# Patient Record
Sex: FEMALE | Race: ASIAN | NOT HISPANIC OR LATINO | URBAN - METROPOLITAN AREA
[De-identification: names, ages, dates, MRNs, and addresses within clinical notes are randomized per-mention and may not be internally consistent; named-entity substitution may affect disease eponyms.]

---

## 2017-04-17 ENCOUNTER — OFFICE VISIT (OUTPATIENT)
Dept: FAMILY MEDICINE | Facility: CLINIC | Age: 27
End: 2017-04-17

## 2017-04-17 VITALS
DIASTOLIC BLOOD PRESSURE: 64 MMHG | SYSTOLIC BLOOD PRESSURE: 97 MMHG | WEIGHT: 112.2 LBS | TEMPERATURE: 98.2 F | HEART RATE: 78 BPM

## 2017-04-17 DIAGNOSIS — Z30.09 ENCOUNTER FOR OTHER GENERAL COUNSELING OR ADVICE ON CONTRACEPTION: ICD-10-CM

## 2017-04-17 DIAGNOSIS — G40.909 NONINTRACTABLE EPILEPSY WITHOUT STATUS EPILEPTICUS, UNSPECIFIED EPILEPSY TYPE (H): Primary | ICD-10-CM

## 2017-04-17 LAB
ALBUMIN SERPL BCP-MCNC: 4.2 G/DL (ref 3.5–5)
ALP SERPL-CCNC: 69 U/L (ref 45–120)
ALT SERPL W/O P-5'-P-CCNC: 11 U/L (ref 0–45)
ANION GAP SERPL CALCULATED.3IONS-SCNC: 6 MMOL/L (ref 5–18)
AST SERPL-CCNC: 15 U/L (ref 0–40)
BILIRUB SERPL-MCNC: 0.8 MG/DL (ref 0–1)
BUN SERPL-MCNC: 12 MG/DL (ref 8–22)
CALCIUM SERPL-MCNC: 9.2 MG/DL (ref 8.5–10.5)
CHLORIDE SERPL-SCNC: 108 MMOL/L (ref 98–107)
CO2 SERPL-SCNC: 26 MMOL/L (ref 22–31)
CREAT SERPL-MCNC: 0.68 MG/DL (ref 0.6–1.1)
GLUCOSE SERPL-MCNC: 89 MG/DL (ref 70–125)
HCG UR QL: NEGATIVE
HCT VFR BLD AUTO: 34.8 % (ref 35–47)
HEMOGLOBIN: 10.6 G/DL (ref 11.7–15.7)
MAGNESIUM SERPL-MCNC: 2.3 MG/DL (ref 1.8–2.6)
MCH RBC QN AUTO: 19 PG (ref 26.5–35)
MCHC RBC AUTO-ENTMCNC: 30.5 G/DL (ref 32–36)
MCV RBC AUTO: 62.3 FL (ref 78–100)
PLATELET # BLD AUTO: 303 K/UL (ref 150–450)
POTASSIUM SERPL-SCNC: 4.1 MMOL/L (ref 3.5–5)
PROT SERPL-MCNC: 7.7 G/DL (ref 6–8)
RBC # BLD AUTO: 5.6 M/UL (ref 3.8–5.2)
SODIUM SERPL-SCNC: 140 MMOL/L (ref 136–145)
TSH SERPL DL<=0.05 MIU/L-ACNC: 1.8 UIU/ML (ref 0.3–5)
WBC # BLD AUTO: 9.3 K/UL (ref 4–11)

## 2017-04-17 NOTE — MR AVS SNAPSHOT
After Visit Summary   4/17/2017    Mac Austin    MRN: [id number]           Patient Information     Date Of Birth          1990        Visit Information        Provider Department      4/17/2017 2:30 PM Elvin Brizuela DO [facility]        Today's Diagnoses     Nonintractable epilepsy without status epilepticus, unspecified epilepsy type (H)    -  1    Encounter for other general counseling or advice on contraception           Follow-ups after your visit        Additional Services     NEUROLOGY ADULT REFERRAL       Patient to stop at the [facility] Desk    Reason for Referral: 5 years history of generalized siezures. Occurs 3-4x a month. Only occurs at night  [facility]: [facility] ([facility]): [phone]     needed: Yes  Language: Jennifer    May leave message on voicemail: No                  Future tests that were ordered for you today     Open Future Orders        Priority Expected Expires Ordered    MRI BRAIN W CONTRAST Routine  4/17/2018 4/17/2017    AMBULATORY EEG MONITORING Routine  5/15/2017 4/17/2017    NEUROLOGY ADULT REFERRAL Routine  9/17/2017 4/17/2017            Who to contact     Please call your clinic at [phone] to:    Ask questions about your health    Make or cancel appointments    Discuss your medicines    Learn about your test results    Speak to your doctor   If you have compliments or concerns about an experience at your clinic, or if you wish to file a complaint, please contact [facility] Patient Relations at [phone] or email us at [email or web address]         Additional Information About Your Visit        Art of Clickhart Information     [employer] is an electronic gateway that provides easy, online access to your medical records. With [employer], you can request a clinic appointment, read your test results, renew a prescription or communicate with your care team.     To sign up for [employer] visit the  website at [email or web address]   You will be asked to enter the access code listed below, as well as some personal information. Please follow the directions to create your username and password.     Your access code is: [email or web address]  Expires: 2017  3:32 PM     Your access code will  in 90 days. If you need help or a new code, please contact your [facility] or call [phone] for assistance.        Care EveryWhere ID     This is your Care EveryWhere ID. This could be used by other organizations to access your [place] medical records  [id number]        Your Vitals Were     Pulse Temperature Last Period Breastfeeding?          78 98.2  F (36.8  C) (Oral) 2017 (Approximate) No         Blood Pressure from Last 3 Encounters:   17 97/64    Weight from Last 3 Encounters:   17 112 lb 3.2 oz (50.9 kg)              We Performed the Following     CBC with Plt ([facility])     Comprehensive Metabolic ([facility]) - Results > 1 hr     HCG Qualitative Urine (UPT)  ([facility])     Magnesium ([facility])     TSH  Sensitive ([facility])        Primary Care Provider Office Phone # Fax #    Buck Olivo [phone]       [facility] [address] [place] [address]        Thank you!     Thank you for choosing [facility]  for your care. Our goal is always to provide you with excellent care. Hearing back from our patients is one way we can continue to improve our services. Please take a few minutes to complete the written survey that you may receive in the mail after your visit with us. Thank you!             Your Updated Medication List - Protect others around you: Learn how to safely use, store and throw away your medicines at www.disposemymeds.org.      Notice  As of 2017  8:39 PM    You have not been prescribed any medications.

## 2017-04-17 NOTE — PROGRESS NOTES
"  Family History   Problem Relation Age of Onset     DIABETES No family hx of      Coronary Artery Disease No family hx of      Other Cancer No family hx of      Social History     Social History     Marital status:      Spouse name: N/A     Number of children: N/A     Years of education: N/A     Social History Main Topics     Smoking status: Never Smoker     Smokeless tobacco: None     Alcohol use No     Drug use: No     Sexual activity: Not Asked     Other Topics Concern     None     Social History Narrative     None       Nursing Notes:   Chante Nobles  4/17/2017  2:45 PM  Signed   name: Anastacio Hemphill (Htoo)  Language: Jennifer  Agency: [employer]  Phone number: [phone]  Chief Complaint   Patient presents with     Seizures     at night time she is having seizures, it does not happen during the day just at night, she has muscle tightness, screams, and also she bites her tounge and lip, it happens 3-4 times a month been going on for 5.5 years, after the seizures she gets headache right afterwards      Blood pressure 97/64, pulse 78, temperature 98.2  F (36.8  C), temperature source Oral, weight 112 lb 3.2 oz (50.9 kg), last menstrual period 03/31/2017, not currently breastfeeding.    S:  Seizures Started about 5-6 years ago, occurs about 3-4x/month. No Inciting event, no trauma, no adverse event. Patient's  describes seizure activity as tight neck, muscles and tight arms and legs and sometimes foam in mouth. After event, pt reports that she does not remember event, has a headache, and \"fogginess\". In the past, was more prone to get seizure during menses but currently not know what triggers event.    Patient denies past history of trauma to head. Admits to psychological stress of running away during civil war. Does not use alcohol, smoke, or recreational drugs. Does not use any medications. Past medical history includes occasional UTI. No current UTI    Patient reports that she [place]  [place] on [place] for the seizure and was prescribed \"predno\". Reports relief of symptoms for 1 month while on medication. Stopped taking because the were worried that she might become pregnant as they were not using birth control at that time    At this time, they are not currently interested in getting pregnant and would like to manage the seizure disorder first.     FH:  No family history of seizures, no heart disease    SH: not contributory    O:  BP 97/64 (BP Location: Left arm, Patient Position: Chair)  Pulse 78  Temp 98.2  F (36.8  C) (Oral)  Wt 112 lb 3.2 oz (50.9 kg)  LMP 03/31/2017 (Approximate)  Breastfeeding? No  General: On Chair, no acute distress  HEENT: No conjunctivitis, EOM grossly intact, oral mucosa pink and moist, no cervical adenopathy palpated  Heart: RRR, no murmurs, rubs, or clicks  Lungs: CTA all fields, no wheeze, no crackles, no respiratory distress  Abd: Soft, non tender, not distended, no guarding, no rebound, normoactive bowel sounds,   Extremites: No edema, no lesions noted, pulses present bilaterally and equal, Skin: No lesions, no edema, excorations, or rashes noted  Neuro: no focal neuro deficit,sensation intact upper and lower extremites, strength 5/5 upper and lower extremites      A/P:  1. Nonintractable epilepsy without status epilepticus, unspecified epilepsy type (H)  Patient likely has generalized seizure disorder from symptoms. Noted no family history. Will get labs to see if electrolyte imbalance can be causing symptoms. MRI obtained to r/o organic cause of seizure. EEG and neuro referral for help in management. Discussed with patient to start on contraceptive as will likely begin anticonvulsant at next visit. Will get bHG to ensure that not pregnant at this time.   - CBC with Plt ([facility])  - Comprehensive Metabolic ([facility]) - Results > 1 hr  - Magnesium ([facility])  - TSH  Sensitive ([facility])  - HCG Qualitative Urine (UPT)  ([facility])  - NEUROLOGY " ADULT REFERRAL; Future  - AMBULATORY EEG MONITORING; Future  - MRI BRAIN W CONTRAST; Future  - F/u in 2-3 weeks after MRI and EEG and start on loading dose Keppra at that time  - Call patient to advise not to drive at this time    2. Encounter for other general counseling or advice on contraception  Discussed options for contraception as will likely need birth control while on anticonvlsent. Options include OCP, depot, patch, IUD, and nexplanon. Patient reports that she will think about it at this time. Does not desire to get pregnant next 1 year but wants kids eventually  - HCG Qualitative Urine (UPT)  ([facility])  - f/u at next visit when starting anticonvulsant  - gave condoms to use.    Elvin Brizuela  Family Medicine Resident PGY2    Discussed with Dr. Lawson

## 2017-04-17 NOTE — NURSING NOTE
name: Anastacio (Mary Jo) Joby  Language: Jennifer  Agency: [employer]  Phone number: [phone]

## 2017-04-18 NOTE — PATIENT INSTRUCTIONS
Your referral has been scheduled:     [facility]   [address] [place] [address]  [phone]  MRI   Date: Monday April 24 2017   Time: 9:15 AM  Rescheduled to Friday April 28, 2017  Time:  5:15 PM  Jennifer  requested.  Annette Velez  4/24/17    [facility]  [address], [address],   [place] [address] ([facility], [facility] [address])  [id number]  Date: Wednesday May 24 2017   Time: 9:30 AM Dr. Herman and EEG     If you have any questions or need to reschedule please call the number listed above.  Any other concerns please call our referral coordinator at [phone]    Le  Care Coordinator     GAVE TO JOE

## 2017-05-01 NOTE — PROGRESS NOTES
Preceptor attestation:  Patient seen and discussed with the resident. Assessment and plan reviewed with resident and agreed upon.  Supervising physician: Paco Lawson  [facility]

## 2017-05-02 DIAGNOSIS — G40.909 NONINTRACTABLE EPILEPSY WITHOUT STATUS EPILEPTICUS, UNSPECIFIED EPILEPSY TYPE (H): ICD-10-CM

## 2017-05-10 ENCOUNTER — OFFICE VISIT (OUTPATIENT)
Dept: FAMILY MEDICINE | Facility: CLINIC | Age: 27
End: 2017-05-10

## 2017-05-10 VITALS
OXYGEN SATURATION: 98 % | SYSTOLIC BLOOD PRESSURE: 105 MMHG | TEMPERATURE: 98.4 F | BODY MASS INDEX: 20.69 KG/M2 | HEART RATE: 83 BPM | HEIGHT: 61 IN | WEIGHT: 109.6 LBS | DIASTOLIC BLOOD PRESSURE: 72 MMHG

## 2017-05-10 DIAGNOSIS — G44.219 EPISODIC TENSION-TYPE HEADACHE, NOT INTRACTABLE: ICD-10-CM

## 2017-05-10 DIAGNOSIS — D64.9 ANEMIA, UNSPECIFIED TYPE: ICD-10-CM

## 2017-05-10 DIAGNOSIS — R56.9 CONVULSIONS, UNSPECIFIED CONVULSION TYPE (H): ICD-10-CM

## 2017-05-10 DIAGNOSIS — R10.11 ABDOMINAL PAIN, RIGHT UPPER QUADRANT: Primary | ICD-10-CM

## 2017-05-10 LAB
ALBUMIN SERPL-MCNC: 4.5 MG/DL (ref 3.9–5.1)
ALP SERPL-CCNC: 66.5 U/L (ref 40–150)
ALT SERPL-CCNC: <15 U/L (ref 0–45)
AST SERPL-CCNC: 17.3 U/L (ref 0–45)
BILIRUB SERPL-MCNC: 0.8 MG/DL (ref 0.2–1.3)
BILIRUBIN DIRECT: 0.3 MG/DL (ref 0–0.2)
BUN SERPL-MCNC: 8.8 MG/DL (ref 7–19)
CALCIUM SERPL-MCNC: 9.7 MG/DL (ref 8.5–10.1)
CHLORIDE SERPLBLD-SCNC: 99.6 MMOL/L (ref 98–110)
CO2 SERPL-SCNC: 26.2 MMOL/L (ref 20–32)
CREAT SERPL-MCNC: 0.6 MG/DL (ref 0.5–1)
FERRITIN SERPL-MCNC: 96 NG/ML (ref 10–130)
GFR SERPL CREATININE-BSD FRML MDRD: >90 ML/MIN/1.7 M2
GLUCOSE SERPL-MCNC: 97.7 MG'DL (ref 70–99)
IRON SATN MFR SERPL: 13 % (ref 20–50)
IRON SERPL-MCNC: 31 UG/DL (ref 42–175)
LIPASE SERPL-CCNC: 7 U/L (ref 0–52)
POTASSIUM SERPL-SCNC: 3.7 MMOL/DL (ref 3.2–4.6)
PROT SERPL-MCNC: 8 G/DL (ref 6.8–8.8)
SODIUM SERPL-SCNC: 137 MMOL/L (ref 132–142)
TIBC SERPL-MCNC: 241 UG/DL (ref 313–563)
TRANSFERRIN SERPL-MCNC: 193 MG/DL (ref 212–360)

## 2017-05-10 RX ORDER — IBUPROFEN 600 MG/1
600 TABLET, FILM COATED ORAL EVERY 6 HOURS PRN
Qty: 90 TABLET | Refills: 1 | Status: SHIPPED | OUTPATIENT
Start: 2017-05-10 | End: 2017-10-03

## 2017-05-10 NOTE — PROGRESS NOTES
Preceptor attestation:  Patient seen and discussed with the resident. Assessment and plan reviewed with resident and agreed upon.  Supervising physician: Dieudonne Allan  [facility]

## 2017-05-10 NOTE — MR AVS SNAPSHOT
After Visit Summary   5/10/2017    Mac Austin    MRN: [id number]           Patient Information     Date Of Birth          1990        Visit Information        Provider Department      5/10/2017 2:10 PM Ariela Holman MD [facility]        Today's Diagnoses     Abdominal pain, right upper quadrant    -  1    Convulsions, unspecified convulsion type (H)        Episodic tension-type headache, not intractable        Anemia, unspecified type           Follow-ups after your visit        Future tests that were ordered for you today     Open Future Orders        Priority Expected Expires Ordered    US ABDOMEN LIMITED Routine  5/10/2018 5/10/2017            Who to contact     Please call your clinic at [phone] to:    Ask questions about your health    Make or cancel appointments    Discuss your medicines    Learn about your test results    Speak to your doctor   If you have compliments or concerns about an experience at your clinic, or if you wish to file a complaint, please contact [facility] Patient Relations at [phone] or email us at [email or web address]         Additional Information About Your Visit        MyChart Information     [employer] is an electronic gateway that provides easy, online access to your medical records. With [employer], you can request a clinic appointment, read your test results, renew a prescription or communicate with your care team.     To sign up for [employer] visit the website at [email or web address]   You will be asked to enter the access code listed below, as well as some personal information. Please follow the directions to create your username and password.     Your access code is: [email or web address]  Expires: 2017  3:32 PM     Your access code will  in 90 days. If you need help or a new code, please contact your [facility] or call [phone] for assistance.        Care EveryWhere ID      "This is your Care EveryWhere ID. This could be used by other organizations to access your [place] medical records  [id number]        Your Vitals Were     Pulse Temperature Height Last Period Pulse Oximetry BMI (Body Mass Index)    83 98.4  F (36.9  C) (Oral) 5' 1\" (154.9 cm) 04/12/2017 98% 20.71 kg/m2       Blood Pressure from Last 3 Encounters:   05/10/17 105/72   04/17/17 97/64    Weight from Last 3 Encounters:   05/10/17 109 lb 9.6 oz (49.7 kg)   04/17/17 112 lb 3.2 oz (50.9 kg)              We Performed the Following     Bilirubin  Panel ([facility])     Ferritin ([facility])     Hepatitis Acute Eval ([facility])     Hepatitis B Core Ab ([facility])     Hepatitis B Surface Ab ([facility])     Hepatitis B Surface Ag ([facility])     Hepatitis C Antibody ([facility])     Iron ([facility])     Iron Transferrin Saturat ([facility])     Lipase ([facility])     Liver Panel ([facility]) - Results < 1hr     Syphilis Screen [place] (RPR/VDRL) ([facility])     Transferrin ([facility])          Today's Medication Changes          These changes are accurate as of: 5/10/17  2:38 PM.  If you have any questions, ask your nurse or doctor.               Start taking these medicines.        Dose/Directions    ibuprofen 600 MG tablet   Commonly known as:  ADVIL/MOTRIN   Used for:  Episodic tension-type headache, not intractable   Started by:  Ariela Holman MD        Dose:  600 mg   Take 1 tablet (600 mg) by mouth every 6 hours as needed for moderate pain   Quantity:  90 tablet   Refills:  1            Where to get your medicines      [facility] [place] [place] - [address], [place] [address]     Phone:  [phone]     ibuprofen 600 MG tablet                Primary Care Provider Office Phone # Fax #    Elvni DO Gelacio [phone]       [address] [address]        Thank you!     Thank you for choosing [facility]  for your care. Our goal is always to provide you with excellent care. Hearing back from our patients is one way we can continue to improve our services. Please take a few minutes to complete the written survey that you may receive in the mail after your visit with us. Thank you!             Your Updated Medication List - Protect others around you: Learn how to safely use, store and throw away your medicines at www.disposemymeds.org.          This list is accurate as of: 5/10/17  2:38 PM.  Always use your most recent med list.                   Brand Name Dispense Instructions for use    ibuprofen 600 MG tablet    ADVIL/MOTRIN    90 tablet    Take 1 tablet (600 mg) by mouth every 6 hours as needed for moderate pain

## 2017-05-10 NOTE — PROGRESS NOTES
"       SUBJECTIVE       Mac Austin is a 27 year old  female with a PMH significant for:   There is no problem list on file for this patient.    She presents with concerns of right side abdominal pain.  She notes that the pain started one week ago after having her MRI.  It is a pressure-like pain that she notes that movement in the car causes pain and it hurts at random times during the day. It is not associated with eating.  She has no problems with her liver that she is aware of.     She had labs at her last visit for concern of seizure and LFTs were within normal limits at that time. She notes that after having the MRI the pain got worse.  It occurs intermittently but sometime will last.  Has not tried anything for the pain.  Does not know whether she has had problems medically in the past - thinks she's healthy but doesn't recall prior screening. No nausea, vomiting.  Some headache but associated with seizures.      Of note, she has an appointment in two weeks with Dr. Herman for EEG for concerns of seizures that were noted at her last visit. Seizures last about 1-2 minutes. Happen at night time only.  She has medicine from [place] at home. She has three episodes of the seizure this week on Monday or Tuesday.      She came to this country 5.5 years ago and was living in [place], [place] initially. She has been in [place] for 4.5 years.  She denies being seen in any other clinic here in [place].  She doesn't know the name of any clinics.  She does not know if there are any other medical problems.      PMH, Medications and Allergies were reviewed and updated as needed.        REVIEW OF SYSTEMS     Pertinent review, per HPI.        OBJECTIVE     Vitals:    05/10/17 1410   BP: 105/72   Pulse: 83   Temp: 98.4  F (36.9  C)   TempSrc: Oral   SpO2: 98%   Weight: 109 lb 9.6 oz (49.7 kg)   Height: 5' 1\" (154.9 cm)     Body mass index is 20.71 kg/(m^2).    Constitutional: Well appearing, no acute distress.  Abdomen:  " Tender in RUQ.  Some tenderness over liver.  Patient with normal bowel sounds. No rebound or guarding.   Skin: NO jaundice.   Cardio: Regular rate and rhythm, no murmurs  Respiratory: No respiratory distress, lungs clear to posterior auscultation bilaterally.     No results found for this or any previous visit (from the past 24 hour(s)).        ASSESSMENT AND PLAN     Seizure concerns: Follow up two weeks with neurolgoy on 5/24 with Dr. Herman. Reminded of this appointment today. Will obtain syphilis today to add on as a possible cause, albeit unlikely. This is unclear whether it is a sleep disorder, as it occurs only at nighttime or true seizures.  Will have neuro eval and instructed to not drive.  Unable to obtain records from initial refugee screening appt.   - Neuro appt.      Headache: reported post ictal headache: MRI brain is normal last month.  Will prescribe ibuprofen to help with this.      Abdominal pain: RUQ - not associated with eating, unlikely to be billiary.  Started after MRI - will check kidneys for contrast.  Abdominal exam revealed tenderness in RUQ.  No fevers or acute abdomen suggesting a cholecystitis or acute surgical pathology.  Will obtain hepatitis labs (refugee without known hep status) and LFTs as well as an US to assess the liver.  Also will check lipase given location and acute onset of pain.    - Lab workup, US workup.   - Continue to monitor - follow up for worse pain, fever  - Ibuprofen for pain     Anemia: High risk for iron deficiency and thalassemias.  No prior records.  Will assess iron studies, if normal, will obtain hemoglobin electrophoresis    RTC 1 wk or sooner if develops new or worsening symptoms.    Ariela Holman MD  PGY-3 [facility]

## 2017-05-10 NOTE — LETTER
May 15, 2017      Norman Watts  [address]  [place] [address]        Dear Norman,    Please see below for your test results.  Your lab results have returned and there is a concern that you have hepatitis B, but will need further testing to confirm this.  Your iron levels are low and I would like you to start an iron supplement.  We need to discuss these concerns in clinic, so I recommend that you follow up with a provider at [facility] to talk about this. Additionally, it will be important to have that ultrasound test of your liver - so please call clinic to schedule this if you have not done this already.  Thank you!    Resulted Orders   Hepatitis B Surface Ab ([facility])   Result Value Ref Range    Hepatitis B Surface Antibody Negative Negative    Narrative    Test performed by:  [facility] [address] [place] [address]   Hepatitis Acute Eval ([facility])   Result Value Ref Range    Hepatitis A Antibody, IgM Negative Negative    Hepatitis B Core [place], IGM Negative Negative    Hepatitis B Surface Antigen Positive, Confirm (A) Negative    Hepatitis C Antibody Screen Negative Negative    Narrative    Test performed by:  [facility] [address] [place] [address]   Syphilis Screen [place] (RPR/VDRL) [facility])   Result Value Ref Range    Syphilis Screen [place] Non-Reactive Non-Reactive    Narrative    Test performed by:  [facility] [address] [place] [address]   Iron Transferrin Saturat ([facility])   Result Value Ref Range    Iron 31 (L) 42 - 175 ug/dL    Transferrin 193 (L) 212 - 360 mg/dL    Transferrin Saturation 13 (L) 20 - 50 %    Transferrin  (L) 313 - 563 ug/dL    Narrative    Test performed by:  [facility] [address] [place] [address]   Ferritin ([facility])   Result Value Ref Range    Ferritin 96 10 - 130 ng/mL    Narrative    Test performed by:  [facility] [address] [place] [address]    Lipase ([facility])   Result Value Ref Range    Lipase 7 0 - 52 U/L    Narrative    Test performed by:  [facility]  [address] [place] [address]   Basic Metabolic Panel ([facility])  - Results < 1 hr   Result Value Ref Range    Urea Nitrogen 8.8 7.0 - 19.0 mg/dL    Calcium 9.7 8.5 - 10.1 mg/dL    Chloride 99.6 98.0 - 110.0 mmol/L    Carbon Dioxide 26.2 20.0 - 32.0 mmol/L    Creatinine 0.6 0.5 - 1.0 mg/dL    Glucose 97.7 70.0 - 99.0 mg'dL    Potassium 3.7 3.2 - 4.6 mmol/dL    Sodium 137.0 132.0 - 142.0 mmol/L    GFR Estimate >90 >60.0 mL/min/1.7 m2    GFR Estimate If Black >90 >60.0 mL/min/1.7 m2   Hepatic Panel ([facility])   Result Value Ref Range    Albumin 4.5 3.9 - 5.1 mg/dL    Alkaline Phosphatase 66.5 40.0 - 150.0 U/L    ALT <15 0.0 - 45.0 U/L    AST 17.3 0.0 - 45.0 U/L    Bilirubin Direct 0.3 (H) 0.0 - 0.2 mg/dL    Bilirubin Total 0.8 0.2 - 1.3 mg/dL    Protein Total 8.0 6.8 - 8.8 g/dL       If you have any questions, please call the clinic to make an appointment.    Sincerely,    Ariela Holman MD

## 2017-05-10 NOTE — NURSING NOTE
name: Anastacio (Mary Jo) Joby  Language: Jennifer  Agency: [employer]  Phone number: [phone]

## 2017-05-11 LAB
HAV IGM SER QL: NEGATIVE
HBV SURFACE AB SER-ACNC: NEGATIVE M[IU]/ML
HBV SURFACE AG SERPL QL IA: ABNORMAL
HCV AB SER QL: NEGATIVE
HEPATITIS B CORE ABY, IGM: NEGATIVE
RPR SER QL: NORMAL

## 2017-05-15 NOTE — PATIENT INSTRUCTIONS
Your ultrasound referral information:  [facility]  [address] [place] [address]  [phone]    DATE: Monday, May 22nd  TIME: 1:45pm arrival for a 2pm scan.    An  will be requested.  You can't have anything to eat or drink, including gum and smoking, for 8 hours before your appointment.  The scan will take 45 minutes.  Information given to .  Orders faxed to [phone].    If you have any questions or need to help rescheduling this appointment please call us at [phone].    Alejandra

## 2017-05-19 ENCOUNTER — OFFICE VISIT (OUTPATIENT)
Dept: FAMILY MEDICINE | Facility: CLINIC | Age: 27
End: 2017-05-19

## 2017-05-19 VITALS
BODY MASS INDEX: 20.81 KG/M2 | TEMPERATURE: 97.4 F | WEIGHT: 110.2 LBS | SYSTOLIC BLOOD PRESSURE: 103 MMHG | DIASTOLIC BLOOD PRESSURE: 67 MMHG | HEIGHT: 61 IN | HEART RATE: 76 BPM

## 2017-05-19 DIAGNOSIS — R10.12 LUQ ABDOMINAL PAIN: ICD-10-CM

## 2017-05-19 DIAGNOSIS — D50.9 IRON DEFICIENCY ANEMIA, UNSPECIFIED IRON DEFICIENCY ANEMIA TYPE: Primary | ICD-10-CM

## 2017-05-19 DIAGNOSIS — K59.00 CONSTIPATION, UNSPECIFIED CONSTIPATION TYPE: ICD-10-CM

## 2017-05-19 DIAGNOSIS — R56.9 CONVULSIONS, UNSPECIFIED CONVULSION TYPE (H): ICD-10-CM

## 2017-05-19 RX ORDER — FERROUS SULFATE 325(65) MG
325 TABLET ORAL 2 TIMES DAILY
Qty: 60 TABLET | Refills: 2 | Status: SHIPPED | OUTPATIENT
Start: 2017-05-19 | End: 2017-09-13

## 2017-05-19 RX ORDER — POLYETHYLENE GLYCOL 3350 17 G/17G
1 POWDER, FOR SOLUTION ORAL DAILY
Qty: 510 G | Refills: 1 | Status: SHIPPED | OUTPATIENT
Start: 2017-05-19 | End: 2017-10-03

## 2017-05-19 NOTE — PROGRESS NOTES
Preceptor attestation:  Patient seen and discussed with the resident. Assessment and plan reviewed with resident and agreed upon.  Supervising physician: Forest Lozano

## 2017-05-19 NOTE — PROGRESS NOTES
"  Family History   Problem Relation Age of Onset     DIABETES No family hx of      Coronary Artery Disease No family hx of      Other Cancer No family hx of      Social History     Social History     Marital status:      Spouse name: N/A     Number of children: N/A     Years of education: N/A     Social History Main Topics     Smoking status: Never Smoker     Smokeless tobacco: None     Alcohol use No     Drug use: No     Sexual activity: Not Asked     Other Topics Concern     None     Social History Narrative       There are no exam notes on file for this visit.  Chief Complaint   Patient presents with     RECHECK     patient here to follow up on seizures, not getting any better      Blood pressure 103/67, pulse 76, temperature 97.4  F (36.3  C), temperature source Oral, height 5' 0.5\" (153.7 cm), weight 110 lb 3.2 oz (50 kg), last menstrual period 05/05/2017, not currently breastfeeding.    S:    Patient continues to have seizures about now abou 1 once every 2 weeks. Last week occurred about 2-3 times. Patient reports being forgetful after the episodes and feeling tired. Patient was discussed with Neurologist at the hospital and suggested that patient wait until formal EEG before starting anticonvulsives.     Regarding her right upper quadrant pain, patient reports that her pain has since resolved.  It is noted that patient was hepatitis B antigen positive although hepatitis B surface antibody negative.  patient also complains of constipation and reports having stool only 2-3 times a week.  Currently, patient denies nausea, vomiting, abdominal pain, diarrhea.  No blood in urine, no blood in stools.    Patient had a letter last time stating that her hepatitis B surface antigen was positive although the surface antibody was negative.  Patient reports that she has had blood transfusion about 10 years ago.  She denies any fevers, chills, weight loss.  Other than the abdominal pain, patient has had no other " "symptoms.  O:  /67  Pulse 76  Temp 97.4  F (36.3  C) (Oral)  Ht 5' 0.5\" (153.7 cm)  Wt 110 lb 3.2 oz (50 kg)  LMP 05/05/2017 (Approximate)  BMI 21.17 kg/m2  General: Patient on the chair, no acute distress  HEENT: Extraocular motion intact, no enteritis, no cervical adenopathy palpated  Heart: Regular rate and rhythm, no murmurs rubs or clicks  Lungs: Clear to auscultation bilaterally  Extremities: Strength 5 out of 5 bilateral upper and lower extremities.  Sensation intact upper extremities.  Pulses present bilaterally symmetrical.     1. Iron deficiency anemia, unspecified iron deficiency anemia type  Patient noted to be iron deficient with a low iron saturation, will transfer him.  Patient was started on ferrous iron twice daily.  Patient has noted constipation goes 2-3 times a week.  Will be also started on MiraLAX.  Patient denies any recent blood loss.  Should recheck hemoglobin in 6 months and assess for anemia at that time.  - ferrous sulfate (IRON) 325 (65 FE) MG tablet; Take 1 tablet (325 mg) by mouth 2 times daily  Dispense: 60 tablet; Refill: 2    2. Convulsions, unspecified convulsion type (H)  Patient continues to have convulsions at night.  Concern for seizure versus primary nonepileptic seizure events.  Concerning as it only occurs at nighttime.  As discussed with neurology, patient well be evaluated with EEG  prior to starting on medication.  Discussed with patient to either call us or go to emergency room if convulsions continue past 5 minutes.  - Vitamin D 25-Hydroxy ([facility])    3. LUQ abdominal pain  Left upper quadrant abdominal pain has since resolved.  Hepatitis panel concerning for possible hepatitis B infection.  Will get a panel today to evaluate.  As on the hepatitis B surface antigen positive, can be likely false positive.  Patient also has a follow-up appointment for abdominal ultrasound.  No worrisomesymptoms symptoms at today's examination.  - Hepatitis B Core Ab " ([facility])  - Hepatitis B Surface Ab ([facility])  - Hepatitis Be Agn ([facility])  - Hepatitis B Surface Ag ([facility])    4. Constipation, unspecified constipation type  - polyethylene glycol (MIRALAX) powder; Take 17 g (1 capful) by mouth daily  Dispense: 510 g; Refill: 1    Elvin Brizuela  Family Medicine Resident PGY2    Patient Instructions   - Go to your appointment next week  - Go down stairs for lab draw  - start on iron 2x a day daily   - If seizures does not stop, go to the hospital

## 2017-05-19 NOTE — MR AVS SNAPSHOT
After Visit Summary   5/19/2017    Norman Watts    MRN: [id number]           Patient Information     Date Of Birth          1990        Visit Information        Provider Department      5/19/2017 3:10 PM Elvin Brizuela DO [facility]        Today's Diagnoses     Iron deficiency anemia, unspecified iron deficiency anemia type    -  1    Convulsions, unspecified convulsion type (H)        LUQ abdominal pain          Care Instructions    - Go to your appointment next week  - Go down stairs for lab draw  - start on iron 2x a day daily   - If seizures does not stop, go to the hospital        Follow-ups after your visit        Your next 10 appointments already scheduled     May 31, 2017  3:10 PM CDT   Return Visit with DO Debbie [facility]    [address] [place] [address]   [phone]              Who to contact     Please call your clinic at [phone] to:    Ask questions about your health    Make or cancel appointments    Discuss your medicines    Learn about your test results    Speak to your doctor   If you have compliments or concerns about an experience at your clinic, or if you wish to file a complaint, please contact [facility] Patient Relations at [phone] or email us at [email or web address]         Additional Information About Your Visit        MyChart Information     Kngroo is an electronic gateway that provides easy, online access to your medical records. With Kngroo, you can request a clinic appointment, read your test results, renew a prescription or communicate with your care team.     To sign up for [employer] visit the website at [email or web address]   You will be asked to enter the access code listed below, as well as some personal information. Please follow the directions to create your username and password.     Your access code is: [email or web address]  Expires: 7/16/2017  3:32 PM     Your access  "code will  in 90 days. If you need help or a new code, please contact your [facility] or call [phone] for assistance.        Care EveryWhere ID     This is your Care EveryWhere ID. This could be used by other organizations to access your [place] medical records  [id number]        Your Vitals Were     Pulse Temperature Height Last Period BMI (Body Mass Index)       76 97.4  F (36.3  C) (Oral) 5' 0.5\" (153.7 cm) 2017 (Approximate) 21.17 kg/m2        Blood Pressure from Last 3 Encounters:   17 103/67   05/10/17 105/72   17 97/64    Weight from Last 3 Encounters:   17 110 lb 3.2 oz (50 kg)   05/10/17 109 lb 9.6 oz (49.7 kg)   17 112 lb 3.2 oz (50.9 kg)              We Performed the Following     Hepatitis B Core Ab ([facility])     Hepatitis B Surface Ab ([facility]     Hepatitis B Surface Ag ([facility])     Hepatitis Be Agn ([facility])     Vitamin D 25-Hydroxy ([facility]          Today's Medication Changes          These changes are accurate as of: 17  3:41 PM.  If you have any questions, ask your nurse or doctor.               Start taking these medicines.        Dose/Directions    ferrous sulfate 325 (65 FE) MG tablet   Commonly known as:  IRON   Used for:  Iron deficiency anemia, unspecified iron deficiency anemia type   Started by:  Elvin Brizuela DO        Dose:  325 mg   Take 1 tablet (325 mg) by mouth 2 times daily   Quantity:  60 tablet   Refills:  2            Where to get your medicines      These medications were sent to [facility] - [place], [place] - [address], [place] [address]     Phone:  [phone]     ferrous sulfate 325 (65 FE) MG tablet                Primary Care Provider Office Phone # Fax #    Elvin Brizuela [phone]       [address] [address]        Thank you!     Thank you for choosing [facility]  for your care. " Our goal is always to provide you with excellent care. Hearing back from our patients is one way we can continue to improve our services. Please take a few minutes to complete the written survey that you may receive in the mail after your visit with us. Thank you!             Your Updated Medication List - Protect others around you: Learn how to safely use, store and throw away your medicines at www.disposemymeds.org.          This list is accurate as of: 5/19/17  3:41 PM.  Always use your most recent med list.                   Brand Name Dispense Instructions for use    ferrous sulfate 325 (65 FE) MG tablet    IRON    60 tablet    Take 1 tablet (325 mg) by mouth 2 times daily       ibuprofen 600 MG tablet    ADVIL/MOTRIN    90 tablet    Take 1 tablet (600 mg) by mouth every 6 hours as needed for moderate pain

## 2017-05-19 NOTE — PATIENT INSTRUCTIONS
- Go to your appointment next week  - Go down stairs for lab draw  - start on iron 2x a day daily   - If seizures does not stop, go to the hospital

## 2017-05-22 LAB
25(OH)D3 SERPL-MCNC: 16.5 NG/ML (ref 30–80)
HBV CORE AB SERPL QL IA: POSITIVE
HBV SURFACE AB SER-ACNC: NEGATIVE M[IU]/ML
HEPATITIS BE AGN: NEGATIVE

## 2017-05-23 DIAGNOSIS — R10.11 ABDOMINAL PAIN, RIGHT UPPER QUADRANT: ICD-10-CM

## 2017-05-23 LAB — HBV SURFACE AG SERPL QL IA: ABNORMAL

## 2017-05-30 NOTE — PROGRESS NOTES
Patient has follow up appointment with me on 5/30/17. Will discuss Hep B and Vit D deficiency at that time    Elvin Brizuela  Family Medicine Resident PGY2

## 2017-05-31 ENCOUNTER — OFFICE VISIT (OUTPATIENT)
Dept: FAMILY MEDICINE | Facility: CLINIC | Age: 27
End: 2017-05-31

## 2017-05-31 VITALS
DIASTOLIC BLOOD PRESSURE: 71 MMHG | HEART RATE: 118 BPM | WEIGHT: 110.8 LBS | TEMPERATURE: 97.4 F | BODY MASS INDEX: 21.28 KG/M2 | SYSTOLIC BLOOD PRESSURE: 111 MMHG

## 2017-05-31 DIAGNOSIS — B18.1 CHRONIC VIRAL HEPATITIS B WITHOUT DELTA AGENT AND WITHOUT COMA (H): Primary | ICD-10-CM

## 2017-05-31 DIAGNOSIS — R79.89 LOW VITAMIN D LEVEL: ICD-10-CM

## 2017-05-31 DIAGNOSIS — R56.9 CONVULSIONS, UNSPECIFIED CONVULSION TYPE (H): ICD-10-CM

## 2017-05-31 DIAGNOSIS — F41.9 ANXIETY DISORDER, UNSPECIFIED TYPE: ICD-10-CM

## 2017-05-31 LAB
ERYTHROCYTE [DISTWIDTH] IN BLOOD BY AUTOMATED COUNT: 16.8 % (ref 11–14.5)
HCT VFR BLD AUTO: 36.1 % (ref 35–47)
HGB BLD-MCNC: 11.4 G/DL (ref 12–16)
INR PPP: 1.06 (ref 0.9–1.1)
MCH RBC QN AUTO: 19.1 PG (ref 27–34)
MCHC RBC AUTO-ENTMCNC: 31.6 G/DL (ref 32–36)
MCV RBC AUTO: 61 FL (ref 80–100)
PLATELET # BLD AUTO: 257 THOU/UL (ref 140–440)
RBC # BLD AUTO: 5.97 MILL/UL (ref 3.8–5.4)
WBC # BLD AUTO: 7.2 THOU/UL (ref 4–11)

## 2017-05-31 NOTE — MR AVS SNAPSHOT
After Visit Summary   5/31/2017    Norman Watts    MRN: [id number]           Patient Information     Date Of Birth          1990        Visit Information        Provider Department      5/31/2017 3:10 PM Elvin Brizuela DO [facility]        Today's Diagnoses     Chronic viral hepatitis B without delta agent and without coma (H)    -  1    Low vitamin D level        Anxiety disorder, unspecified type           Follow-ups after your visit        Additional Services     GASTROENTEROLOGY ADULT REF CONSULT ONLY       Preferred Location: [facility] [phone]  - Patient has chronic hepatitis B. HBSag +, anti HBS -, HBeAg-, IgGanti HBC+, IgMantiHBc-       Please be aware that coverage of these services is subject to the terms and limitations of your health insurance plan.  Call member services at your health plan with any benefit or coverage questions.  Any procedures must be performed at a [facility] facility OR coordinated by your clinic's referral office.    Please bring the following with you to your appointment:    (1) Any X-Rays, CTs or MRIs which have been performed.  Contact the facility where they were done to arrange for  prior to your scheduled appointment.    (2) List of current medications   (3) This referral request   (4) Any documents/labs given to you for this referral                  Who to contact     Please call your clinic at [phone] to:    Ask questions about your health    Make or cancel appointments    Discuss your medicines    Learn about your test results    Speak to your doctor   If you have compliments or concerns about an experience at your clinic, or if you wish to file a complaint, please contact [facility] Patient Relations at [phone] or email us at [email or web address]         Additional Information About Your Visit        Raspberry Pi Foundationhart Information     Euphoria App is an electronic gateway that provides easy, online access to your  medical records. With Adaptive Computing, you can request a clinic appointment, read your test results, renew a prescription or communicate with your care team.     To sign up for [employer] Computing visit the website at [email or web address]   You will be asked to enter the access code listed below, as well as some personal information. Please follow the directions to create your username and password.     Your access code is: [email or web address]  Expires: 2017  3:32 PM     Your access code will  in 90 days. If you need help or a new code, please contact your [facility] or call [phone] for assistance.        Care EveryWhere ID     This is your Care EveryWhere ID. This could be used by other organizations to access your [place] medical records  [id number]        Your Vitals Were     Pulse Temperature Last Period BMI (Body Mass Index)          118 97.4  F (36.3  C) (Oral) 2017 (Approximate) 21.28 kg/m2         Blood Pressure from Last 3 Encounters:   17 111/71   17 103/67   05/10/17 105/72    Weight from Last 3 Encounters:   17 110 lb 12.8 oz (50.3 kg)   17 110 lb 3.2 oz (50 kg)   05/10/17 109 lb 9.6 oz (49.7 kg)              We Performed the Following     CBC w/ Plt. ([facility])     GASTROENTEROLOGY ADULT REF CONSULT ONLY     Hep B Virus DNA Quant Real Time PCR     INR ([facility])          Today's Medication Changes          These changes are accurate as of: 17  3:54 PM.  If you have any questions, ask your nurse or doctor.               Start taking these medicines.        Dose/Directions    cholecalciferol 65295 UNITS capsule   Commonly known as:  VITAMIN D3   Used for:  Low vitamin D level   Started by:  Elvin Brizuela DO        Dose:  1 capsule   Take 1 capsule (50,000 Units) by mouth once a week   Quantity:  8 capsule   Refills:  0       sertraline 50 MG tablet   Commonly known as:  ZOLOFT   Used for:  Anxiety disorder, unspecified type   Started  by:  Elvin Brizuela DO        Take 1/2 tablet (25 mg) for 1-2 weeks, then increase to 1 tablet orally daily   Quantity:  30 tablet   Refills:  0            Where to get your medicines      [facility] [place] [place] - [address], [place] [address]     Phone:  [phone]     cholecalciferol 74211 UNITS capsule    sertraline 50 MG tablet                Primary Care Provider Office Phone # Fax #    Elvin DO Gelacio [phone]       [facility] [address] [facility] [address]        Thank you!     Thank you for choosing [facility]  for your care. Our goal is always to provide you with excellent care. Hearing back from our patients is one way we can continue to improve our services. Please take a few minutes to complete the written survey that you may receive in the mail after your visit with us. Thank you!             Your Updated Medication List - Protect others around you: Learn how to safely use, store and throw away your medicines at www.disposemymeds.org.          This list is accurate as of: 5/31/17  3:54 PM.  Always use your most recent med list.                   Brand Name Dispense Instructions for use    cholecalciferol 49162 UNITS capsule    VITAMIN D3    8 capsule    Take 1 capsule (50,000 Units) by mouth once a week       ferrous sulfate 325 (65 FE) MG tablet    IRON    60 tablet    Take 1 tablet (325 mg) by mouth 2 times daily       ibuprofen 600 MG tablet    ADVIL/MOTRIN    90 tablet    Take 1 tablet (600 mg) by mouth every 6 hours as needed for moderate pain       polyethylene glycol powder    MIRALAX    510 g    Take 17 g (1 capful) by mouth daily       sertraline 50 MG tablet    ZOLOFT    30 tablet    Take 1/2 tablet (25 mg) for 1-2 weeks, then increase to 1 tablet orally daily

## 2017-05-31 NOTE — NURSING NOTE
name: Anastacio (Mary Jo) Joby  Language: Jennifer  Agency: [employer]  Phone number: [phone]

## 2017-05-31 NOTE — PROGRESS NOTES
"  Family History   Problem Relation Age of Onset     DIABETES No family hx of      Coronary Artery Disease No family hx of      Other Cancer No family hx of      Social History     Social History     Marital status:      Spouse name: N/A     Number of children: N/A     Years of education: N/A     Social History Main Topics     Smoking status: Never Smoker     Smokeless tobacco: Not on file     Alcohol use No     Drug use: No     Sexual activity: Not on file     Other Topics Concern     Not on file     Social History Narrative       There are no exam notes on file for this visit.  Chief Complaint   Patient presents with     Follow Up For     Pt is here to follow up on seizures.      Last menstrual period 05/05/2017, not currently breastfeeding.      S:  Patient saw neurology and EEG and stated that she had \"good brain\".  Was started on 500mg Keppra and takes it daily. Does not remember the name of medications. No seizures since last seen. Will be Following up with Dr. Herman July 21 (9:00am)    Also discussed positive hepitatis B  Admits to sometimes having \"full stomach\", and afraid of being alone, but no fevers, chills, nausea, vomiting, diarrhea constiation    Patient admits to feeling anxious. She reports that she sits at home and feels anxious about seizures. Wants to sleep but worried that she will get a seizure and become unconsciouss and no one will see her. She admits to also sleep walking and afraid of sleeping.   Patient reports no chance of being pregnant. Has been using condoms when having sex    ROS: No headache, nausea, vomiting, abdominal pain, n/v, diarrhea,   Admits to anxiety, trouble sleeping, occasional constipation    O:  /71 (BP Location: Left arm, Patient Position: Chair, Cuff Size: Adult Regular)  Pulse 118  Temp 97.4  F (36.3  C) (Oral)  Wt 110 lb 12.8 oz (50.3 kg)  LMP 05/05/2017 (Approximate)  BMI 21.28 kg/m2  General: On Chair, no acute distress  HEENT: No " conjunctivitis, EOM grossly intact, oral mucosa pink and moist, no cervical adenopathy palpated  Heart: RRR, no murmurs, rubs, or clicks  Lungs: CTA all fields, no wheeze, no crackles, no respiratory distress  Abd: Soft, non tender, not distended, no guarding, no rebound, normoactive bowel sounds,   Extremites: No edema, no lesions noted, pulses present bilaterally and equal, sensation intact upper and lower extremites  Skin: No lesions, no edema, excorations, or rashes noted      A/P:  1. Chronic viral hepatitis B without delta agent and without coma (H)  From viral panel, patient has chronic hepatitis B.  Patient is currently not symptomatic, does not appear jaundiced, has no abdominal pain, has no nausea, vomiting.  Patient's CMP was also normal.  She will have lab tests today and will be referred to GI for further management.  We also discussed having her  come in for check up of his hepatitis B status and to receive treatment if positive.  - CBC w/ Plt. ([facility])  - INR ([facility])  - Hep B Virus DNA Quant Real Time PCR  - GASTROENTEROLOGY ADULT REF CONSULT ONLY    2. Low vitamin D level  Patient has low vitamin D.  If this can contribute partially to her depressed mood, anxiety, feeling tired.  However, patient likely has components of anxiety disorder or PTSD from her seizure events.  Please see below.  We will start patient on high-dose vitamin D to take once a week for 8 weeks and will switch her to daily medication after this.  - cholecalciferol (VITAMIN D3) 73383 UNITS capsule; Take 1 capsule (50,000 Units) by mouth once a week  Dispense: 8 capsule; Refill: 0    3. Anxiety disorder, unspecified type  Patient had normal Jennifer screen, however during examination, patient reportedly stated that she felt anxious when she was by herself, had trouble sleeping, needed her  to be home when she fell asleep all stemming from the seizure events that she has been expressing.  She reports that this is  been going on for over 4 years.  As well, patient reports fever of going outside, talking to strangers.  Patient may have components of Agoraphobia.  We extensively discussed seeing a counselor in addition of medications.  Patient expressed reluctance and refused to see a counselor at this time due to having to talk to a new person.  We will start patient on Zoloft at this time.  Will follow up in 4 weeks and titrate up if patient continues to have anxiety.  She will benefit from counseling.  - Zoloft- titrate start  - Encourage councellor at next session  - follow up in 1 month and increase dosage of zoloft if not enough improvement    4. Seizures  Patient was started on Keppra by neurologist.  Due to teratogenic effect of this medication, we discussed starting her on a more consistant birth control.  At this time patient declined starting a medication.  However, she reports that she will continue to use condoms.  Patient states that she is not pregnant, last menstrual period was 5/5/17.  Patient will continue on Keppra, has a follow-up appointment with Dr. Polk in July.   -  was asked to video seizure event if it should occur again  - Condoms given to prevent pregnancy, discuss patch, pill, or depot at next visit  - Follow up in 1 month    Elvin Brizuela  Family Medicine Resident PGY2

## 2017-06-01 NOTE — PATIENT INSTRUCTIONS
Your referral has been scheduled for:    [facility] [address], [address]  [place], [place] [address]  Phone: [phone]  Consult with Hepatology  [phone]  Date: Wednesday June 21 2017   Time: 7:55 AM Buck Crawley NP     If you have any questions or need to reschedule please call the number listed above.  Any other concerns please call our referral coordinator at [phone]    Le  Care Coordinator     [facility] F/U APPOINTMENT:  [facility] [address], [address]  [place], [place] [address]  Phone: [phone]  Date:  Tuesday October 31, 2017  Time:  9:25 AM with Dr. Crawley.   has been requested for this appointment.  Annette Velez  10/3/17  Given to Anastacio Hemphill

## 2017-06-02 NOTE — PROGRESS NOTES
Preceptor attestation:  Vital signs reviewed: /71 (BP Location: Left arm, Patient Position: Chair, Cuff Size: Adult Regular)  Pulse 118  Temp 97.4  F (36.3  C) (Oral)  Wt 110 lb 12.8 oz (50.3 kg)  LMP 05/05/2017 (Approximate)  BMI 21.28 kg/m2    Patient seen and discussed with the resident. Assessment and plan reviewed with resident and agreed upon.    Supervising physician: Chiquis Lozada MD  [facility]

## 2017-06-05 ENCOUNTER — TRANSFERRED RECORDS (OUTPATIENT)
Dept: HEALTH INFORMATION MANAGEMENT | Facility: CLINIC | Age: 27
End: 2017-06-05

## 2017-06-21 ENCOUNTER — TRANSFERRED RECORDS (OUTPATIENT)
Dept: HEALTH INFORMATION MANAGEMENT | Facility: CLINIC | Age: 27
End: 2017-06-21

## 2017-06-27 ENCOUNTER — DOCUMENTATION ONLY (OUTPATIENT)
Dept: FAMILY MEDICINE | Facility: CLINIC | Age: 27
End: 2017-06-27

## 2017-06-27 DIAGNOSIS — F41.9 ANXIETY DISORDER, UNSPECIFIED TYPE: ICD-10-CM

## 2017-06-27 NOTE — PROGRESS NOTES
Flavia contacted me regarding patient. She stated that patient believes she has insurance coverage, someone named Court has been helping her, but when Fl[place] she does not have active coverage. Contacted [facility] through [facility] ([phone]) and spoke with Nohemy. She informed me that patient is closed because she no longer qualifies for insurance assistance because of their income. With that, patient would not qualify for [place] either. Nohemy stated that patient would only be able to get insurance through the market place, so they would have to find a commercial plan.    Updated Flavia and suggested that patient work with Court, who was helping her previously. I also provided Flavia with a list of Jennifer speaking [facility] Navigators in [facility] that patient can speak to about her options. Flavia was going to work with Anastacio () to provide patient with these options.    Alejandra Connolly

## 2017-07-12 ENCOUNTER — TELEPHONE (OUTPATIENT)
Dept: FAMILY MEDICINE | Facility: CLINIC | Age: 27
End: 2017-07-12

## 2017-07-12 NOTE — TELEPHONE ENCOUNTER
"Dannielle states that they received + Hep B results on patient and is wondering if pt is pregnant?  Told her as of 5/31/17 office visit note that pt reported that she was \"using condoms every time she had intercourse so there was no chance she was pregnant\". (of note pt had neg UPT on 4/17/17).  Confirmed home address with Dannielle./[place]  "

## 2017-09-13 DIAGNOSIS — D50.9 IRON DEFICIENCY ANEMIA, UNSPECIFIED IRON DEFICIENCY ANEMIA TYPE: ICD-10-CM

## 2017-09-13 RX ORDER — FERROUS SULFATE 325(65) MG
325 TABLET ORAL 2 TIMES DAILY
Qty: 60 TABLET | Refills: 2 | Status: SHIPPED | OUTPATIENT
Start: 2017-09-13 | End: 2017-10-03

## 2017-10-03 ENCOUNTER — DOCUMENTATION ONLY (OUTPATIENT)
Dept: PSYCHOLOGY | Facility: CLINIC | Age: 27
End: 2017-10-03

## 2017-10-03 ENCOUNTER — OFFICE VISIT (OUTPATIENT)
Dept: FAMILY MEDICINE | Facility: CLINIC | Age: 27
End: 2017-10-03

## 2017-10-03 VITALS
DIASTOLIC BLOOD PRESSURE: 68 MMHG | BODY MASS INDEX: 20.54 KG/M2 | SYSTOLIC BLOOD PRESSURE: 113 MMHG | TEMPERATURE: 98 F | WEIGHT: 108.8 LBS | HEART RATE: 8 BPM | HEIGHT: 61 IN

## 2017-10-03 DIAGNOSIS — F41.9 ANXIETY DISORDER, UNSPECIFIED TYPE: ICD-10-CM

## 2017-10-03 DIAGNOSIS — B18.1 CHRONIC VIRAL HEPATITIS B WITHOUT DELTA AGENT AND WITHOUT COMA (H): ICD-10-CM

## 2017-10-03 DIAGNOSIS — Z23 NEED FOR VACCINATION: ICD-10-CM

## 2017-10-03 DIAGNOSIS — R56.9 SEIZURES (H): Primary | ICD-10-CM

## 2017-10-03 RX ORDER — LEVETIRACETAM 500 MG/1
500 TABLET ORAL 2 TIMES DAILY
COMMUNITY
End: 2017-10-23

## 2017-10-03 RX ORDER — PRENATAL VIT/IRON FUM/FOLIC AC 27MG-0.8MG
1 TABLET ORAL DAILY
Qty: 100 TABLET | Refills: 3 | Status: SHIPPED | OUTPATIENT
Start: 2017-10-03 | End: 2017-11-03

## 2017-10-03 RX ORDER — LANOLIN ALCOHOL/MO/W.PET/CERES
800 CREAM (GRAM) TOPICAL DAILY
Qty: 30 TABLET | Refills: 0 | Status: SHIPPED | OUTPATIENT
Start: 2017-10-03 | End: 2017-10-23

## 2017-10-03 NOTE — PROGRESS NOTES
Behavioral Health Team,    Patient is being referred for mental health services. Please advise if we are able to see patient for in house treatment or if a community option would be best.    Thank you.     Ariela  Referral Coordinator

## 2017-10-03 NOTE — PATIENT INSTRUCTIONS
- Make appointment with neurology for followup- especially to discuss the Sleep walking  - Make appointment with GI- make sure to discuss possibility of pregnancy for HIB  - Take the keppra, zoloft  - We'll call you for possible  Psych referral for [facility]  - Continue the prenatal and folic      Message has been sent to  team to advise for mental health referral  See documentation encounter for details.  Ariela  10/03/17

## 2017-10-03 NOTE — NURSING NOTE
name: Anastacio (Mary Jo) Joby  Language: Jennifer  Agency: [employer]  Phone number: [phone]

## 2017-10-03 NOTE — PROGRESS NOTES
Please schedule Ms. Watts for intake with Dr. Lombardi to assess current mental health and determine if enrollment in [facility] would be appropriate.  Could also consider enrollment in [facility] if Ms. Watts meets criteria for this and would be interested in program (Dr. Lombardi can assess and discuss with her after intake if deemed appropriate).  Dr. Lombardi currently has one opening in his schedule on 10/12 which could be used for this intake if Ms. Watts is available at this time.  Would need to schedule .  If this time does not work for Ms. Watts, but she is interested in meeting with Dr. Lombardi for possible enrollment in the group, I will ask for Dr. Lombardi to follow up with the patient to find a suitable time to meet that would allow for participation in the group at the earliest opportunity.    Let me know if there are questions or if you would like additional follow up from me on the care of this patient.  Thanks!  Ana Jordan, Ph.D., LP

## 2017-10-03 NOTE — PROGRESS NOTES
"  Family History   Problem Relation Age of Onset     DIABETES No family hx of      Coronary Artery Disease No family hx of      Other Cancer No family hx of      Social History     Social History     Marital status:      Spouse name: N/A     Number of children: N/A     Years of education: N/A     Social History Main Topics     Smoking status: Never Smoker     Smokeless tobacco: Never Used     Alcohol use No     Drug use: No     Sexual activity: Not Asked     Other Topics Concern     None     Social History Narrative       Nursing Notes:   Chante Nobles  10/3/2017 10:20 AM  Signed   name: Anastacio Hemphill (Htoo)  Language: Jennifer  Agency: [employer]  Phone number: [phone]  Chief Complaint   Patient presents with     RECHECK     f/u from last visit, she is feeling better but something still bothers her, she feels more down, she is not in her mind or her body      Blood pressure 113/68, pulse (!) 8, temperature 98  F (36.7  C), temperature source Oral, height 5' 1\" (154.9 cm), weight 108 lb 12.8 oz (49.4 kg), last menstrual period 09/24/2017, not currently breastfeeding.    S:  Patient reports that she is feeling better, has not followed up with neurologist due to insurance issues. Patient reports that since she started the Keppra, no more seizures. But feels like she is sleep walking more, and feels off.     Patient reports feeling more sad and down. She reports crying at times and thinking about the many different things. No thoughts of hurting self or others. PHQ 9 is 8 with somewhat difficult time working    Patient reports taking the medication consistently.     O:  /68  Pulse (!) 8  Temp 98  F (36.7  C) (Oral)  Ht 5' 1\" (154.9 cm)  Wt 108 lb 12.8 oz (49.4 kg)  LMP 09/24/2017 (Approximate)  BMI 20.56 kg/m2  General: On Chair, no acute distress  HEENT: No conjunctivitis, EOM grossly intact, oral mucosa pink and moist, no cervical adenopathy palpated  Heart: RRR, no murmurs, rubs, or " clicks  Lungs: CTA all fields, no wheeze, no crackles, no respiratory distress  Abd: Soft, non tender, not distended, no guarding, no rebound, normoactive bowel sounds,   Extremites: No edema, no lesions noted, pulses present bilaterally and equal,   Skin: No lesions, no edema, excorations, or rashes noted    A/P:  1. Anxiety disorder, unspecified type  Patient reports difficulty sleeping, racing mind, hypnagogic hallucinations of soldiers from [place].  Patient also notes that she has mild distress and from PHQ 9 has some difficulty with day-to-day task.  Patient may benefit from psychology behavioral counseling or the current group.  Patient is agreeable for this treatment.  Patient has also stopped taking her Zoloft due to insurance issues.  Has been restarted today.  In 1 month, can reassess at that time, can increase Zoloft to 100 mg if improvement has been seen, if not, consider switching to other medication.  Noted the Zoloft can also increase her sleepwalking tendencies.  - sertraline (ZOLOFT) 50 MG tablet; Take 1 tablet orally daily  Dispense: 30 tablet; Refill: 1  - PSYCHOLOGY REFERRAL; Future    2. Seizures (H)  Seizures have since been improving since being started on Keppra.  However, patient reports sleepwalking on medication.  This is a known side effect discussed that patient can be gently woken if this should occur.  Patient will follow up with neurology in the next month and will discuss this side effect.  - folic acid (FOLVITE) 400 MCG tablet; Take 2 tablets (800 mcg) by mouth daily  Dispense: 30 tablet; Refill: 0  - Prenatal Vit-Fe Fumarate-FA (PRENATAL MULTIVITAMIN PLUS IRON) 27-0.8 MG TABS per tablet; Take 1 tablet by mouth daily  Dispense: 100 tablet; Refill: 3    4. Chronic viral hepatitis B without delta agent and without coma (H)  Patient is positive for hepatitis B however, patient was unable to get viral load due to insurance issues.  Will follow up with GI this month.  Patient would also  like to have a baby and become pregnant and will discuss if further changes in treatment needed at that time.    Elvin Brizuela  Family Medicine Resident PGY3

## 2017-10-03 NOTE — NURSING NOTE
Norman Watts      1.  Has the patient received the information for the influenza vaccine? YES    2.  Does the patient have any of the following contraindications?     Allergy to eggs? No     Allergic reaction to previous influenza vaccines? No     Any other problems to previous influenza vaccines? No     Paralyzed by Guillain-[place] syndrome? No     Currently pregnant? NO     Current moderate or severe illness? No    Vaccination given by KEYONA Bhakta  .  Recorded by Chante RAND

## 2017-10-03 NOTE — MR AVS SNAPSHOT
After Visit Summary   10/3/2017    Norman Watts    MRN: [id number]           Patient Information     Date Of Birth          1990        Visit Information        Provider Department      10/3/2017 10:20 AM Elvin Brizuela DO [facility]        Today's Diagnoses     Seizures (H)    -  1    Anxiety disorder, unspecified type          Care Instructions    - Make appointment with neurology for followup- especially to discuss the Sleep walking  - Make appointment with GI- make sure to discuss possibility of pregnancy for HIB  - Take the keppra, zoloft  - We'll call you for possible  Psych referral for [facility]  - Continue the prenatal and folic          Follow-ups after your visit        Additional Services     PSYCHOLOGY REFERRAL       Patient prefers to stop at [facility] desk    Reason for Referral: Assess if candidate for [facility] group. Has history anxiety. May have PTSD as seeing images of soldiers in the past.      needed: Yes  Language: Jennifer    May leave message on voicemail: Yes                  Future tests that were ordered for you today     Open Future Orders        Priority Expected Expires Ordered    PSYCHOLOGY REFERRAL Routine  3/3/2018 10/3/2017            Who to contact     Please call your clinic at [phone] to:    Ask questions about your health    Make or cancel appointments    Discuss your medicines    Learn about your test results    Speak to your doctor   If you have compliments or concerns about an experience at your clinic, or if you wish to file a complaint, please contact [facility] Patient Relations at [phone] or email us at [email or web address]         Additional Information About Your Visit        MyChart Information     Conyac is an electronic gateway that provides easy, online access to your medical records. With Conyac, you can request a clinic appointment, read your test results, renew a prescription or communicate  "with your care team.     To sign up for [employer] visit the website at [email or web address]   You will be asked to enter the access code listed below, as well as some personal information. Please follow the directions to create your username and password.     Your access code is: [email or web address]  Expires: 2018 10:45 AM     Your access code will  in 90 days. If you need help or a new code, please contact your [facility] or call [phone] for assistance.        Care EveryWhere ID     This is your Care EveryWhere ID. This could be used by other organizations to access your [facility] medical records  [id number]        Your Vitals Were     Pulse Temperature Height Last Period BMI (Body Mass Index)       8 98  F (36.7  C) (Oral) 5' 1\" (154.9 cm) 2017 (Approximate) 20.56 kg/m2        Blood Pressure from Last 3 Encounters:   10/03/17 113/68   17 111/71   17 103/67    Weight from Last 3 Encounters:   10/03/17 108 lb 12.8 oz (49.4 kg)   17 110 lb 12.8 oz (50.3 kg)   17 110 lb 3.2 oz (50 kg)                 Today's Medication Changes          These changes are accurate as of: 10/3/17 10:45 AM.  If you have any questions, ask your nurse or doctor.               Start taking these medicines.        Dose/Directions    folic acid 400 MCG tablet   Commonly known as:  FOLVITE   Used for:  Seizures (H)   Started by:  Elvin Brizuela DO        Dose:  800 mcg   Take 2 tablets (800 mcg) by mouth daily   Quantity:  30 tablet   Refills:  0       prenatal multivitamin plus iron 27-0.8 MG Tabs per tablet   Used for:  Seizures (H)   Started by:  Elvin Brizuela DO        Dose:  1 tablet   Take 1 tablet by mouth daily   Quantity:  100 tablet   Refills:  3            Where to get your medicines      These medications were sent to [facility] - [place], [place] - [address], [place] [address]     Phone:  [phone]     folic acid 400 " MCG tablet    prenatal multivitamin plus iron 27-0.8 MG Tabs per tablet    sertraline 50 MG tablet                Primary Care Provider Office Phone # Fax #    Elvin Brizuela [phone]       [address] [address]        Equal Access to Services     FRITZ FLORES : Juan Manuel nieves hadmichaelo Soomaali, waaxda luqadaha, qaybta kaalmada adeegyada, summer ramírezdivinejd esparza. So [facility] [phone].    ATENCIÓN: Si habla español, tiene a yarbrough disposición servicios gratuitos de asistencia lingüística. Renata al [phone].    We comply with applicable federal civil rights laws and [place] laws. We do not discriminate on the basis of race, color, national origin, age, disability, sex, sexual orientation, or gender identity.            Thank you!     Thank you for choosing [facility]  for your care. Our goal is always to provide you with excellent care. Hearing back from our patients is one way we can continue to improve our services. Please take a few minutes to complete the written survey that you may receive in the mail after your visit with us. Thank you!             Your Updated Medication List - Protect others around you: Learn how to safely use, store and throw away your medicines at www.disposemymeds.org.          This list is accurate as of: 10/3/17 10:45 AM.  Always use your most recent med list.                   Brand Name Dispense Instructions for use Diagnosis    cholecalciferol 41443 UNITS capsule    VITAMIN D3    8 capsule    Take 1 capsule (50,000 Units) by mouth once a week    Low vitamin D level       ferrous sulfate 325 (65 FE) MG tablet    IRON    60 tablet    Take 1 tablet (325 mg) by mouth 2 times daily    Iron deficiency anemia, unspecified iron deficiency anemia type       folic acid 400 MCG tablet    FOLVITE    30 tablet    Take 2 tablets (800 mcg) by mouth daily    Seizures (H)       ibuprofen 600 MG tablet    ADVIL/MOTRIN    90  tablet    Take 1 tablet (600 mg) by mouth every 6 hours as needed for moderate pain    Episodic tension-type headache, not intractable       levETIRAcetam 500 MG tablet    KEPPRA     Take 500 mg by mouth 2 times daily        polyethylene glycol powder    MIRALAX    510 g    Take 17 g (1 capful) by mouth daily    Constipation, unspecified constipation type       prenatal multivitamin plus iron 27-0.8 MG Tabs per tablet     100 tablet    Take 1 tablet by mouth daily    Seizures (H)       sertraline 50 MG tablet    ZOLOFT    30 tablet    Take 1 tablet orally daily    Anxiety disorder, unspecified type

## 2017-10-03 NOTE — PROGRESS NOTES
I have scheduled patient for appointment with Dr. Lombardi on 10/12/107 and requested an .   Ariela  10/03/17      Gave information to Anastacio.

## 2017-10-03 NOTE — PROGRESS NOTES
Preceptor attestation:  Patient seen and discussed with the resident. Assessment and plan reviewed with resident and agreed upon.  Supervising physician: David Stein MD  [facility]

## 2017-10-12 ENCOUNTER — OFFICE VISIT (OUTPATIENT)
Dept: PSYCHOLOGY | Facility: CLINIC | Age: 27
End: 2017-10-12

## 2017-10-12 DIAGNOSIS — F32.A DEPRESSION, UNSPECIFIED DEPRESSION TYPE: Primary | ICD-10-CM

## 2017-10-12 NOTE — PROGRESS NOTES
Behavioral Health Progress Note    Client Legal Name: Norman Watts   Client Preferred Name: Norman   Service Type: Individual  Length of Visit: 40 minutes (Patient arrived 20 minutes late)  Attendees:  (DARLING); Patient's  (Present at request of patient)  Complexity: An  is used not only to interpret language, since the patient does not speak English, but also to help with the complexity of understandings across cultures, since the patient is not well integrated in the larger [place] culture.    Identifying Information and Presenting Problem:  The patient is a 27 year old Jennifer female referred for diagnostic assessment and potential enrollment in [facility].     Treatment Objective(s) Addressed in This Session:  Risk/Safety assessment and planning    Progress on / Status of Treatment Objective(s) / Homework:  Achieved / completed to satisfaction    Topics Discussed/Interventions Provided:    Oriented to the behavioral health service and this provider's role. Discussed the rights to and limits to confidentiality, the nature of the integrated care team and shared chart, as well as this provider's role as postdoctoral fellow and the role of supervision. The patient was given an information handout on this provider's postdoctoral fellow status, which was reviewed as part of the orientation process. Expressed understanding and agreement with the information discussed, and denied having questions.      Discussed the purpose of the current visit. Described her understanding of the purpose of the current visit as being to get assistance with managing a number of problematic symptoms, including fatigue and loss of energy, decreased appetite, difficulties engaging in or completing activities, and feelings of isolation and aloneness. Indicated experiencing these symptoms for some time, though noted feeling she has been more fatigued recently due to a recently prescribed medication (Unable to recall the  "name of the medication during the current visit). Of note, also endorsed difficulties concerning suicidal ideation and \"thoughts about killing [herself]\" that she has been experiencing for the past year.       Discussed history of suicidal ideation. The patient reported a history of active suicidal ideation with plan (i.e., Hanging; Cutting wrists with knife), and fluctuating intent, beginning one year ago (Contextual information regarding initial onset of suicidal ideation not obtained during current visit). Noted her ideation \"comes and goes,\" though estimated she has experienced active suicidal ideation with plan (without intent) approximately two or three times per week over the past year (Most recent occasion being yesterday 10/11/2017). Reported experiencing active suicidal ideation with plan and intent less frequently, typically in situations wherein she is feeling more isolated or alone, with most recent occasion being one month ago wherein she sought out a rope before changing her mind when thinking about her parents. Of note, denied a history of suicide attempts or non-suicidal self-injurious behaviors.      Risk assessment. Today Norman Watts reports recent active suicidal ideation with plan (Hanging) without intent, though denies current suicidal ideation. She has notable risk factors for self-harm, including anxiety, depression, and comorbid medical condition of seizures. However, risk is mitigated by commitment to family, sobriety, absence of past attempts, ability to volunteer a safety plan (See below), and history of seeking help when needed. Therefore, based on all available evidence including the factors cited above, she does not appear to be at imminent risk for self-harm, does not meet criteria for a 72-hr hold, and therefore remains appropriate for ongoing outpatient level of care with close follow-up being warranted.      Safety planning. Collaboratively developed comprehensive multi-step safety " plan (See patient instructions) with the patient, which due to the language barrier was thoroughly reviewed during its development to ensure the patient's understanding. Appeared to have a good understanding, and expressed confidence in her ability to utilize the safety plan and incorporated crisis resources if needed. Problem-solved with the patient regarding logistics for using crisis resources if her  or family members were not available (Patient does not have personal phone), with the patient expressing confidence in her ability to reach out to a neighbor in order to request to use their phone. Also provided the patient with an additional physical copy of crisis resources for the patient to keep in her possession, as well as an informational brochure with the address and phone number of the [facility]. Expressed appreciation.       Discussed plan moving forward. Agreed the patient would follow-up with this provider in one week to check-in and complete the originally scheduled diagnostic assessment. Reviewed this provider's schedule and identified a date/time wherein the patient and her  would be able to meet with this provider. Appointment scheduled for 1:00PM on 11/20/2017. Confirmed that the patient's  would be providing transportation to the appointment. Of note, this provider to reach out to the patient's PCP (DR. Brizuela) for the purpose of care coordination regarding symptoms discussed during the current visit.     Assessment: The patient appeared to be active and engaged in today's session and was receptive to feedback.     Mental Status: Paw appeared generally alert and oriented. Appeared initially guarded. Dress was casual and appropriate to the weather and occasion. Grooming and hygiene were good. Eye contact was limited. Speech was of normal volume and rate and was clear, coherent, and relevant. Mood was depressed with congruent affect. Thought processes were  relevant, logical and goal-directed. Thought content was WNL with no evidence of psychotic or paranoid features. See above for information pertaining to suicidal ideation and self-harm. No evidence of homicidal ideation, intent, or plans. Reported difficulties with memory, though memory not thoroughly assessed. Insight and judgment appeared fair and patient exhibited good impulse control during the appointment.     Does the patient appear to be at imminent risk of harm to self/others at this time? No, though close follow-up is warranted.    The session was necessary to assess risk/safety and to develop safety plan.    Diagnosis (DSM-5):  311 (F32.9) Unspecified Depressive Disorder    Plan:  1. Follow-up with this provider scheduled for 10/20/2017.  2. Patient to utilize safety plan and crisis resources as needed.  3. Plan to complete diagnostic assessment and discuss options for mental health services (e.g., [facility], [facility]) during next visit.  4. This provider to consult with the patient's PCP (DR. Brizuela) for purpose of care coordination.       Nathaniel Lombardi, Ph.D.  Behavioral Health Fellow      NOTE: Treatment plan to be completed following completion of diagnostic assessment, pending establishment of ongoing care.  Diagnostic assessment to be completed during next visit.

## 2017-10-12 NOTE — MR AVS SNAPSHOT
After Visit Summary   10/12/2017    Norman Watts    MRN: [id number]           Patient Information     Date Of Birth          1990        Visit Information        Provider Department      10/12/2017 8:30 AM Lombardi, Nathaniel, PhD [facility]        Care Instructions    Safety Plan  Step 1: Warning Signs (thoughts, images, mood, situations, behaviors) that a crisis may be developing  1. Experiencing a feeling of fading away.  2. Feeling more forgetful.  3. Feeling more isolated and alone.  4. Thinking about the afterlife.   5. Feeling bad about myself and crying.     Step 2: Internal coping strategies - Things I can do to take my mind off my problems without contacting another person (relaxation technique, physical activity)  1. Listen to music.  2. Go for a walk.    Step 3: People whom I can ask for help  Name: [facility]   Phone: [phone]  Name:  ([place]) Phone: [phone]      Step 4: Professionals or agencies I can contract during a crisis  - [facility]: [phone] (crisis counseling)  - [facility] Suicide Hotline: 783.619.4705 (suicidal thoughts)  - [facility]: [phone] (psychiatric crisis, but can transport self)  - [facility]: [phone] (psychiatric crisis, but cannot transport self)  - [facility] Adult Crisis Line: [phone] (crisis counseling and walk-in urgent care mental health)  - [facility] Multilingual Crisis Line: [phone]  -Suicide Prevention Lifeline Phone: 3-931-493-SOSD (2044)  -If in immediate danger of harming self/others, call 9-1-1.    Step 5: Making the environment safe   1. Keep ropes/cords from the residence.    The things that are most important to me and worth living for are:  -My family    Ignacio G [employer] Marvin BOND (2008). Suicide Safety Plan Template. Publisher: [facility] ([employer] Mental Health Program and Suicide Prevention Resource Center          Follow-ups after your visit        Your next 10  appointments already scheduled     2017  1:10 PM CDT   Return Visit with Elvin Brizuela DO   [facility])    [address] [place] [address]   [phone]              Who to contact     Please call your clinic at [phone] to:    Ask questions about your health    Make or cancel appointments    Discuss your medicines    Learn about your test results    Speak to your doctor   If you have compliments or concerns about an experience at your clinic, or if you wish to file a complaint, please contact [facility] Patient Relations at [phone] or email us at [email or web address]         Additional Information About Your Visit        MyChart Information     Narviit is an electronic gateway that provides easy, online access to your medical records. With [employer], you can request a clinic appointment, read your test results, renew a prescription or communicate with your care team.     To sign up for Narviit visit the website at [email or web address]   You will be asked to enter the access code listed below, as well as some personal information. Please follow the directions to create your username and password.     Your access code is: [email or web address]  Expires: 2018 10:45 AM     Your access code will  in 90 days. If you need help or a new code, please contact your [facility] or call [phone] for assistance.        Care EveryWhere ID     This is your Care EveryWhere ID. This could be used by other organizations to access your [place] medical records  [id number]        Your Vitals Were     Last Period                   2017 (Approximate)            Blood Pressure from Last 3 Encounters:   10/03/17 113/68   17 111/71   17 103/67    Weight from Last 3 Encounters:   10/03/17 108 lb 12.8 oz (49.4 kg)   17 110 lb 12.8 oz (50.3 kg)   17 110 lb 3.2 oz (50 kg)              Today,  you had the following     No orders found for display       Primary Care Provider Office Phone # Fax #    Elvin Brizuela [phone]       [place] [address]        Equal Access to Services     FRITZ FLORES : Hadii aad ku hadmichaeldoyle Soaram, waaxda luqadaha, qaybta kaalmada adeegyada, summer rigoin hayaamikayla meadows brigidajd esparza. So [facility] [phone].    ATENCIÓN: Si habla español, tiene a yarbrough disposición servicios gratuitos de asistencia lingüística. Llame al [phone].    We comply with applicable federal civil rights laws and [place] laws. We do not discriminate on the basis of race, color, national origin, age, disability, sex, sexual orientation, or gender identity.            Thank you!     Thank you for choosing [facility]  for your care. Our goal is always to provide you with excellent care. Hearing back from our patients is one way we can continue to improve our services. Please take a few minutes to complete the written survey that you may receive in the mail after your visit with us. Thank you!             Your Updated Medication List - Protect others around you: Learn how to safely use, store and throw away your medicines at www.disposemymeds.org.          This list is accurate as of: 10/12/17  9:40 AM.  Always use your most recent med list.                   Brand Name Dispense Instructions for use Diagnosis    cholecalciferol 81455 UNITS capsule    VITAMIN D3    8 capsule    Take 1 capsule (50,000 Units) by mouth once a week    Low vitamin D level       folic acid 400 MCG tablet    FOLVITE    30 tablet    Take 2 tablets (800 mcg) by mouth daily    Seizures (H)       levETIRAcetam 500 MG tablet    KEPPRA     Take 500 mg by mouth 2 times daily        prenatal multivitamin plus iron 27-0.8 MG Tabs per tablet     100 tablet    Take 1 tablet by mouth daily    Seizures (H)       sertraline 50 MG tablet    ZOLOFT    30 tablet    Take 1 tablet orally  daily    Anxiety disorder, unspecified type

## 2017-10-12 NOTE — Clinical Note
Hi Dr. Brizuela,  I met with this patient this morning, and ended up doing a risk assessment/safety planning visit rather than completing a diagnostic assessment. Of note, the patient reported experiencing increased fatigue/tiredness which she attributed to a recently prescribed medication (Unable to recall the name of the medication), in addition to frequent suicidal ideation over the past year. If you have some time to consult by phone tomorrow, please just let me know. The patient is scheduled to follow-up with me next Friday (I wanted close follow-up).  johnson Benitez

## 2017-10-12 NOTE — PATIENT INSTRUCTIONS
Safety Plan  Step 1: Warning Signs (thoughts, images, mood, situations, behaviors) that a crisis may be developing  1. Experiencing a feeling of fading away.  2. Feeling more forgetful.  3. Feeling more isolated and alone.  4. Thinking about the afterlife.   5. Feeling bad about myself and crying.     Step 2: Internal coping strategies - Things I can do to take my mind off my problems without contacting another person (relaxation technique, physical activity)  1. Listen to music.  2. Go for a walk.    Step 3: People whom I can ask for help  Name: [facility]   Phone: [phone]  Name:  ([place]) Phone: [phone]      Step 4: Professionals or agencies I can contract during a crisis  - [facility]: [phone] (crisis counseling)  - [facility] Suicide Hotline: 940.561.8646 (suicidal thoughts)  - [facility]: [phone] (psychiatric crisis, but can transport self)  - [facility]: [phone] (psychiatric crisis, but cannot transport self)  - [facility] Adult Crisis Line: [phone] (crisis counseling and walk-in urgent care mental health)  - [facility] Multilingual Crisis Line: [phone]  -Suicide Prevention Lifeline Phone: 0-690-566-AJQW (0765)  -If in immediate danger of harming self/others, call 9-1-1.    Step 5: Making the environment safe   1. Keep ropes/cords from the residence.    The things that are most important to me and worth living for are:  -My family    Ignacio HILLS & Marvin BOND (2008). Suicide Safety Plan Template. Publisher: [facility] ([employer] Mental Health Program and Suicide Prevention Resource Center

## 2017-10-13 NOTE — PROGRESS NOTES
I have reviewed and agree with the behavioral health fellow's documentation for this visit.  I did not personally see the patient.  Ana Jordan, PhD., LP

## 2017-10-20 ENCOUNTER — OFFICE VISIT (OUTPATIENT)
Dept: PSYCHOLOGY | Facility: CLINIC | Age: 27
End: 2017-10-20

## 2017-10-20 DIAGNOSIS — F32.A DEPRESSION, UNSPECIFIED DEPRESSION TYPE: Primary | ICD-10-CM

## 2017-10-20 ASSESSMENT — ANXIETY QUESTIONNAIRES
GAD7 TOTAL SCORE: 5
5. BEING SO RESTLESS THAT IT IS HARD TO SIT STILL: SEVERAL DAYS
7. FEELING AFRAID AS IF SOMETHING AWFUL MIGHT HAPPEN: NOT AT ALL
2. NOT BEING ABLE TO STOP OR CONTROL WORRYING: NOT AT ALL
6. BECOMING EASILY ANNOYED OR IRRITABLE: SEVERAL DAYS
3. WORRYING TOO MUCH ABOUT DIFFERENT THINGS: SEVERAL DAYS
1. FEELING NERVOUS, ANXIOUS, OR ON EDGE: SEVERAL DAYS

## 2017-10-20 ASSESSMENT — PATIENT HEALTH QUESTIONNAIRE - PHQ9
SUM OF ALL RESPONSES TO PHQ QUESTIONS 1-9: 18
5. POOR APPETITE OR OVEREATING: SEVERAL DAYS

## 2017-10-20 NOTE — PATIENT INSTRUCTIONS
-Schedule follow-up visit with Dr. Lombardi (11/1/2017) at 10:30AM (60 min [facility]).     -Schedule visit with Dr. Brizuela (PCP) for next week.     -Use safety plan and crisis resources if needed.    -Will talk more about next steps during next visit.

## 2017-10-20 NOTE — MR AVS SNAPSHOT
After Visit Summary   10/20/2017    Norman Watts    MRN: [id number]           Patient Information     Date Of Birth          1990        Visit Information        Provider Department      10/20/2017 1:00 PM Lombardi, Nathaniel,  [facility]        Care Instructions    -Schedule follow-up visit with Dr. Lombardi (2017) at 10:30AM (60 min [facility]).     -Schedule visit with Dr. Brizuela (PCP) for next week.     -Use safety plan and crisis resources if needed.    -Will talk more about next steps during next visit.           Follow-ups after your visit        Your next 10 appointments already scheduled     2017  1:10 PM CDT   Return Visit with Elvin Brizuela DO   [facility] ([facility])    [address] [place] [address]   [phone]              Who to contact     Please call your clinic at [phone] to:    Ask questions about your health    Make or cancel appointments    Discuss your medicines    Learn about your test results    Speak to your doctor   If you have compliments or concerns about an experience at your clinic, or if you wish to file a complaint, please contact [facility] Patient Relations at [phone] or email us at [email or web address]         Additional Information About Your Visit        MyChart Information     TOA Techn[employer]iest is an electronic gateway that provides easy, online access to your medical records. With [employer], you can request a clinic appointment, read your test results, renew a prescription or communicate with your care team.     To sign up for [employer] visit the website at [email or web address]   You will be asked to enter the access code listed below, as well as some personal information. Please follow the directions to create your username and password.     Your access code is: [email or web address]  Expires: 2018 10:45 AM     Your access code will  in 90 days. If you need help or a new code, please  contact your [facility] or call [phone] for assistance.        Care EveryWhere ID     This is your Care EveryWhere ID. This could be used by other organizations to access your [place] medical records  [id number]        Your Vitals Were     Last Period                   09/24/2017 (Approximate)            Blood Pressure from Last 3 Encounters:   10/03/17 113/68   05/31/17 111/71   05/19/17 103/67    Weight from Last 3 Encounters:   10/03/17 108 lb 12.8 oz (49.4 kg)   05/31/17 110 lb 12.8 oz (50.3 kg)   05/19/17 110 lb 3.2 oz (50 kg)              Today, you had the following     No orders found for display       Primary Care Provider Office Phone # Fax #    Elvin Brizuela  [phone]       [address] [address]        Equal Access to Services     FRITZ FLORES : Hadii aad ku hadasho Soaram, waaxda luqadaha, qaybta kaalmada adeegyada, summer davis . So [facility] [phone].    ATENCIÓN: Si habla español, tiene a yarbrough disposición servicios gratuitos de asistencia lingüística. Llame al [phone].    We comply with applicable federal civil rights laws and [place] laws. We do not discriminate on the basis of race, color, national origin, age, disability, sex, sexual orientation, or gender identity.            Thank you!     Thank you for choosing [facility]  for your care. Our goal is always to provide you with excellent care. Hearing back from our patients is one way we can continue to improve our services. Please take a few minutes to complete the written survey that you may receive in the mail after your visit with us. Thank you!             Your Updated Medication List - Protect others around you: Learn how to safely use, store and throw away your medicines at www.disposemymeds.org.          This list is accurate as of: 10/20/17  2:03 PM.  Always use your most recent med list.                    Brand Name Dispense Instructions for use Diagnosis    cholecalciferol 12509 UNITS capsule    VITAMIN D3    8 capsule    Take 1 capsule (50,000 Units) by mouth once a week    Low vitamin D level       folic acid 400 MCG tablet    FOLVITE    30 tablet    Take 2 tablets (800 mcg) by mouth daily    Seizures (H)       levETIRAcetam 500 MG tablet    KEPPRA     Take 500 mg by mouth 2 times daily        prenatal multivitamin plus iron 27-0.8 MG Tabs per tablet     100 tablet    Take 1 tablet by mouth daily    Seizures (H)       sertraline 50 MG tablet    ZOLOFT    30 tablet    Take 1 tablet orally daily    Anxiety disorder, unspecified type

## 2017-10-20 NOTE — PROGRESS NOTES
Behavioral Health Diagnostic Assessment:  Meeting was: Scheduled  Others present:  (DARLING); Patient's  (Present at request of patient)  Meeting lasted: 55 minutes  Client was: On time  Complexity: An  is used not only to interpret language, since the patient does not speak English, but also to help with the complexity of understandings across cultures, since the patient is not well integrated in the larger [place] culture.    Assessment Summary:   Diagnostic assessment partially completed today. The patient is a 27 year old Jennifer female who was referred for mental health assessment concerning anxious and depressive symptoms, as well as potential enrollment in the [facility]. The patient endorses significant depressive symptoms that appear to meet diagnostic criteria for a Major Depressive Episode, however additional assessment is needed regarding history of depressive symptoms (e.g., Single versus recurrent episode) and potential history of manic/hypomanic symptoms that (If present) could be indicative of Bipolar Disorder. A diagnosis of Unspecified Depressive Disorder is being given today. The patient also endorses recent symptoms appearing consistent with psychosis (i.e., visual hallucinations), however additional assessment is needed regarding both these symptoms (e.g., Contextual variables and potential association with mood symptoms) and presenting complaints/problems more broadly for the purpose of diagnostic clarification. The patient's mental health concerns have been affecting her ability to function in multiple contexts/settings, and have been causing clinically significant distress. The patient is also struggling with ongoing medical concerns (i.e., Seizures). Paw denies acute safety concerns at the time of the current visit, though close follow-up is warranted (See below for risk assessment and safety planning).  Based on the patient's reported symptoms and impact on  functioning, as well as the patient's preferences regarding outpatient mental health services (See below), the plan is to have the patient follow-up with this provider on 11/1/2017 to complete the current diagnostic assessment and to further assess for potential enrollment in the [facility], as well as to work to get the patient established with in-home psychotherapy services.    DSM-V Diagnoses:  311 (F32.9) Unspecified Depressive Disorder  R/O Major Depressive Disorder    Orientation, Recommendations, & Plan:     Rights to and limits to confidentiality were discussed with patient.    Integrated care team and shared chart were discussed with patient and agreed upon.    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    Patient was given informational handout on postdoctoral fellow status and was invited to ask questions. Given the language barrier, the handout was thoroughly reviewed with the patient during the current visit to ensure understanding. Expressed understanding and denied having questions.     Follow-up scheduled for 11/1/2017 with this provider in order to complete diagnostic assessment, further assess for potential enrollment in [facility], and coordinate care.    Plan to get the patient established with in-home psychotherapy services in the community (Per patient preference):    Clinic Coordinator (Ariela Pham) to meet with the patient during visit with PCP (10/23/2017) to discuss referral options and initiate referral process.    Clinic Coordinator to obtain relevant Release of Information forms.    Plan to follow-up on this during appointment on 11/1/2017.    Note: Was informed by behavioral health colleague (Dr. Weeks) on 10/23/2017 that the patient was transported to [facility] for evaluation secondary to psychiatric crisis following the patient's visit with her PCP (Dr. Brizuela). Plan to consult with this provider's supervisor this week regarding  "coordination of care and plan moving forward.    Mental Health Screening Questionnaires:  PHQ-9 SCORE 10/20/2017   Total Score 18     RY-7 SCORE 10/20/2017   Total Score 5     PTSD-PC: 1/4  CAGE AID: Declined to complete (Denies history of substance use)    WHODAS 2.0: Administered during current visit, though response to second item (i.e., Household responsibilities) was omitted. Responses to completed items are available for review in the flow-sheet.     Current Presenting Problems or Complaints (including patient perception of problem and external factors contributing to current dilemma):   Patient reports significant difficulties associated with depressive symptoms. Describes the onset of her symptoms as \"almost two years ago,\" and notes she was experiencing seizures at that time (Denies history of depressive symptoms prior to this). Indicates feeling her symptoms are associated in part to ongoing distress and frustration with her seizures, which review of the patient's medical record indicates she has been experiencing for approximately five to six years. Information pertaining to depressive symptoms (e.g., Course, Duration, Frequency, Associated factors/variables) will be further assessed.    Patient also reports occasionally experiencing symptoms appearing consistent with psychosis, namely visual hallucinations (e.g., Seeing a spirit or ghost standing in front of her) up to several times a month. Reports most recently experiencing this approximately two or three weeks ago. These symptoms will be further assessed for the purpose of diagnostic clarification. Additionally, presenting complaints/problems will be further assessed more broadly to ensure a comprehensive understanding of the patient's difficulties/symptoms.    Review of Symptoms:  Depression: Depressed mood, Anhedonia, Sleep, Energy, Concentration, Appetite, Psychomotor slowing, Suicidal ideation (Recent; Denies current), Worthlessness  Pratibha: Will " be assessed  Psychosis: Occasional visual hallucinations (Most recent two or three weeks ago). Will be further assessed  Anxiety: Will be assessed.  Post Traumatic Stress Disorder: Will be assessed    Patient Strengths and Resources:   Will be assessed.    Previous Mental Health Concerns/Treatment:  History of mental health concerns (Beyond history noted above) will be further assessed.    Outpatient:   None. Of note, provided brief psychoeducation regarding various types of mental health services during the current visit, and assessed the patient's interest in getting established with mental health services. Expressed interest in getting established with in-home psychotherapy services. Agreed to discuss this further during follow-up visit, and to further assess for potential enrollment in the [facility]. Consulted with care coordinator at the clinic regarding in-home psychotherapy services following the current visit for the purpose of care coordination (See above).     Inpatient:   None    Chemical Health History:  Alcohol Use: Denies past/current alcohol use.  Drug Use: Denies past/current drug use.  Prescription Misuse: Denies past/current prescription misuse.  Tobacco Use: Denies past/current tobacco use.    Have you ever thought about cutting down your drug/alcohol use?  Declined to complete  Do you ever get annoyed when people ask you about your drug/alcohol use: Declined to complete  Do you ever feel guilty about your drug/alcohol use: Declined to complete  Do you ever drink alcohol or use drugs in the morning: Declined to complete    Patient past attempts to control alcohol/drug use (including informal approaches or formal treatment):   N/A (Denies history of substance use)    Have there been any consequences related to clients drug use:  N/A (Denies history of substance use)    Mental Status Exam:  Appearance:  No apparent distress, Casually dressed, Well groomed, Dressed appropriately for weather and  "Appears stated age  Behavior/Relationship to Examiner/Demeanor:  Cooperative and Engaged though initially Guarded  Psychomotor Activity: Unremarkable  Speech rate:  Normal  Speech volume:  Normal  Speech coherence:  Normal  Speech spontaneity:  Normal  Mood (subjective report):  depressed  Affect (objective appearance):  Blunted/Flat  Eye Contact: fair  Thought Process (Associations):  Logical, Linear and Goal directed  Thought process (Rate):  Normal  Abnormal Perception:  None  Sensorium:  Alert, Oriented to person, Oriented to place, Oriented to date/time and Oriented to situation  Attention/Concentration:  Normal  Insight:  Good  Judgment:  Good    Suicide Assessment:  Recent suicidal thoughts: Yes (Recent active suicidal ideation with plan [Hanging; Cutting wrists with knife] though without intent; Denies current suicidal ideation)  Past suicidal thoughts: Yes (History of active suicidal ideation with plan [Hanging; Cutting wrists with knife] and fluctuating intent beginning one year ago)  Any attempts in the past: No  Any family/friends/loved ones commit suicide: Yes (Brother)  Plan or considering various methods: None current  Access to guns: Will be assessed (Plan to coordinate care with PCP to ensure this is assessed during visit on 10/23/2017)  Protective factors: no h/o suicide attempt, describes a safety plan, h/o seeking help when needed, none to minimal alcohol use , commitment to family, stable housing and no current ideation/plan/intent  Verbal contract for safety: Yes    Non-Suicidal Self Injurious Behavior:  None     Violence/Homicide Risk Assessment:  Problems with anger management: Yes (Loses temper several times per month; Notes she occasionally \"grabs and throws things\" when feeling upset)  History of violence: No  History of significant damage to property: No  Threat made to harm or kill someone: No  Verbal contract for safety: Yes    Safety Plan:   Paw reported having continued possession of " comprehensive multi-step safety plan, physical copy of crisis resources she has kept in her possession, and informational brochure with address and phone number of the [facility]. The patient agreed to utilize the plan and resources as indicated if the need were to arise. Norman denied current suicidal ideation, denied past or current homicidal ideation, intent, or plan, denied a history of suicide attempts/self-harming behaviors, and identified several protective factors (See above) to self-harm or harm to others. Based on these factors, Norman is considered to be sustainable as an outpatient at this time, with close follow-up being warranted. Appointment scheduled with the patient's PCP (Dr. Brizuela) for 10/23/2017.     Social History and Associated Level of Functioning (See below):  Current Living Arrangements:   Patient lives in house with  and two roommates (Friends). Has been at current residence for two years.    Family/Children:   Patient has three siblings (Currently living in refugee [place]), and two aunts who reside in [place] ([facility]). Patient has no children.    Intimate Relationship/Marriage:   Patient is . Notes she and her  got  slightly over one year ago.    Social Connection:   Identifies one of her aunts ([place], [place]) as primary source of social support. Social connection/support will be further assessed.    Developmental History:   Patient reports a history of learning difficulties (Mathematics; Swazi language). Developmental history will be further assessed. Of note, reports she immigrated to the [place] in [place].     Abuse/Trauma:   Denies history of abuse/trauma.    Work:   Patient is not currently working. Notes she is financially supported by her .    Education:   Patient reports completing grade six while residing in [facility].     Legal:   Denies history of legal concerns.    Cultural/Belief System:   Patient identifies  "as Zoroastrian.    Personal Health:   Patient Active Problem List   Diagnosis     Seizures (H)     Chronic viral hepatitis B without delta agent and without coma (H)     Anxiety disorder, unspecified type     Depression     Current Outpatient Prescriptions   Medication     levETIRAcetam (KEPPRA) 500 MG tablet     folic acid (FOLVITE) 400 MCG tablet     Prenatal Vit-Fe Fumarate-FA (PRENATAL MULTIVITAMIN PLUS IRON) 27-0.8 MG TABS per tablet     sertraline (ZOLOFT) 50 MG tablet     cholecalciferol (VITAMIN D3) 84275 UNITS capsule     No current facility-administered medications for this visit.      Family Health History:   Patient endorses a family history of mental health symptoms (Depression: Brother). Denies a family history of medical illness or chemical dependency.    NOTE: Diagnostic assessment incomplete. Will be completed at next visit.    Primary Care PTSD Screen  In your life, have you ever had any experience that was so frightening, horrible or upsetting that, in the past month, you...    1. Have had nightmares about it or thought about it when you did not want to? Yes  2. Tried hard not to think about it or went out of your way to avoid situations that remind you of it? No  3. Were constantly on guard, watchful, or easily startled? No  4. Felt numb or detached from others, activities, or your surroundings? No    Current research suggests that the results of the PC-PTSD should be considered \"positive\" if a patient answers \"yes\" to any (3) items.      Nathaniel Lombardi, Ph.D.  Behavioral Health Fellow      References    JOHANNA Prajapati., NICOLAS Aguilar., Kimerling, R., Ilya RVALERIA., PELON FerroS., FILIPE Williamson., EFRAIN Adhikari, PORTILLO Fortune., Iqbal, J.I. (2004). The primary care PTSD screen (PC-PTSD): development and operating characteristics. Primary Care Psychiatry, 9, 9-14.  "

## 2017-10-21 ASSESSMENT — ANXIETY QUESTIONNAIRES: GAD7 TOTAL SCORE: 5

## 2017-10-23 ENCOUNTER — OFFICE VISIT (OUTPATIENT)
Dept: FAMILY MEDICINE | Facility: CLINIC | Age: 27
End: 2017-10-23

## 2017-10-23 ENCOUNTER — TELEPHONE (OUTPATIENT)
Dept: FAMILY MEDICINE | Facility: CLINIC | Age: 27
End: 2017-10-23

## 2017-10-23 VITALS
DIASTOLIC BLOOD PRESSURE: 65 MMHG | WEIGHT: 111 LBS | HEIGHT: 62 IN | HEART RATE: 93 BPM | TEMPERATURE: 98.5 F | SYSTOLIC BLOOD PRESSURE: 103 MMHG | BODY MASS INDEX: 20.43 KG/M2

## 2017-10-23 DIAGNOSIS — F41.9 ANXIETY DISORDER, UNSPECIFIED TYPE: ICD-10-CM

## 2017-10-23 DIAGNOSIS — R56.9 SEIZURES (H): ICD-10-CM

## 2017-10-23 RX ORDER — LEVETIRACETAM 500 MG/1
500 TABLET ORAL 2 TIMES DAILY
Qty: 60 TABLET | Refills: 0 | Status: SHIPPED | OUTPATIENT
Start: 2017-10-23 | End: 2017-12-06

## 2017-10-23 RX ORDER — LANOLIN ALCOHOL/MO/W.PET/CERES
800 CREAM (GRAM) TOPICAL DAILY
Qty: 90 TABLET | Refills: 3 | Status: SHIPPED | OUTPATIENT
Start: 2017-10-23 | End: 2018-01-15

## 2017-10-23 ASSESSMENT — ANXIETY QUESTIONNAIRES
2. NOT BEING ABLE TO STOP OR CONTROL WORRYING: MORE THAN HALF THE DAYS
3. WORRYING TOO MUCH ABOUT DIFFERENT THINGS: MORE THAN HALF THE DAYS
6. BECOMING EASILY ANNOYED OR IRRITABLE: MORE THAN HALF THE DAYS
7. FEELING AFRAID AS IF SOMETHING AWFUL MIGHT HAPPEN: MORE THAN HALF THE DAYS
IF YOU CHECKED OFF ANY PROBLEMS ON THIS QUESTIONNAIRE, HOW DIFFICULT HAVE THESE PROBLEMS MADE IT FOR YOU TO DO YOUR WORK, TAKE CARE OF THINGS AT HOME, OR GET ALONG WITH OTHER PEOPLE: SOMEWHAT DIFFICULT
1. FEELING NERVOUS, ANXIOUS, OR ON EDGE: NEARLY EVERY DAY
GAD7 TOTAL SCORE: 15
5. BEING SO RESTLESS THAT IT IS HARD TO SIT STILL: MORE THAN HALF THE DAYS

## 2017-10-23 ASSESSMENT — PATIENT HEALTH QUESTIONNAIRE - PHQ9: 5. POOR APPETITE OR OVEREATING: MORE THAN HALF THE DAYS

## 2017-10-23 NOTE — PROGRESS NOTES
"  Family History   Problem Relation Age of Onset     DIABETES No family hx of      Coronary Artery Disease No family hx of      Other Cancer No family hx of      Social History     Social History     Marital status:      Spouse name: N/A     Number of children: N/A     Years of education: N/A     Social History Main Topics     Smoking status: Never Smoker     Smokeless tobacco: Never Used     Alcohol use No     Drug use: No     Sexual activity: Not Asked     Other Topics Concern     None     Social History Narrative       Nursing Notes:   Chante Nobles  10/23/2017 10:27 AM  Signed   name: Mariam Collins  Language: Jennifer  Agency: [facility] [employer]  Phone number: [phone]  Chief Complaint   Patient presents with     RECHECK     Blood pressure 103/65, pulse 93, temperature 98.5  F (36.9  C), temperature source Oral, height 5' 1.5\" (156.2 cm), weight 111 lb (50.3 kg), last menstrual period 09/24/2017, not currently breastfeeding.      S: Patient is here for follow up after seeing Dr. Lombardii for Councelling last week. Reports that it is helping. Patient reports that she is taking the zoloft although only has helped slightly. Agreeable to increasing at night. Patient would like a medication to help her sleep. Continue to have trouble sleeping. Patient reports being able to sleep from midnight to 8 am but unable to fall asleep after 8.  leaves for work shortly before this time and works nightshift.    Patient also admits to \"almost seizures\". No actual seizures now but when wakes up, has wide eyes red and appears to almost seize as per     Patient would like refill of Keppra. Patient has not seen neurologis but has an appointement on nov 22.     Patient reports has frequent thought of hurting self or suicide only when by herself. Continually reported during visit wanting to hurt self or commit suicide when  is gone. Not able to call ER as no phone. Patient is several miles away from hospital and " "unable to walk rhere. No other contact or friend to ask for help nearby. Patient refused to contract for safety. Would not state that she will not hurt herself or commit suicide when by herself this week or until she see's her councellor again.    O:  /65  Pulse 93  Temp 98.5  F (36.9  C) (Oral)  Ht 5' 1.5\" (156.2 cm)  Wt 111 lb (50.3 kg)  LMP 09/24/2017 (Approximate)  BMI 20.63 kg/m2  General: On chair, quiet, has occasional shaking  Heart: RRR  Lungs: CTA, no Wheeze, no  Extremities: Weak , no edmea  Skin: No lesions or cuts noted on neck, wrists  Psych: Spoke in coherent sentence       A/P:  During visit, patient repeatedly stated that she wants to hurt herself and commit suicide. No thoughts of carrying them out when  is at home but wants to kill self when  is away. Discussed with patient at length of suicidal deation and patient refused to contract for safety continually reporting that she feels like killing herself when  is away. Discussed with behavioural health and patient agreed to go to ER for safety and voluntarily accepts being admitted to the hospital. EMT was called and patient brought to [facility]. Patient initially hesitant to go home and concern for patient's safety at home as she has no phone, lives several miles from nearest hospital, does not speak english.  is also unable to watch patient at night as he works at that time    1. Seizures (H)  Refilled medication. Will follow with neuro next month. May need to increase med as patient appears to continue to have possible seizures in morning upon awakening  - levETIRAcetam (KEPPRA) 500 MG tablet; Take 1 tablet (500 mg) by mouth 2 times daily  Dispense: 60 tablet; Refill: 0  - folic acid (FOLVITE) 400 MCG tablet; Take 2 tablets (800 mcg) by mouth daily  Dispense: 90 tablet; Refill: 3    2. Anxiety disorder, unspecified type  - sertraline (ZOLOFT) 50 MG tablet; Take 2 tablets (100 mg) by mouth daily Take 1 " tablet orally daily  Dispense: 30 tablet; Refill: 1  - Voluntarily went to [facility] for assessment    Elvin Brizuela  Family Medicine Resident PGY3

## 2017-10-23 NOTE — TELEPHONE ENCOUNTER
[facility] phone call message- general phone call:    Reason for call: [facility] calling to talk to triage      Return call needed: Yes    OK to leave a message on voice mail? Yes    Primary language: Jennifer      needed? Yes    Call taken on October 23, 2017 at 2:16 PM by Flavia Plata

## 2017-10-23 NOTE — PROGRESS NOTES
Preceptor attestation:  Patient seen and discussed with the resident. Spoke with  fellow Dr Weeks as well about plan.  Given patient is unable to contract for safety, will place a  hold and transfer to [facility] for further evaluation.   Assessment and plan reviewed with resident and agreed upon.  Supervising physician: Nereida Moralez  [facility]

## 2017-10-23 NOTE — NURSING NOTE
Bipolar Screening    Have you experienced sustained periods of feeling uncharacteristically energetic? yes    Have you had periods of not sleeping, but not feeling tired? yes    Have you felt that your thoughts were racing and couldn't be slowed down? yes    Have you had periods where you were excessive in sexual interest, spending money, or taking unusual risks? Yes    Primary Care PTSD Screen    In your life, have you ever had any experience that was so frightening, horrible, or upsetting that, in the past month, you...    1.) Have had nightmares about it or thought about it when you did not want to? yes    2.) Tried hard not to think about it or went out of your way to avoid situations that remind you of it? yes    3.) Were constantly on guard, watchful, or easily startled? yes    4.) Felt numb or detached from others, activities, or your surroundings? no

## 2017-10-23 NOTE — MR AVS SNAPSHOT
After Visit Summary   10/23/2017    Norman Watts    MRN: [id number]           Patient Information     Date Of Birth          1990        Visit Information        Provider Department      10/23/2017 10:20 AM Elvin Brizuela DO [facility]        Today's Diagnoses     Seizures (H)        Anxiety disorder, unspecified type           Follow-ups after your visit        Your next 10 appointments already scheduled     2017 10:30 AM CDT   Return Visit with Nathaniel Lombardi, PhD   [facility] ([facility])    [address] [address]   [phone]            2017  1:10 PM CDT   Return Visit with Elvin Brizuela DO   [facility] ([facility])    [address] [address]   [phone]              Who to contact     Please call your clinic at [phone] to:    Ask questions about your health    Make or cancel appointments    Discuss your medicines    Learn about your test results    Speak to your doctor   If you have compliments or concerns about an experience at your clinic, or if you wish to file a complaint, please contact [facility] Patient Relations at [phone] or email us at [email or web address]         Additional Information About Your Visit        MyChart Information     Beibamboot is an electronic gateway that provides easy, online access to your medical records. With Vixar, you can request a clinic appointment, read your test results, renew a prescription or communicate with your care team.     To sign up for Be[employer]amboot visit the website at [email or web address]   You will be asked to enter the access code listed below, as well as some personal information. Please follow the directions to create your username and password.     Your access code is: [email or web address]  Expires: 2018 10:45 AM     Your access code will  in 90 days. If you need help or a new code, please contact your [facility] [place] [facility] or call [phone] for assistance.        Care EveryWhere ID     This is your Care EveryWhere ID. This could be used by other organizations to access your [facility] medical records  [id number]        Your Vitals Were     Pulse Temperature Height Last Period BMI (Body Mass Index)       93 98.5  F (36.9  C) (Oral) 5' 1.5\" (156.2 cm) 09/24/2017 (Approximate) 20.63 kg/m2        Blood Pressure from Last 3 Encounters:   10/23/17 103/65   10/03/17 113/68   05/31/17 111/71    Weight from Last 3 Encounters:   10/23/17 111 lb (50.3 kg)   10/03/17 108 lb 12.8 oz (49.4 kg)   05/31/17 110 lb 12.8 oz (50.3 kg)              Today, you had the following     No orders found for display         Where to get your medicines      These medications were sent to [facility] - [place], [place] - [address], [place] [address]     Phone:  [phone]     folic acid 400 MCG tablet    levETIRAcetam 500 MG tablet    sertraline 50 MG tablet          Primary Care Provider Office Phone # Fax #    Elvin SantiagoDO juli [phone]       [address] [address]        Equal Access to Services     FRITZ FLORES AH: Hadii frandy nieves hadasho Soomaali, waaxda luqadaha, qaybta kaalmada adeegyaefraín, summer esparza. So [facility] [phone].    ATENCIÓN: Si habla español, tiene a yarbrough disposición servicios gratuitos de asistencia lingüística. Llame al [phone].    We comply with applicable federal civil rights laws and [place] laws. We do not discriminate on the basis of race, color, national origin, age, disability, sex, sexual orientation, or gender identity.            Thank you!     Thank you for choosing [facility]  for your care. Our goal is always to provide you with excellent care. Hearing back from our patients is one way we can continue to improve our services. Please take a few minutes to complete the written " survey that you may receive in the mail after your visit with us. Thank you!             Your Updated Medication List - Protect others around you: Learn how to safely use, store and throw away your medicines at www.disposemymeds.org.          This list is accurate as of: 10/23/17 11:52 AM.  Always use your most recent med list.                   Brand Name Dispense Instructions for use Diagnosis    cholecalciferol 64649 UNITS capsule    VITAMIN D3    8 capsule    Take 1 capsule (50,000 Units) by mouth once a week    Low vitamin D level       folic acid 400 MCG tablet    FOLVITE    90 tablet    Take 2 tablets (800 mcg) by mouth daily    Seizures (H)       levETIRAcetam 500 MG tablet    KEPPRA    60 tablet    Take 1 tablet (500 mg) by mouth 2 times daily    Seizures (H)       prenatal multivitamin plus iron 27-0.8 MG Tabs per tablet     100 tablet    Take 1 tablet by mouth daily    Seizures (H)       sertraline 50 MG tablet    ZOLOFT    30 tablet    Take 2 tablets (100 mg) by mouth daily Take 1 tablet orally daily    Anxiety disorder, unspecified type

## 2017-10-24 ENCOUNTER — TELEPHONE (OUTPATIENT)
Dept: FAMILY MEDICINE | Facility: CLINIC | Age: 27
End: 2017-10-24

## 2017-10-24 RX ORDER — SERTRALINE HYDROCHLORIDE 100 MG/1
100 TABLET, FILM COATED ORAL DAILY
Qty: 30 TABLET | Refills: 1 | Status: SHIPPED | OUTPATIENT
Start: 2017-10-24 | End: 2017-12-06

## 2017-10-24 ASSESSMENT — ANXIETY QUESTIONNAIRES: GAD7 TOTAL SCORE: 15

## 2017-10-24 NOTE — TELEPHONE ENCOUNTER
Fax received from [facility]    Medication Clarification Request **Please clarify directions, was this a dose increased. If so, please send new rx. Thank you - Conchita ext [phone]    Sertraline HCL 50 mg Tablet  Take 2 tabs by mouth daily, take 1 tablet by mouth daily.

## 2017-10-25 ENCOUNTER — TELEPHONE (OUTPATIENT)
Dept: PSYCHOLOGY | Facility: CLINIC | Age: 27
End: 2017-10-25

## 2017-10-25 NOTE — TELEPHONE ENCOUNTER
This provider placed outreach call to [facility] (Phone #[phone], OSCAR previously obtained/faxed) to assess the status of the patient following transportation for evaluation secondary to psychiatric crisis. Was informed that the patient had been admitted following evaluation, and continued to be hospitalized at [facility]. Of note, was informed by hospital staff that OSCAR hadn't been received/scanned into their EMR at the time of the current outreach call. Informed hospital staff that this provider would consult with the clinic's Medical Records Dept to have the OSCAR faxed over again. Consulted with Medical Records Dept following the current outreach call, who agreed to fax the OSCAR again on Thursday (10/26/2017) and to request notification when the patient is discharged from the hospital.    Nathaniel Lombardi, Ph.D.  Behavioral Health Fellow

## 2017-10-26 ENCOUNTER — TRANSFERRED RECORDS (OUTPATIENT)
Dept: HEALTH INFORMATION MANAGEMENT | Facility: CLINIC | Age: 27
End: 2017-10-26

## 2017-11-03 ENCOUNTER — OFFICE VISIT (OUTPATIENT)
Dept: FAMILY MEDICINE | Facility: CLINIC | Age: 27
End: 2017-11-03

## 2017-11-03 VITALS
OXYGEN SATURATION: 97 % | DIASTOLIC BLOOD PRESSURE: 71 MMHG | HEART RATE: 101 BPM | BODY MASS INDEX: 21.07 KG/M2 | TEMPERATURE: 97.9 F | WEIGHT: 111.6 LBS | HEIGHT: 61 IN | SYSTOLIC BLOOD PRESSURE: 107 MMHG

## 2017-11-03 DIAGNOSIS — F41.9 ANXIETY DISORDER, UNSPECIFIED TYPE: ICD-10-CM

## 2017-11-03 DIAGNOSIS — F32.A DEPRESSION, UNSPECIFIED DEPRESSION TYPE: Primary | ICD-10-CM

## 2017-11-03 DIAGNOSIS — R56.9 SEIZURES (H): ICD-10-CM

## 2017-11-03 RX ORDER — PRENATAL VIT/IRON FUM/FOLIC AC 27MG-0.8MG
1 TABLET ORAL DAILY
Qty: 100 TABLET | Refills: 3 | Status: SHIPPED | OUTPATIENT
Start: 2017-11-03 | End: 2018-01-15

## 2017-11-03 NOTE — PROGRESS NOTES
Primary Care Behavioral Health Consult Note  Meeting lasted: 25 minutes  Others present: Patient's ; Patient's cousin;  (Anastacio Hemphill)    Identifying Information and Presenting Problem:  Dr. Brizuela requested behavioral health consultation for this patient regarding safety planning and coordination of care following recent hospitalization secondary to psychiatric crisis. The patient is a 27 year old Jennifer individual previously seen by this provider on 10/12/2017 and 10/20/2017 for mental health assessment concerning anxious and depressive symptoms, as well as potential enrollment in the [facility]. The patient agreed to be seen by behavioral health today.    Topics Discussed/Interventions Provided:     Risk/Safety assessment. Informed by Dr. Brizuela prior to current consultation that patient endorsed ongoing active suicidal ideation, though denied associated plan or intent. Consistent information obtained from the patient during the current consultation. Endorsed current/recent active suicidal ideation, though expressly denied associated plan/intent or concerns regarding safety. The patient has notable risk factors for self-harm, including anxiety, depression, and comorbid medical condition of seizures. However, risk is mitigated by commitment to family, sobriety, absence of past attempts, ability to volunteer a safety plan, and history of seeking help when needed. Therefore, based on all available evidence including the factors cited above, the patient was not found to be at imminent risk for self-harm, did not meet criteria for a 72-hr hold, and therefore remained appropriate for ongoing outpatient level of care with close follow-up being warranted. The patient to follow-up with Dr. Brizuela in one week.       Safety planning. Provided the patient with an additional copy of the previously developed safety plan (See patient instructions), and thoroughly reviewed the plan during the current consultation  to ensure the patient's continued understanding. Expressed continued understanding and confidence in her ability to utilize the safety plan and/or crisis resources if needed. Additionally, reported her  recently purchased her a cellular phone (Phone #[phone]) and that she would be able to contact crisis resources directly if needed. Agreed to update the patient's medical record to reflect the patient's new phone number. Of note, completed authorization form during the current consultation permitting verbal communication with both the patient's  (Leeann Phone #[phone]) and the patient's cousin (Anastacio Austin Phone #[phone]) moving forward if needed.       Discussed plan for in-home psychotherapy services. The patient expressed continued interest in getting established with in-home psychotherapy services. Provided brief psychoeducation regarding services offered through [facility] (Phone #[phone]), which include in-home psychotherapy services in addition to a number of additional services (e.g., Psychoeducational classes). Expressed interest in getting established. Agreed this provider would consult with Referral Coordinator at the clinic following the current consultation in order to facilitate the submitting of a referral, to which the patient expressed appreciation. Completed OSCAR for [facility] during the current consultation to permit coordination of care moving forward. Consulted with Referral Coordinator as planned following the current consultation, who subsequently submitted referral to [facility] [place]. Placed outreach call to [facility] following referral submission for purpose of care coordination. Left message requesting callback.       Briefly discussed [facility]. Expressed interest in continuing discussion of the [facility] offered through [facility], though due to time limitations was unable to continue discussion during  current consultation. Agreed this provider would place outreach call to the patient moving forward in order to continue discussion and/or schedule a date/time for the patient to meet with this provider to continue discussion. Of note, reviewed this provider's schedule following the current consultation and observed the patient was previously scheduled to meet with this provider on 12/1/2017.    Assessment:   Mental Status: Paw appeared generally alert and oriented. Dress was casual and appropriate to the weather and occasion. Grooming and hygiene were good. Eye contact was limited. Speech was of normal volume and rate and was clear, coherent, and relevant. Mood was anxious with congruent affect. Thought processes were relevant, logical and goal-directed. Thought content was WNL with no evidence of psychotic or paranoid features. See above for information above pertaining to suicidal ideation/self-harm. No evidence of homicidal ideation, intent, or plans. Memory appeared grossly intact though was not formally assessed. Insight and judgment appeared fair and patient exhibited good impulse control during the consultation.     PHQ-9 SCORE 10/20/2017   Total Score 18     RY-7 SCORE 10/20/2017 10/23/2017   Total Score 5 15     Diagnostic Considerations:    311 (F32.9) Unspecified Depressive Disorder (Per most recent visit with this provider on 10/20/2017)    Plan:    The patient to follow-up with Dr. Brizuela in one week.    Referral submitted to get the patient established with in-home psychotherapy services through [facility].    This provider to follow-up with [facility] for purpose of care coordination.    This provider to follow-up with the patient to continue discussion of [facility].    The patient to utilize safety plan and crisis resources if needed.    Nathaniel Lombardi, Ph.D.  Behavioral Health Fellow

## 2017-11-03 NOTE — PROGRESS NOTES
"  Family History   Problem Relation Age of Onset     DIABETES No family hx of      Coronary Artery Disease No family hx of      Other Cancer No family hx of      Social History     Social History     Marital status:      Spouse name: N/A     Number of children: N/A     Years of education: N/A     Social History Main Topics     Smoking status: Never Smoker     Smokeless tobacco: Never Used     Alcohol use No     Drug use: No     Sexual activity: Not Asked     Other Topics Concern     None     Social History Narrative       There are no exam notes on file for this visit.  Chief Complaint   Patient presents with     RECHECK     Follow up on seizures      RECHECK     Follow up on medications      Blood pressure 107/71, pulse 101, temperature 97.9  F (36.6  C), temperature source Oral, height 5' 1.25\" (155.6 cm), weight 111 lb 9.6 oz (50.6 kg), SpO2 97 %, not currently breastfeeding.    S:  Patient is here for follow up after going to [facility]. Patient reports that she is feeling \"shakey\". Patient reports that she keeps on feeling anxious and continues feels depressed.     Review of the hospital chart shows that she was started on hydroxyzine for anxiety. Pt states it is helping but wants an effective cure. Discussed how the hydrozyzine will help with the symptoms but therapy and zoloft will help with the long term cure. Patient has thoughts of hurting self but no plan to kill self this week.     Start on hydroxyzine, rispiradone and prasozin during hospital visit. Admits to compliance  Has follow up with Neurologist and GI this month  No fevers, chills, headaches, nausea, vomiting, abdominal pain. Admits to spotting since starting medications and unknown if related to new medications. About 1 week. Reports that it normally lasts only a few days.      O:  /71  Pulse 101  Temp 97.9  F (36.6  C) (Oral)  Ht 5' 1.25\" (155.6 cm)  Wt 111 lb 9.6 oz (50.6 kg)  SpO2 97%  BMI 20.91 kg/m2  General: On Chair, no " acute distress  HEENT: No conjunctivitis, EOM grossly intact, oral mucosa pink and moist, no cervical adenopathy palpated. Thyroid midline with no lesions or swelling  Heart: RRR, no murmurs, rubs, or clicks  Lungs: CTA all fields, no wheeze, no crackles, no respiratory distress  Abd: Soft, non tender, not distended, no guarding, no rebound, normoactive bowel sounds,   Extremites: No edema, no lesions noted, pulses present bilaterally and equal, sensation intact upper and lower extremites, strength 5/5 upper and lower extremiteis  Skin: No lesions, no edema, excorations, or rashes noted  Psych: Won't make eye contact, but speaks in complete sentences, not teary    A/P:  1. Depression, unspecified depression type  - Consider in home therapy, [facility]. Dr. Lombardii discussed with patient plans during this visit. Continued thoughts of hurting self but no thoughts harm. Will continue with medications prescribed during hospitalization. Patient has concern because thinks spotting from medications given to hospital. Will continue at this time. But reassess in 1 week, check on UPT at that time. No abd pain, cramping or other red flags. Unknown if spotting caused by medication but will reassess in 1 week, if spotting still present, consider bHcG, CBC,  - f/u with me in 1 wk, Pregnancy test and CBC in 1 wee  - Can consider increasing medication or switching  - In hospitalization, prescribed Risparidone, and continued on zoloft  - Considering complexity can consider Psychiatry referral for 1 time medical management if not improving    2. Seizures (H)  F/U with Neuro this month. Taking meds for prenatal  - Prenatal Vit-Fe Fumarate-FA (PRENATAL MULTIVITAMIN PLUS IRON) 27-0.8 MG TABS per tablet; Take 1 tablet by mouth daily  Dispense: 100 tablet; Refill: 3    3. Anxiety disorder, unspecified type  Started on hydroxyzine in hospital PRN. Improved but wants cure. Consider in home services, [facility]. Dr. Lombardii discussed  with patient plans during this visit  - f/u with me in 1 wk,   - Continue zoloft and hydroxyzine prn    Elvin Brizuela  Family Medicine Resident PGY3

## 2017-11-03 NOTE — PROGRESS NOTES
Preceptor attestation:  Patient seen and discussed with the resident. Assessment and plan reviewed with resident and agreed upon.  Supervising physician: Krista Sun  [facility]

## 2017-11-03 NOTE — PATIENT INSTRUCTIONS
-Dr. Lombardi will coordinate getting me established with in-home therapy services.    -Dr. Lombardi will follow-up with me about scheduling a time to come in to discuss the group.    -Follow-up with Dr. Brizuela in one week.     Safety Plan  Step 1: Warning Signs (thoughts, images, mood, situations, behaviors) that a crisis may be developing  1. Experiencing a feeling of fading away.  2. Feeling more forgetful.  3. Feeling more isolated and alone.  4. Thinking about the afterlife.   5. Feeling bad about myself and crying.     Step 2: Internal coping strategies - Things I can do to take my mind off my problems without contacting another person (relaxation technique, physical activity)  1. Listen to music.  2. Go for a walk.    Step 3: People whom I can ask for help  Name: [facility]   Phone: [phone]  Name:  ([place]) Phone: [phone]      Step 4: Professionals or agencies I can contract during a crisis  - [facility]: [phone] (crisis counseling)  - [facility] Suicide Hotline: 124.563.1122 (suicidal thoughts)  - [facility]: [phone] (psychiatric crisis, but can transport self)  - [facility]: [phone] (psychiatric crisis, but cannot transport self)  - [facility] Adult Crisis Line: [phone] (crisis counseling and walk-in urgent care mental health)  - [facility] Multilingual Crisis Line: [phone]  -Suicide Prevention Lifeline Phone: 8-827-050-TALK (8295)  -If in immediate danger of harming self/others, call 9-1-1.    Step 5: Making the environment safe   1. Keep ropes/cords from the residence.    The things that are most important to me and worth living for are:  -My family    Ignacio G & Marvin BOND (2008). Suicide Safety Plan Template. Publisher: [employer] Mental Health Program and Suicide Prevention Resource Center

## 2017-11-14 ENCOUNTER — TELEPHONE (OUTPATIENT)
Dept: FAMILY MEDICINE | Facility: CLINIC | Age: 27
End: 2017-11-14

## 2017-11-14 NOTE — TELEPHONE ENCOUNTER
----- Message from Elvin Brizuela DO sent at 11/9/2017 10:36 AM CST -----  Tried calling patient but phone gets picked up then dropped.    Please have  call patient to see how the patient is doing and to see if she wants to schedule an appointment with me this week just to make sure she is doing okay. She has an appointment with Dr. Lombardi on 12/1/17 but it might be good for her to see me prior to that,    Elvin Brizuela  Family Medicine Resident PGY3

## 2017-11-14 NOTE — TELEPHONE ENCOUNTER
Call interpretor and left a message to have him call the clinic so we can have him call the patient to check on how she is doing.

## 2017-11-22 ENCOUNTER — TRANSFERRED RECORDS (OUTPATIENT)
Dept: HEALTH INFORMATION MANAGEMENT | Facility: CLINIC | Age: 27
End: 2017-11-22

## 2017-11-28 ENCOUNTER — DOCUMENTATION ONLY (OUTPATIENT)
Dept: PSYCHOLOGY | Facility: CLINIC | Age: 27
End: 2017-11-28

## 2017-11-28 NOTE — PROGRESS NOTES
Shy from [facility] called today to touch base with us about the referral for Paw.   She states the patient was out of town and they were not able to schedule anything at that time.   She did do a diagnostic assessment with the patient at her home, however, and found that she has a lot of needs.  They will be scheduling her with a therapist this week.  Ariela  11/28/17

## 2017-11-28 NOTE — PROGRESS NOTES
Thank you for letting me know. I will plan to reach out to Shy tomorrow to coordinate care, and to follow-up with the patient as well.     Nathaniel Lombardi, Ph.D.  Behavioral Health Fellow

## 2017-11-29 ENCOUNTER — TELEPHONE (OUTPATIENT)
Dept: PSYCHOLOGY | Facility: CLINIC | Age: 27
End: 2017-11-29

## 2017-11-29 NOTE — TELEPHONE ENCOUNTER
This provider placed outreach call to Shy (Phone #[phone]) of [facility] (OSCAR previously obtained) to coordinate care for this patient. Left voicemail message requesting callback.     Nathaniel Lombardi, Ph.D.  Behavioral Health Fellow

## 2017-11-30 ENCOUNTER — TELEPHONE (OUTPATIENT)
Dept: PSYCHOLOGY | Facility: CLINIC | Age: 27
End: 2017-11-30

## 2017-11-30 NOTE — TELEPHONE ENCOUNTER
This provider placed additional outreach call to Shy (Phone #[phone]) of [facility] (OSCAR previously obtained) to coordinate care for this patient. Left voicemail message requesting callback.     Nathaniel Lombardi, Ph.D.  Behavioral Health Fellow

## 2017-12-01 ENCOUNTER — TELEPHONE (OUTPATIENT)
Dept: FAMILY MEDICINE | Facility: CLINIC | Age: 27
End: 2017-12-01

## 2017-12-01 ENCOUNTER — OFFICE VISIT (OUTPATIENT)
Dept: PSYCHOLOGY | Facility: CLINIC | Age: 27
End: 2017-12-01

## 2017-12-01 ENCOUNTER — DOCUMENTATION ONLY (OUTPATIENT)
Dept: PSYCHOLOGY | Facility: CLINIC | Age: 27
End: 2017-12-01

## 2017-12-01 DIAGNOSIS — F41.9 ANXIETY: ICD-10-CM

## 2017-12-01 DIAGNOSIS — F32.A DEPRESSION, UNSPECIFIED DEPRESSION TYPE: Primary | ICD-10-CM
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
## 2017-12-01 NOTE — MR AVS SNAPSHOT
After Visit Summary   12/1/2017    Norman Watts    MRN: [id number]           Patient Information     Date Of Birth          1990        Visit Information        Provider Department      12/1/2017 8:30 AM Lombardi, Nathaniel, PhD [facility]        Care Instructions    -Schedule an appointment with Dr. Brizuela for next week before leaving the clinic.    -Schedule a follow-up visit with Dr. Lombardi (12/15/2017) at 8:30AM.    -Use safety plan and crisis resources if needed.    Safety Plan  Step 1: Warning Signs (thoughts, images, mood, situations, behaviors) that a crisis may be developing  1. Experiencing a feeling of fading away.  2. Feeling more forgetful.  3. Feeling more isolated and alone.  4. Thinking about the afterlife.   5. Feeling bad about myself and crying.     Step 2: Internal coping strategies - Things I can do to take my mind off my problems without contacting another person (relaxation technique, physical activity)  1. Listen to music.  2. Go for a walk.    Step 3: People whom I can ask for help  Name: [facility]   Phone: [phone]  Name:  (Twin) Phone: [phone]   Name: Naw Ler (Cousin) Phone: [phone]     Step 4: Professionals or agencies I can contract during a crisis  - [facility]: [phone] (crisis counseling)  - [facility] Suicide Hotline: [phone] (suicidal thoughts)  - [facility]: [phone] (psychiatric crisis, but can transport self)  - [facility]: [phone] (psychiatric crisis, but cannot transport self)  - [facility] Adult Crisis Line: [phone] (crisis counseling and walk-in urgent care mental health)  - [facility] Multilingual Crisis Line: [phone]  -Suicide Prevention Lifeline Phone: 7-199-006-TALK (1827)  -If in immediate danger of harming self/others, call 9-1-1.    Step 5: Making the environment safe   1. Keep ropes/cords from the residence.    The things that are most important to me and worth living for are:  -My  family    Ignacio G & Marvin BOND (2008). Suicide Safety Plan Template. Publisher: [employer] ([facility] [employer]) Mental Health Program and Suicide Prevention Resource Center          Follow-ups after your visit        Who to contact     Please call your clinic at [phone] to:    Ask questions about your health    Make or cancel appointments    Discuss your medicines    Learn about your test results    Speak to your doctor   If you have compliments or concerns about an experience at your clinic, or if you wish to file a complaint, please contact [facility] Patient Relations at [phone] or email us at [email or web address]         Additional Information About Your Visit        [email or web address] Information     [employer] is an electronic gateway that provides easy, online access to your medical records. With [facility], you can request a clinic appointment, read your test results, renew a prescription or communicate with your care team.     To sign up for [employer] visit the website at [email or web address]   You will be asked to enter the access code listed below, as well as some personal information. Please follow the directions to create your username and password.     Your access code is: [email or web address]  Expires: 2018  9:45 AM     Your access code will  in 90 days. If you need help or a new code, please contact your [facility] or call [phone] for assistance.        Care EveryWhere ID     This is your Care EveryWhere ID. This could be used by other organizations to access your [place] medical records  [id number]         Blood Pressure from Last 3 Encounters:   17 107/71   10/23/17 103/65   10/03/17 113/68    Weight from Last 3 Encounters:   17 111 lb 9.6 oz (50.6 kg)   10/23/17 111 lb (50.3 kg)   10/03/17 108 lb 12.8 oz (49.4 kg)              Today, you had the following     No orders found for  display       Primary Care Provider Office Phone # Fax #    Elvin Brizuela [phone]       [address] [address]        Equal Access to Services     FRITZ EPSARZA: Juan Manuel Childers, warandyda lujen, qaybta kamarvada jass, summer rigoin hayaamikayla ramírezdivinejd esparza. So [facility] [phone].    ATENCIÓN: Si habla español, tiene a yarbrough disposición servicios gratuitos de asistencia lingüística. Llame al [phone].    We comply with applicable federal civil rights laws and [place] laws. We do not discriminate on the basis of race, color, national origin, age, disability, sex, sexual orientation, or gender identity.            Thank you!     Thank you for choosing [facility]  for your care. Our goal is always to provide you with excellent care. Hearing back from our patients is one way we can continue to improve our services. Please take a few minutes to complete the written survey that you may receive in the mail after your visit with us. Thank you!             Your Updated Medication List - Protect others around you: Learn how to safely use, store and throw away your medicines at www.disposemymeds.org.          This list is accurate as of: 12/1/17  9:29 AM.  Always use your most recent med list.                   Brand Name Dispense Instructions for use Diagnosis    cholecalciferol 59728 UNITS capsule    VITAMIN D3    8 capsule    Take 1 capsule (50,000 Units) by mouth once a week    Low vitamin D level       folic acid 400 MCG tablet    FOLVITE    90 tablet    Take 2 tablets (800 mcg) by mouth daily    Seizures (H)       HYDROXYZINE HCL PO      Take 50 mg by mouth        levETIRAcetam 500 MG tablet    KEPPRA    60 tablet    Take 1 tablet (500 mg) by mouth 2 times daily    Seizures (H)       PRAZOSIN HCL PO      Take 1 mg by mouth        prenatal multivitamin plus iron 27-0.8 MG Tabs per tablet     100 tablet    Take 1 tablet by mouth daily     Seizures (H)       sertraline 100 MG tablet    ZOLOFT    30 tablet    Take 1 tablet (100 mg) by mouth daily    Anxiety disorder, unspecified type

## 2017-12-01 NOTE — PROGRESS NOTES
Speaking with Dr. Lombardi today in regards to [facility] services:    He has called and left a couple of messages to Shy with [facility]. We would like to find out if Paw would be able to get connected with their group services on top of her therapy she is getting with them. We would also like to know a little more about group, ages of folks in it and things they do.     If she is not able to get connected with [facility] group services we can provide that for her at [facility]. She is scheduled with Dr. Lombardi on 12/15/17 to complete a treatment plan, then to follow with group that day in clinic.     Dr. Lombardi would even be open to having her do group at both places because he thinks it will be very helpful for her. We will need to check to see if insurance would be ok with this though.    When Shy calls back we will update this note.   Ariela  12/01/17

## 2017-12-01 NOTE — Clinical Note
Hi Dr. Brizuela,  This patient is scheduled to meet with you tomorrow afternoon. When you meet with her, would you please check-in with her about potential access to guns? I was unable to inquire about this during my most recent visit with her as part of my risk/safety assessment and planning. Thank you.  Lorena

## 2017-12-01 NOTE — TELEPHONE ENCOUNTER
Received callback from Shy (Phone #[phone]) of [facility] (OSCAR previously obtained). Informed this provider that she completed a diagnostic assessment with the patient as part of their enrollment process, and subsequently set the patient up with an in-home therapist (Vanessa Milian) for weekly in-home therapy visits. Additionally, informed this provider that she was working to get the patient established with [facility] for additional support. Reported she provided the patient's in-home therapist with this provider's name and contact information (This provider to obtain in-home therapist's contact information moving forward), and reported she would fax the recently completed diagnostic assessment to [facility], for the purpose of care coordination. Of note, informed Shy that the patient expressed continued interest in participating in the [facility] at [facility] (Diagnostic Assessment completed during visit with this provider earlier today as part of the enrollment process), and that this provider would continue working to get the patient established with the [facility] moving forward. Expressed support at the prospect of getting the patient established with the [facility].     Nathaniel Lombardi, Ph.D.  Behavioral Health Fellow

## 2017-12-01 NOTE — PATIENT INSTRUCTIONS
-Schedule an appointment with Dr. Brizuela for next week before leaving the clinic.    -Schedule a follow-up visit with Dr. Lombardi (12/15/2017) at 8:30AM.    -Use safety plan and crisis resources if needed.    Safety Plan  Step 1: Warning Signs (thoughts, images, mood, situations, behaviors) that a crisis may be developing  1. Experiencing a feeling of fading away.  2. Feeling more forgetful.  3. Feeling more isolated and alone.  4. Thinking about the afterlife.   5. Feeling bad about myself and crying.     Step 2: Internal coping strategies - Things I can do to take my mind off my problems without contacting another person (relaxation technique, physical activity)  1. Listen to music.  2. Go for a walk.    Step 3: People whom I can ask for help  Name: [facility]   Phone: [phone]  Name:  (Twin) Phone: [phone]   Name: Zafar Bey (Cousin) Phone: [phone]     Step 4: Professionals or agencies I can contract during a crisis  - Crisis Connection: [phone] (crisis counseling)  - [facility] Suicide Hotline: 457.723.1103 (suicidal thoughts)  - [facility]: [phone] (psychiatric crisis, but can transport self)  - [facility]: [phone] (psychiatric crisis, but cannot transport self)  - [facility] Adult Crisis Line: [phone] (crisis counseling and walk-in urgent care mental health)  - [facility] Multilingual Crisis Line: [phone]  -Suicide Prevention Lifeline Phone: 8-634-181-TALK (7430)  -If in immediate danger of harming self/others, call 9-1-1.    Step 5: Making the environment safe   1. Keep ropes/cords from the residence.    The things that are most important to me and worth living for are:  -My family    Ignacio HILLS & Marvin BOND (2008). Suicide Safety Plan Template. Publisher: [employer] Mental Health Program and Suicide Prevention Resource Center

## 2017-12-04 ENCOUNTER — TRANSFERRED RECORDS (OUTPATIENT)
Dept: HEALTH INFORMATION MANAGEMENT | Facility: CLINIC | Age: 27
End: 2017-12-04

## 2017-12-05 PROBLEM — F41.9 ANXIETY: Status: ACTIVE | Noted: 2017-12-05

## 2017-12-06 ENCOUNTER — OFFICE VISIT (OUTPATIENT)
Dept: FAMILY MEDICINE | Facility: CLINIC | Age: 27
End: 2017-12-06

## 2017-12-06 VITALS — TEMPERATURE: 98.3 F | DIASTOLIC BLOOD PRESSURE: 66 MMHG | SYSTOLIC BLOOD PRESSURE: 99 MMHG | HEART RATE: 68 BPM

## 2017-12-06 DIAGNOSIS — F41.9 ANXIETY DISORDER, UNSPECIFIED TYPE: ICD-10-CM

## 2017-12-06 DIAGNOSIS — F32.A DEPRESSION, UNSPECIFIED DEPRESSION TYPE: Primary | ICD-10-CM

## 2017-12-06 DIAGNOSIS — Z60.9 SOCIAL PROBLEM: ICD-10-CM

## 2017-12-06 RX ORDER — RISPERIDONE 1 MG/1
1 TABLET, ORALLY DISINTEGRATING ORAL DAILY
Qty: 30 TABLET | Refills: 0 | Status: SHIPPED | OUTPATIENT
Start: 2017-12-06 | End: 2018-01-15

## 2017-12-06 RX ORDER — SERTRALINE HYDROCHLORIDE 100 MG/1
200 TABLET, FILM COATED ORAL DAILY
Qty: 30 TABLET | Refills: 1 | Status: SHIPPED | OUTPATIENT
Start: 2017-12-06 | End: 2018-01-15

## 2017-12-06 RX ORDER — RISPERIDONE 1 MG/1
1 TABLET, ORALLY DISINTEGRATING ORAL
COMMUNITY
Start: 2017-11-02 | End: 2017-12-06

## 2017-12-06 RX ORDER — DIVALPROEX SODIUM 500 MG/1
500 TABLET, DELAYED RELEASE ORAL AT BEDTIME
COMMUNITY
End: 2018-01-15

## 2017-12-06 SDOH — SOCIAL STABILITY - SOCIAL INSECURITY: PROBLEM RELATED TO SOCIAL ENVIRONMENT, UNSPECIFIED: Z60.9

## 2017-12-06 NOTE — MR AVS SNAPSHOT
After Visit Summary   12/6/2017    Norman Watts    MRN: [id number]           Patient Information     Date Of Birth          1990        Visit Information        Provider Department      12/6/2017 4:30 PM Elvin Brizuela DO [facility]        Today's Diagnoses     Depression, unspecified depression type    -  1    Anxiety disorder, unspecified type          Care Instructions    - I refilled your respiradone  - Someone will call you for psychiatry referral          Follow-ups after your visit        Additional Services     MENTAL HEALTH REFERRAL  - Adult; Psychiatry and Medication Management       Use this form for behavioral health consults and assessments. The referral coordinator will help to determine whether patients are best served by clinic behavioral health staff or by community providers.    Presenting Problem: depressed mood, anxiety, poor concentration    Currently having suicidal thoughts: Yes  Previous psych hospitalization: Yes    Type of referral(s) requested (indicate all that apply):  Adult Psychiatry--for long term medication management., needing a higher level of care than primary care can provide        needed:Yes  Language: Jennifer                  Your next 10 appointments already scheduled     Dec 15, 2017  8:30 AM CST   Return Visit with Nathaniel Lombardi, PhD   [facility] ([facility])    [address] [place] [address]   [phone]              Who to contact     Please call your clinic at [phone] to:    Ask questions about your health    Make or cancel appointments    Discuss your medicines    Learn about your test results    Speak to your doctor   If you have compliments or concerns about an experience at your clinic, or if you wish to file a complaint, please contact [facility] Patient Relations at [phone] or email us at [email or web address]         Additional Information About Your Visit         Whirlpool Information     Whirlpool is an electronic gateway that provides easy, online access to your medical records. With Whirlpool, you can request a clinic appointment, read your test results, renew a prescription or communicate with your care team.     To sign up for Whirlpool visit the website at [email or web address]   You will be asked to enter the access code listed below, as well as some personal information. Please follow the directions to create your username and password.     Your access code is: [email or web address]  Expires: 2018  9:45 AM     Your access code will  in 90 days. If you need help or a new code, please contact your [facility] or call [phone] for assistance.        Care EveryWhere ID     This is your Care EveryWhere ID. This could be used by other organizations to access your [facility] medical records  [id number]        Your Vitals Were     Pulse Temperature                68 98.3  F (36.8  C)           Blood Pressure from Last 3 Encounters:   17 99/66   17 107/71   10/23/17 103/65    Weight from Last 3 Encounters:   17 111 lb 9.6 oz (50.6 kg)   10/23/17 111 lb (50.3 kg)   10/03/17 108 lb 12.8 oz (49.4 kg)              We Performed the Following     MENTAL HEALTH REFERRAL  - Adult; Psychiatry and Medication Management          Today's Medication Changes          These changes are accurate as of: 17  5:28 PM.  If you have any questions, ask your nurse or doctor.               These medicines have changed or have updated prescriptions.        Dose/Directions    risperiDONE 1 MG ODT tab   Commonly known as:  risperDAL M-TABS   This may have changed:  when to take this   Used for:  Anxiety disorder, unspecified type, Depression, unspecified depression type        Dose:  1 mg   Take 1 tablet (1 mg) by mouth daily   Quantity:  30 tablet   Refills:  0       sertraline 100 MG tablet   Commonly known as:  ZOLOFT   This may have changed:   how much to take   Used for:  Anxiety disorder, unspecified type        Dose:  200 mg   Take 2 tablets (200 mg) by mouth daily   Quantity:  30 tablet   Refills:  1         Stop taking these medicines if you haven't already. Please contact your care team if you have questions.     levETIRAcetam 500 MG tablet   Commonly known as:  KEPPRA                Where to get your medicines      These medications were sent to [facility] - [place], [place] - [address], [place] [address]     Phone:  [phone]     risperiDONE 1 MG ODT tab    sertraline 100 MG tablet                Primary Care Provider Office Phone # Fax #    Elvin Brizuela [phone]       [address] [address]        Equal Access to Services     FRITZ FLORES : Juan Manuel walkero Soaram, waaxda luqadaha, qaybta kaalmada adeegyada, summer esparza. So [facility] [phone].    ATENCIÓN: Si habla español, tiene a yarbrough disposición servicios gratuitos de asistencia lingüística. Llame al [phone].    We comply with applicable federal civil rights laws and [place] laws. We do not discriminate on the basis of race, color, national origin, age, disability, sex, sexual orientation, or gender identity.            Thank you!     Thank you for choosing [facility]  for your care. Our goal is always to provide you with excellent care. Hearing back from our patients is one way we can continue to improve our services. Please take a few minutes to complete the written survey that you may receive in the mail after your visit with us. Thank you!             Your Updated Medication List - Protect others around you: Learn how to safely use, store and throw away your medicines at www.disposemymeds.org.          This list is accurate as of: 12/6/17  5:28 PM.  Always use your most recent med list.                   Brand Name Dispense Instructions for use  Diagnosis    cholecalciferol 19592 UNITS capsule    VITAMIN D3    8 capsule    Take 1 capsule (50,000 Units) by mouth once a week    Low vitamin D level       divalproex 500 MG EC tablet    DEPAKOTE     Take by mouth 2 times daily        folic acid 400 MCG tablet    FOLVITE    90 tablet    Take 2 tablets (800 mcg) by mouth daily    Seizures (H)       HYDROXYZINE HCL PO      Take 50 mg by mouth        PRAZOSIN HCL PO      Take 1 mg by mouth        prenatal multivitamin plus iron 27-0.8 MG Tabs per tablet     100 tablet    Take 1 tablet by mouth daily    Seizures (H)       risperiDONE 1 MG ODT tab    risperDAL M-TABS    30 tablet    Take 1 tablet (1 mg) by mouth daily    Anxiety disorder, unspecified type, Depression, unspecified depression type       sertraline 100 MG tablet    ZOLOFT    30 tablet    Take 2 tablets (200 mg) by mouth daily    Anxiety disorder, unspecified type

## 2017-12-06 NOTE — PROGRESS NOTES
Preceptor attestation:  Vital signs reviewed: BP 99/66  Pulse 68  Temp 98.3  F (36.8  C)    Patient seen and discussed with the resident. Assessment and plan reviewed with resident and agreed upon.    Supervising physician: Chiquis Lozada MD  [facility]

## 2017-12-06 NOTE — PROGRESS NOTES
"S:  Patient is here for follow up and check up. Patient was recently hospiutalized for suicidal ideation. She is now set up with in home psychotherapy services and getting ARMs worker set up. She is also seeing Dr. Lombardi for councelling here in the clinic and may be a candidate for Jennifer group. Patient's PHQ 9 is 16. Patient reports that currently, when at home, she feels scared and does not want to stay alone. Patient continues to think about suicide, and has thoughts of hanging herself but no plan thoughts of carrying out plans. Patient reports that feeling worse and scared today than when last seen last week. Reports that when she is alone, she \"sees\" things. She is feeling scared from the objects she sees including animals and humans. Animals she sees include tigers and they sometimes try to grab her but disappear before they touch her. The humans she sees are people she doesn't know and they don't try to harm her. She is aware that they are not real but still feel frightened by them. Patient has a neighbour that helps her and sometimes stays with her to help her from being scared. NO plans to comitte suicide this week. Will call us or talk to Dr. Lombardii if any quesitons or plans to comitte suicide. Will also go to hospital.     is asking about getting pt to live with sister to help her not be alone. Sister is another state and unable to come live here. Patient is in process of getting citizenship and unable to leave. Patient and  is requesting a waiver for citizenship. Patient does not have a gun at home or access to gun.     Patient would like to be tested for pregnancy, LMP was 2 days ago. Patient is likely not pregnant at this time and patient understands that very unlikely at this time. Patient would very much like to be pregnancy as well as . They requested I clarify if all medications for her are safe for pregnancy.     Medication was reviewed. Discussed how medication is needed " for safety for mother. More important for patient to continue medication at this time.     O:  BP 99/66  Pulse 68  Temp 98.3  F (36.8  C)  General: On Chair, no acute distress  HEENT: No conjunctivitis, EOM grossly intact, no cervical adenopathy palpated. Thyroid midline and no lesions palpated  Heart: RRR, no murmurs, rubs, or clicks  Lungs: CTA all fields, no wheeze, no crackles, no respiratory distress  Abd: Soft, non tender, not distended, no guarding, no rebound,   Skin: No lesions, no edema, excorations, or rashes noted  Psych/ Neuro: no gross focal neurological deficit. Patient was speaking in full sentences, appear appropriately animated when discussing animals in room. No tremors or agitation noted.    A/P:  1. Anxiety disorder, unspecified type  2. Depression, unspecified depression type  Continued to feel depressed and high anxiety with being alone. Patient also describes aspects of psychosis and concerning for possible schizoaffective. Patient is connected with counseling and in-home therapy but may benefit from further medical management and assessment by Psychiatry.  reports that risperidone prescribed during hospitalization helped greatly with pt's mood but they ran out. Will re prescribe. Will need to monitor risperidone if patient gets pregnant especially at 3rd trimester but other medications, at this time, benefits out weight risk and I discussed this with both patient and .  - sertraline (ZOLOFT) 100 MG tablet; Take 2 tablets (200 mg) by mouth daily  Dispense: 30 tablet; Refill: 1  - MENTAL HEALTH REFERRAL  - Adult; Psychiatry and Medication Management  - risperiDONE (RISPERDAL M-TABS) 1 MG ODT tab; Take 1 tablet (1 mg) by mouth daily  Dispense: 30 tablet; Refill: 0    3. Social problem  Patient requests citizen waiver. Due to patient's history of anxiety, depression, inability to concentrate and learn english from the anxiety and depression, possible psychosis, patient is a  candidate for citizen waiver. As per patient and  ARMs worker is starting application. Reportedly, they were suppose to get a copy, and another copy is suppose to be forwarded to me. They do not have the ARM's worker's number but will obtain it and hand it off to Dr. Aburto. Patient may need to be referred for neuropsych testing for waiver. Will forward this note to Referral and Dr. Aburto to assess what else is needed.    Elvin Brizuela  Family Medicine Resident PGY3

## 2017-12-06 NOTE — PATIENT INSTRUCTIONS
- I refilled your respiradone  - Someone will call you for psychiatry referral  - RTC in 2 weeks      A message has been sent to the behavioral health team to advise for mental health referral. See documentation encounter for details.  Ariela  12/07/17

## 2017-12-07 ENCOUNTER — TELEPHONE (OUTPATIENT)
Dept: FAMILY MEDICINE | Facility: CLINIC | Age: 27
End: 2017-12-07

## 2017-12-07 ENCOUNTER — DOCUMENTATION ONLY (OUTPATIENT)
Dept: PSYCHOLOGY | Facility: CLINIC | Age: 27
End: 2017-12-07

## 2017-12-07 ASSESSMENT — PATIENT HEALTH QUESTIONNAIRE - PHQ9: SUM OF ALL RESPONSES TO PHQ QUESTIONS 1-9: 16

## 2017-12-07 NOTE — PROGRESS NOTES
Behavioral Health Team,    Patient is being referred for mental health services. Please advise if we are able to see patient for in house treatment or if a community option would be best.    Thank you.     Ariela  Care Coordinator

## 2017-12-07 NOTE — PROGRESS NOTES
Review of the patient's medical record, and the referral order, indicate referral is for psychiatric services within the community for long-term medication management. The patient is scheduled to follow-up with this provider on 12/15/2017 to complete the enrollment process for the [facility]. Will plan to provide the referral information listed below during her next-scheduled appointment, and will consult with care coordinator at the clinic to facilitate the referral process. Of note, as the patient is established with [facility] ([facility]CAR previously obtained and current) for in-home psychotherapy services (Also in the process of getting established with [facility]), this provider will reach out to [facility] for the purpose of care coordination before the patient's next-scheduled appointment.     Nathaniel Lombardi, Ph.D.  Behavioral Health Fellow    -----------------------------------------------------------------------------    [facility]  [address] [place] [address] [address]  [place] [address]  [phone]    [facility]  [address] [place] [address]  [address]  [place], [place] [address]  [phone]    [facility] [address]   [facility] [address]  [phone]  [facility] [place] [id number] [facility] [id number]  [place] [address]  [phone]

## 2017-12-07 NOTE — TELEPHONE ENCOUNTER
This provider placed outreach call to Shy (Phone #[phone]) of [facility] (OSCAR previously obtained) to coordinate care for this patient. Left voicemail message requesting callback.    Nathaniel Lombardi, Ph.D.  Behavioral Health Fellow

## 2017-12-13 ENCOUNTER — TELEPHONE (OUTPATIENT)
Dept: PSYCHOLOGY | Facility: CLINIC | Age: 27
End: 2017-12-13

## 2017-12-13 NOTE — TELEPHONE ENCOUNTER
Received callback from Shy (Phone #[phone]) of [facility] ([facility]CAR previously obtained). Informed this provider that an [employer]HS worker within their agency/organization had been assigned to the patient (Carrie Judd: Phone #[phone]). Additionally, provided the contact information for the patient's in-home therapist (Vanessa Milian: Phone #[phone]). Both In-home therapist and [employer]HS worker were added to the patient's care team. Of note, informed Shy of the plan to refer the patient for psychiatric services within the community for long-term medication management during her next-scheduled appointment with this provider. Expressed agreement with this plan, and noted she would inform the patient's ARMHS worker and in-home therapist of the plan as well.     Nathaniel Lombardi, Ph.D.  Behavioral Health Fellow

## 2017-12-15 ENCOUNTER — TELEPHONE (OUTPATIENT)
Dept: PSYCHOLOGY | Facility: CLINIC | Age: 27
End: 2017-12-15

## 2017-12-15 NOTE — TELEPHONE ENCOUNTER
This provider placed outreach call to the patient with the assistance of Hillary Austin (Clinic staff) regarding transportation concerns (i.e., Transportation services not being included as component of the patient's insurance coverage), which prevented the patient from attending appointment that had been scheduled for today 12/15/2017. Talked with the patient. Confirmed the patient has an appointment scheduled with PCP (Dr. Brizuela) for next Wednesday 12/20/2017 at 4:30PM, and agreed this provider would plan to meet with the patient during her appointment to discuss transportation concerns and coordinate care. Of note, confirmed that her  would be providing transportation to her upcoming appointment with PCP. Agreed to contact the clinic with any questions or concerns should they arise prior to her upcoming appointment. Note: consulted with ILENE Mccullough following the current outreach call, who agreed to reach out to the patient's insurance provider to discuss transportation concerns and identify potential options/solutions. Plan to follow-up with ILENE Mccullough before upcoming appointment.     Nathaniel Lombardi, Ph.D.  Behavioral Health Fellow

## 2017-12-20 ENCOUNTER — OFFICE VISIT (OUTPATIENT)
Dept: FAMILY MEDICINE | Facility: CLINIC | Age: 27
End: 2017-12-20
Payer: MEDICAID

## 2017-12-20 VITALS
WEIGHT: 113 LBS | TEMPERATURE: 97.8 F | DIASTOLIC BLOOD PRESSURE: 66 MMHG | BODY MASS INDEX: 21.18 KG/M2 | SYSTOLIC BLOOD PRESSURE: 103 MMHG | HEART RATE: 90 BPM | OXYGEN SATURATION: 97 %

## 2017-12-20 DIAGNOSIS — R56.9 SEIZURES (H): Primary | ICD-10-CM

## 2017-12-20 DIAGNOSIS — F32.A DEPRESSION, UNSPECIFIED DEPRESSION TYPE: ICD-10-CM

## 2017-12-20 RX ORDER — LAMOTRIGINE 25 MG/1
25 TABLET ORAL
Qty: 7 TABLET | Refills: 0 | Status: SHIPPED | OUTPATIENT
Start: 2017-12-20 | End: 2018-01-15

## 2017-12-20 NOTE — PROGRESS NOTES
{  Family History   Problem Relation Age of Onset     DIABETES No family hx of      Coronary Artery Disease No family hx of      Other Cancer No family hx of      Social History     Social History     Marital status:      Spouse name: N/A     Number of children: N/A     Years of education: N/A     Social History Main Topics     Smoking status: Never Smoker     Smokeless tobacco: Never Used     Alcohol use No     Drug use: No     Sexual activity: Not Asked     Other Topics Concern     None     Social History Narrative       Nursing Notes:   Norman November [facility]  12/20/2017  4:43 PM  Signed   name: Erin Hernandez  Language: Jennifer  Agency: [employer]  Phone number: [phone]    Chief Complaint   Patient presents with     RECHECK     come here today for regular follow up with the doctor per patient.      Blood pressure 103/66, pulse 90, temperature 97.8  F (36.6  C), temperature source Oral, weight 113 lb (51.3 kg), last menstrual period 11/24/2017, SpO2 97 %, not currently breastfeeding.      S:  Patient is here to recheck on medication and self. Patient continues to have thoughts of hurting herself but no plans. Has been taking zoloft and depakote as prescribed but not risparidone as only refilled yesterday. Patient continues to hear and see voices and animals/ people in room when left alone. Patient reports she understands she should wait until medical issues are cleared before having baby but still desires one and not using contraception.     She continues to have night rose.  also states that they may run out of insurance in 1 month.    O:  /66  Pulse 90  Temp 97.8  F (36.6  C) (Oral)  Wt 113 lb (51.3 kg)  LMP 11/24/2017 (Approximate)  SpO2 97%  BMI 21.18 kg/m2  General: On Chair,  Hands visibly shaking and patient is trembling  HEENT: No conjunctivitis, EOM grossly intact,   Heart: RRR, no murmurs, rubs, or clicks  Lungs: CTA all fields, no wheeze, no crackles, no respiratory  distress  Extremites: No edema, no lesions noted, pulses present bilaterally and equal,noted shaking when held straight out  Skin: No lesions, no edema, excorations, or rashes noted    A/P:  1. Seizures (H)  Patient understands that she should wait until medical control until pregnancy, she continues to have sex without protection. She has mention several times of wanting to have baby and wanted med rec done to ensure medicine safe for baby. Concern that pt is attempting to get pregnant despite voicing understanding and repeated discussion how medication may not be safest for baby. Depakote will be slowly tapered down (from BID to daily for 2 weeks) and lamictal will be tapered up for the next coming 2 weeks. Dr. Herman Neurologist will be called about plan  - lamoTRIgine (LAMICTAL) 25 MG tablet; Take 1 tablet (25 mg) by mouth every 48 hours for 14 days  Dispense: 7 tablet; Refill: 0  - at next visit, increase lamotrigine to daily and d/c depakote    2. Depression, unspecified depression type  Continued depression with thoughts of suicide but no plan at this time. Discussed changing timing of zolft to nightly as she reports this makes her sleepy and this may enable her to sleep instead of seeing hallucination. Patient will also be meeting with Dr. Lombardi (who was present during this visit) for Jennifer group, as will as [facility] worker, inhome therapy. Concerning that patient's insurance may lapse and may need to discuss with [facility] at next visit if it appears this may happen  - Change Zoloft to night  - [facility] for possible lapse of insurance at next visit    Elvin Brizuela  Family Medicine Resident PGY3

## 2017-12-20 NOTE — PROGRESS NOTES
Preceptor attestation:  Patient seen and discussed with the resident. Assessment and plan reviewed with resident and agreed upon.  Supervising physician: Zhang Carey  [facility]

## 2017-12-20 NOTE — Clinical Note
Az Titus,  Wanted to route this consult note to you as Dr. Brizuela is hoping to get this patient established with psychiatric services. I will plan to stop by tomorrow morning and chat about it with you if you will be around.  Emmanuel, johnson

## 2017-12-20 NOTE — PATIENT INSTRUCTIONS
-Will plan to get appointment scheduled at one of these places: (Monday-Friday; Before 10:00AM or at 4:30PM)  [facility]  [address]   [place], [place] [address]  [phone]    [facility]  [address] [place] [address]  [phone]    Safety Plan  Step 1: Warning Signs (thoughts, images, mood, situations, behaviors) that a crisis may be developing  1. Experiencing a feeling of fading away.  2. Feeling more forgetful.  3. Feeling more isolated and alone.  4. Thinking about the afterlife.   5. Feeling bad about myself and crying.     Step 2: Internal coping strategies - Things I can do to take my mind off my problems without contacting another person (relaxation technique, physical activity)  1. Listen to music.  2. Go for a walk.    Step 3: People whom I can ask for help  Name: [facility]   Phone: [phone]  Name:  ([facility]) Phone: [phone]      Step 4: Professionals or agencies I can contract during a crisis  - Crisis Connection: [phone] (crisis counseling)  - [facility] Suicide Hotline: [phone] (suicidal thoughts)  - [facility]: [phone] (psychiatric crisis, but can transport self)  - [facility]: [phone] (psychiatric crisis, but cannot transport self)  - [facility] Adult Crisis Line: [phone] (crisis counseling and walk-in urgent care mental health)  - [facility] Multilingual Crisis Line: [phone]  -Suicide Prevention Lifeline Phone: 7-767-788-TALK (5130)  -If in immediate danger of harming self/others, call 9-1-1.    Step 5: Making the environment safe   1. Keep ropes/cords from the residence.    The things that are most important to me and worth living for are:  -My family    Ignacio HILLS & Marvin BOND (2008). Suicide Safety Plan Template. Publisher: [facility] ([employer]) Mental Health Program and Suicide Prevention Resource Center    - Take the  Divalproex only at night  - Start taking the new medication every other day (lamotrigine)

## 2017-12-20 NOTE — MR AVS SNAPSHOT
After Visit Summary   12/20/2017    Norman Watts    MRN: [id number]           Patient Information     Date Of Birth          1990        Visit Information        Provider Department      12/20/2017 4:30 PM Elvin Brizuela DO [facility]        Today's Diagnoses     Seizures (H)    -  1      Care Instructions      -Will plan to get appointment scheduled at one of these places: (Monday-Friday; Before 10:00AM or at 4:30PM)  [facility]  [address] [place] [address] [address]   [place], [place] [address]  [phone]    [facility]  [address] [place] [address] [address] [place] [address]  [phone]    Safety Plan  Step 1: Warning Signs (thoughts, images, mood, situations, behaviors) that a crisis may be developing  1. Experiencing a feeling of fading away.  2. Feeling more forgetful.  3. Feeling more isolated and alone.  4. Thinking about the afterlife.   5. Feeling bad about myself and crying.     Step 2: Internal coping strategies - Things I can do to take my mind off my problems without contacting another person (relaxation technique, physical activity)  1. Listen to music.  2. Go for a walk.    Step 3: People whom I can ask for help  Name: [facility]   Phone: [phone]  Name:  ([facility]) Phone: [phone]      Step 4: Professionals or agencies I can contract during a crisis  - Crisis Connection: [phone] (crisis counseling)  - [facility] Suicide Hotline: [phone] (suicidal thoughts)  - [facility]: [phone] (psychiatric crisis, but can transport self)  - [facility]: [phone] (psychiatric crisis, but cannot transport self)  - [facility] Adult Crisis Line: [phone] (crisis counseling and walk-in urgent care mental health)  - [facility] Multilingual Crisis Line: [phone]  -Suicide Prevention Lifeline Phone: 2-475-489-TALK (4776)  -If in immediate danger of harming self/others, call 9-1-1.    Step 5: Making the environment  safe   1. Keep ropes/cords from the residence.    The things that are most important to me and worth living for are:  -My family    Ignacio G & Marvin BOND (2008). Suicide Safety Plan Template. Publisher: [facility] ([employer]) Mental Health Program and Suicide Prevention Resource Center    - Take the Divalproex only at night  - Start taking the new medication every other day (lamotrigine)          Follow-ups after your visit        Your next 10 appointments already scheduled     Onesimo 10, 2018  9:30 AM CST   Return Visit with Nathaniel Lombardi, PhD   [facility]    [address] [place] [address]   [phone]              Who to contact     Please call your clinic at [phone] to:    Ask questions about your health    Make or cancel appointments    Discuss your medicines    Learn about your test results    Speak to your doctor   If you have compliments or concerns about an experience at your clinic, or if you wish to file a complaint, please contact [facility] Patient Relations at [phone] or email us at [email or web address]         Additional Information About Your Visit        BetterDoctorhart Information     Tilt is an electronic gateway that provides easy, online access to your medical records. With Tilt, you can request a clinic appointment, read your test results, renew a prescription or communicate with your care team.     To sign up for Ocot visit the website at [email or web address]   You will be asked to enter the access code listed below, as well as some personal information. Please follow the directions to create your username and password.     Your access code is: [email or web address]  Expires: 2018  9:45 AM     Your access code will  in 90 days. If you need help or a new code, please contact your [facility] or call [phone] for assistance.        Care  EveryWhere ID     This is your Care EveryWhere ID. This could be used by other organizations to access your [facility] medical records  [id number]        Your Vitals Were     Pulse Temperature Last Period Pulse Oximetry BMI (Body Mass Index)       90 97.8  F (36.6  C) (Oral) 11/24/2017 (Approximate) 97% 21.18 kg/m2        Blood Pressure from Last 3 Encounters:   12/20/17 103/66   12/06/17 99/66   11/03/17 107/71    Weight from Last 3 Encounters:   12/20/17 113 lb (51.3 kg)   11/03/17 111 lb 9.6 oz (50.6 kg)   10/23/17 111 lb (50.3 kg)              Today, you had the following     No orders found for display         Today's Medication Changes          These changes are accurate as of: 12/20/17  5:28 PM.  If you have any questions, ask your nurse or doctor.               Start taking these medicines.        Dose/Directions    lamoTRIgine 25 MG tablet   Commonly known as:  LaMICtal   Used for:  Seizures (H)   Started by:  Elvin Brizuela DO        Dose:  25 mg   Take 1 tablet (25 mg) by mouth every 48 hours for 14 days   Quantity:  7 tablet   Refills:  0            Where to get your medicines      These medications were sent to [facility] [place], [place] - [address], [place] [address]     Phone:  [phone]     lamoTRIgine 25 MG tablet                Primary Care Provider Office Phone # Fax #    Elvin Brizuela [phone]       [address] [address]        Equal Access to Services     FRITZ FLORES AH: Juan Manuel parr Soaram, warandyda luqadaha, qaybta kaalmasummer porter. So [facility] [phone].    ATENCIÓN: Si habla español, tiene a yarbrough disposición servicios gratuitos de asistencia lingüística. Llame al [phone].    We comply with applicable federal civil rights laws and [place] laws. We do not discriminate on the basis of race, color, national origin, age, disability, sex, sexual  orientation, or gender identity.            Thank you!     Thank you for choosing [facility]  for your care. Our goal is always to provide you with excellent care. Hearing back from our patients is one way we can continue to improve our services. Please take a few minutes to complete the written survey that you may receive in the mail after your visit with us. Thank you!             Your Updated Medication List - Protect others around you: Learn how to safely use, store and throw away your medicines at www.disposemymeds.org.          This list is accurate as of: 12/20/17  5:28 PM.  Always use your most recent med list.                   Brand Name Dispense Instructions for use Diagnosis    cholecalciferol 12223 UNITS capsule    VITAMIN D3    8 capsule    Take 1 capsule (50,000 Units) by mouth once a week    Low vitamin D level       divalproex 500 MG EC tablet    DEPAKOTE     Take 500 mg by mouth At Bedtime        folic acid 400 MCG tablet    FOLVITE    90 tablet    Take 2 tablets (800 mcg) by mouth daily    Seizures (H)       HYDROXYZINE HCL PO      Take 50 mg by mouth        lamoTRIgine 25 MG tablet    LaMICtal    7 tablet    Take 1 tablet (25 mg) by mouth every 48 hours for 14 days    Seizures (H)       PRAZOSIN HCL PO      Take 1 mg by mouth        prenatal multivitamin plus iron 27-0.8 MG Tabs per tablet     100 tablet    Take 1 tablet by mouth daily    Seizures (H)       risperiDONE 1 MG ODT tab    risperDAL M-TABS    30 tablet    Take 1 tablet (1 mg) by mouth daily    Anxiety disorder, unspecified type, Depression, unspecified depression type       sertraline 100 MG tablet    ZOLOFT    30 tablet    Take 2 tablets (200 mg) by mouth daily    Anxiety disorder, unspecified type

## 2017-12-20 NOTE — NURSING NOTE
name: Erin Hernandez  Language: Jennifer  Agency: [facility]  Phone number: [phone]

## 2017-12-21 NOTE — PROGRESS NOTES
Primary Care Behavioral Health Consult Note  Meeting lasted: 20 minutes  Others present: Patient's ;  (KTNICOLE); Dr. Brizuela (Present for portion of consultation)    Identifying Information and Presenting Problem:  Dr. Brizuela requested behavioral health consultation for this patient regarding coordination of care. The patient is a 27 year old Jennifer individual previously seen by this provider for mental health assessment and potential enrollment in the [facility]. The patient agreed to be seen by behavioral health today.     Topics Discussed/Interventions Provided:     Brief risk/safety assessment and planning. The patient endorsed continued experiences of both passive suicidal ideation and active suicidal ideation with plan [Hanging; Cutting wrists with knife] though without intent over the past few weeks. She denied current suicidal ideation or concerns regarding safety at this time. The patient has notable risk factors for self-harm, including anxiety, depression, some symptoms of psychosis (Please refer to PCP's documentation), and comorbid medical condition of seizures. However, risk is mitigated by commitment to family, sobriety, absence of past attempts, ability to volunteer a safety plan, and history of seeking help when needed. Additionally, the patient is currently established with both in-home psychotherapy services and Adult Rehabilitative Mental Health Services through [facility] and has been meeting with them consistently. Therefore, based on all available evidence including the factors cited above, the patient was not found to be at imminent risk for self-harm, did not meet criteria for a 72-hr hold, and therefore remained appropriate for ongoing outpatient level of care. Provided the patient with additional copy of safety plan and crisis resources (See AVS), which were reviewed during the current consultation. Expressed confidence in her ability to utilize the safety  plan and/or crisis resources if needed.     Discussed transportation concerns. Followed-up on telephone outreach from last week regarding transportation concerns (See telephone documentation from 12/15/2017). Confirmed that the patient's , who provides transportation to appointments, would be unable to bring the patient to the clinic on Friday mornings for the [facility] (During the timeframe of the group meetings) due to work obligations and that alternate transportation would be required. Shared with the patient and her  that this provider consulted with care coordinator at the clinic (ILENE Mccullough), who informed this provider that transportation services were not included as a component of the patient's insurance coverage. Informed the patient and her  that this provider would continue working with ILENE Mccullough regarding potential options/solutions that would make it feasible for the patient to participate in the [facility], and that this provider would follow-up with them via outreach call moving forward. Expressed understanding and appreciation.      Coordinated care regarding referral for psychiatric services. Discussed PCP's recent referral for the patient to get established with psychiatric services within the community for long-term medication management. Expressed understanding and receptivity to this plan. Identified two potential agencies/organizations within the community ([facility], [facility] [employer]), and discussed options for getting the patient established (e.g., Having the patient and her  contact the agencies/organizations directly, Having referral coordinator contact the agencies/organizations). Expressed interest in having referral coordinator facilitate referral process and scheduling (Note: Completed [employer]AR forms for both agencies/organizations). Discussed dates/times that would be feasible with the patient's  's work schedule. The patient's  reported appointments earlier in the morning (Prior to 10:00AM) or later in the afternoon (4:30PM) Monday through Friday would be feasible with his work schedule. Agreed to convey this information to referral coordinator.     Assessment:   Mental Status: Paw appeared generally alert and oriented. Dress was casual and appropriate to the weather and occasion. Grooming and hygiene were good. Eye contact was limited. Speech was of normal volume and rate and was clear, coherent, and relevant. Mood was anxious with congruent affect. Thought processes were relevant, logical and goal-directed. Thought content was WNL with no evidence of paranoid features. Informed by PCP that she endorsed some symptoms of psychosis, though this was not observed or discussed during current consultation. See above for information above pertaining to suicidal ideation/self-harm. No evidence of homicidal ideation, intent, or plans. Memory appeared grossly intact though was not formally assessed. Insight and judgment appeared fair and patient exhibited good impulse control during the consultation.     PHQ-9 SCORE 10/20/2017 12/7/2017   Total Score 18 16     RY-7 SCORE 10/20/2017 10/23/2017   Total Score 5 15     Diagnostic Considerations (Per most recent visit with this provider on 12/1/2017):    311      Unspecified Depressive Disorder (F32.9)   300.00  Unspecified Anxiety Disorder (F41.9)   R/O      Major Depressive Disorder (Recurrent Episode) versus Depressive Disorder Due to Another Medical Condition (Seizures)    Plan:     Consulted with Dr. Brizuela following current consultation for purpose of care coordination.    The patient to follow-up with Dr. Brizuela in two weeks.    The patient to continue working with in-home therapist and [facility] worker.     This provider to coordinate with referral coordinator to assist with getting the patient established with psychiatric services.     This provider  to follow-up with the patient after further exploring options/solutions to try to address transportation concerns.     The patient to utilize safety plan and crisis resources if needed.      Nathaniel Lombardi, Ph.D.  Behavioral Health Fellow

## 2017-12-21 NOTE — TELEPHONE ENCOUNTER
SW reached out to patient's health insurance with [employer]. She does not have eligibility for rides due to her plan being a [place] Care Plan. [employer] Rep the only way to change to a PMAP plan with them is to reapply if, and only if, she has had a decrease in household income. Other options mentioned by the rep were to reach out to [facility] financial worker/ to request mileage reimbursement options (forms required), seek out volunteer transportation agencies, or utilize public transportation.     SW reached out to the following social service agencies on this date:    [employer]- only provides transportation in [facility] for older adults  [employer] - patient is outside of their geographic area, they referred to [facility] (P: [phone])- unable to leave a , SW sent a message via their website inquiring on transportation options  [facility] (P: [phone])- do not offer transportation only car ownership program    SW will reach out to other [place] SW to consult on possible transportation options that are volunteer based.     Routing to Dr.Lombardi, , and .       Gogo Hernandez [facility]

## 2017-12-22 ENCOUNTER — TELEPHONE (OUTPATIENT)
Dept: FAMILY MEDICINE | Facility: CLINIC | Age: 27
End: 2017-12-22

## 2017-12-22 NOTE — TELEPHONE ENCOUNTER
----- Message from Nathaniel Lombardi, PhD sent at 12/22/2017  9:29 AM CST -----  Regarding: Request to get patient scheduled with Dr. Brizuela for follow-up appointment.  Good morning,    If possible, could you please place an outreach call to this patient in order to get them scheduled for a follow-up appointment with Dr. Brizuela, ideally in approximately two weeks or so? It doesn't look like she scheduled a follow-up appointment after her visit with him this week as planned.    Thank you,  -josesito

## 2017-12-22 NOTE — TELEPHONE ENCOUNTER
[facility] phone call message- general phone call:    Reason for call: questions about seizure meds.     Return call needed: Yes    OK to leave a message on voice mail? Yes    Primary language: Jennifer      needed? Yes    Call taken on December 22, 2017 at 2:09 PM by Flavia Plata

## 2017-12-22 NOTE — TELEPHONE ENCOUNTER
Discussed the following w/ Dr. Herman's RN (neuro). If Dr. Herman changes anything, they will call us.   A/P:  1. Seizures (H)  Patient understands that she should wait until medical control until pregnancy, she continues to have sex without protection. She has mention several times of wanting to have baby and wanted med rec done to ensure medicine safe for baby. Concern that pt is attempting to get pregnant despite voicing understanding and repeated discussion how medication may not be safest for baby. Depakote will be slowly tapered down (from BID to daily for 2 weeks) and lamictal will be tapered up for the next coming 2 weeks. Dr. Herman Neurologist will be called about plan  - lamoTRIgine (LAMICTAL) 25 MG tablet; Take 1 tablet (25 mg) by mouth every 48 hours for 14 days  Dispense: 7 tablet; Refill: 0  - at next visit, increase lamotrigine to daily and d/c depakote    Routed to Dr. Brizuela. /TINA Mon    /ElieserRN

## 2018-01-04 NOTE — PROGRESS NOTES
I have called [facility] to schedule [facility] for psychiatry services.   I have registered patient with them, however, her insurance in inactive.   I have also had out  staff check as well and they verified that it is inactive.   I have called and left a message for Alyssa, her [facility] worker to see if she has additional information on this.     Gogo, do you have any other ideas on addressing this?  She does have a couple of upcoming appointments here in clinic, 1/10 with Dr. Lombardi (which we will cancel anyhow because she is receiving in home therapy services) and a follow up with Dr. Brizuela on 1/15.     I have attempted to contact patient via language line, however, no answer.

## 2018-01-04 NOTE — PROGRESS NOTES
Reviewed notes below. Please route insurance questions or the call back from [facility] worker to  if any follow up is required at that time.       YO Mccullough

## 2018-01-04 NOTE — PROGRESS NOTES
Received a call back from Alyssa, she was unaware that insurance had lapsed and actually has an appointment to meet with the family tomorrow in house.   She will address this at that time and will follow up with us once she knows more information.  Gogo, I do not think you need to do anything with this at this time.   Thank you!

## 2018-01-15 ENCOUNTER — OFFICE VISIT (OUTPATIENT)
Dept: FAMILY MEDICINE | Facility: CLINIC | Age: 28
End: 2018-01-15
Payer: COMMERCIAL

## 2018-01-15 VITALS
TEMPERATURE: 98 F | WEIGHT: 120.6 LBS | SYSTOLIC BLOOD PRESSURE: 97 MMHG | DIASTOLIC BLOOD PRESSURE: 61 MMHG | BODY MASS INDEX: 22.6 KG/M2 | HEART RATE: 72 BPM

## 2018-01-15 DIAGNOSIS — E55.9 VITAMIN D DEFICIENCY: ICD-10-CM

## 2018-01-15 DIAGNOSIS — F41.9 ANXIETY DISORDER, UNSPECIFIED TYPE: ICD-10-CM

## 2018-01-15 DIAGNOSIS — F32.A DEPRESSION, UNSPECIFIED DEPRESSION TYPE: ICD-10-CM

## 2018-01-15 DIAGNOSIS — Z30.016 ENCOUNTER FOR INITIAL PRESCRIPTION OF TRANSDERMAL PATCH HORMONAL CONTRACEPTIVE DEVICE: Primary | ICD-10-CM

## 2018-01-15 DIAGNOSIS — R56.9 SEIZURES (H): ICD-10-CM

## 2018-01-15 DIAGNOSIS — F43.10 PTSD (POST-TRAUMATIC STRESS DISORDER): ICD-10-CM

## 2018-01-15 RX ORDER — PRAZOSIN HYDROCHLORIDE 1 MG/1
1 CAPSULE ORAL AT BEDTIME
Qty: 30 CAPSULE | Refills: 1 | Status: SHIPPED | OUTPATIENT
Start: 2018-01-15 | End: 2018-04-12

## 2018-01-15 RX ORDER — NORELGESTROMIN AND ETHINYL ESTRADIOL 35; 150 UG/MG; UG/MG
PATCH TRANSDERMAL
Qty: 9 PATCH | Refills: 0 | Status: SHIPPED | OUTPATIENT
Start: 2018-01-15 | End: 2018-04-24

## 2018-01-15 RX ORDER — SERTRALINE HYDROCHLORIDE 100 MG/1
200 TABLET, FILM COATED ORAL DAILY
Qty: 30 TABLET | Refills: 1 | Status: SHIPPED | OUTPATIENT
Start: 2018-01-15 | End: 2018-03-09

## 2018-01-15 RX ORDER — LAMOTRIGINE 25 MG/1
25 TABLET ORAL DAILY
Qty: 30 TABLET | Refills: 0 | Status: SHIPPED | OUTPATIENT
Start: 2018-01-15 | End: 2018-01-16

## 2018-01-15 RX ORDER — LANOLIN ALCOHOL/MO/W.PET/CERES
800 CREAM (GRAM) TOPICAL DAILY
Qty: 90 TABLET | Refills: 3 | Status: SHIPPED | OUTPATIENT
Start: 2018-01-15 | End: 2018-05-10

## 2018-01-15 RX ORDER — RISPERIDONE 1 MG/1
1 TABLET, ORALLY DISINTEGRATING ORAL DAILY
Qty: 30 TABLET | Refills: 0 | Status: SHIPPED | OUTPATIENT
Start: 2018-01-15 | End: 2018-01-19

## 2018-01-15 ASSESSMENT — PATIENT HEALTH QUESTIONNAIRE - PHQ9: SUM OF ALL RESPONSES TO PHQ QUESTIONS 1-9: 15

## 2018-01-15 ASSESSMENT — ANXIETY QUESTIONNAIRES
2. NOT BEING ABLE TO STOP OR CONTROL WORRYING: SEVERAL DAYS
4. TROUBLE RELAXING: SEVERAL DAYS
1. FEELING NERVOUS, ANXIOUS, OR ON EDGE: NEARLY EVERY DAY
3. WORRYING TOO MUCH ABOUT DIFFERENT THINGS: NOT AT ALL
GAD7 TOTAL SCORE: 11
6. BECOMING EASILY ANNOYED OR IRRITABLE: MORE THAN HALF THE DAYS
5. BEING SO RESTLESS THAT IT IS HARD TO SIT STILL: SEVERAL DAYS
7. FEELING AFRAID AS IF SOMETHING AWFUL MIGHT HAPPEN: NEARLY EVERY DAY

## 2018-01-15 NOTE — PROGRESS NOTES
SUBJECTIVE     Chief Complaint   Patient presents with     RECHECK     f/u on medication,        Subjective: Norman Watts is a 28 year old female with PMH including anxiety, depression, and seizures here for medication recheck.    Patient comes in for recheck of medications.  She has been following with Dr. Brizuela (PCP) and Dr. Lombardi (Behavioral Health) since October of last year, when she presented to clinic severely depressed and suicidal, at which time she was immediately admitted to [facility] for stabilization.  At [facility] she was started on risperidone.  Patient's course has been complicated by seizures, for which she is following with neurology, and patient also would like to get pregnant and start a family soon.  She has not been on birth control.  Her last period ended January 13.    She feels like her mood is unchanged since her last visit.  Her  notes that she seems sleepier and somewhat better.  They do think she might of had a seizure recently as she woke up with a bitten tongue.  She does have an upcoming neurological appointment at the beginning of February.  They bring in all of her medications, most of which have not been taken correctly.  She has only been taking half the prescribed dose of sertraline, and has not been taking valproic acid for the past 2 weeks.  She has not been taking prazosin.    She continues to have suicidal thoughts, no active plan.  She has the number for mental health crisis available to call, however is nervous because she does not speak English.  She also notes that it is worse when she is home alone, as every noise makes her scared.  She continues to have nightmares and flashbacks.  She does have an [facility] worker and a therapist come out to her house, this has been very helpful.    She would like to start a family soon, which has been an issue during prior visits, and after discussing the risks and benefits of pregnancy during mood stabilization and  epilepsy stabilization and risks of antiepileptic medications, she is open to further discussion about birth control today.  She denies any history of blood clots, migraines, or smoking.    ROS:  GI: No nausea, vomiting.  : No dysuria or hematuria. Menses regular every month, last one ended 1/13.  Psychiatric: as above    PMH/PSH, FamHx, Meds, Allergies reviewed and updated as needed.    Social History     Social History     Marital status:      Spouse name: N/A     Number of children: N/A     Years of education: N/A     Social History Main Topics     Smoking status: Never Smoker     Smokeless tobacco: Never Used     Alcohol use No     Drug use: No     Sexual activity: Not Asked     Other Topics Concern     None     Social History Narrative           OBJECTIVE     BP 97/61  Pulse 72  Temp 98  F (36.7  C) (Oral)  Wt 120 lb 9.6 oz (54.7 kg)  LMP 01/13/2018 (Approximate)  BMI 22.6 kg/m2  Body mass index is 22.6 kg/(m^2).    Physical exam:  Psych: Flat affect, makes intermittent eye contact.  No evidence to suggest hallucinations.  Speech is slowed, however appears thoughtful.  No tears on exam.  Mood is depressed and affect is somewhat congruent.   is with and is supportive.  Thoughts appear congruent.    PHQ-9 SCORE 10/20/2017 12/7/2017 1/15/2018   Total Score 18 16 15     RY-7 SCORE 10/20/2017 10/23/2017 1/15/2018   Total Score 5 15 11         No results found for this or any previous visit (from the past 24 hour(s)).      ASSESSMENT AND PLAN     Norman Watts is a 28 year old female here for med refills    1. Anxiety disorder, unspecified type  2. Depression, unspecified depression type  Patient has severe depression and anxiety with close follow-up with [facility] worker and therapist making at home visits.  helps set up meds, but there is a lot of confusion. Will have them make a nurse visit to help set up x1 so he knows how to continue doing it. Continues to have poor mood and a lot of  "anxiety, PHq9/GAD7 essentially unchanged from prior. Continues to have suicidal ideation, denies active plan, able to contract for safety. Has phone number available for crisis but doesn't speak english so we practiced \"needs Jennifer \" in office today. Will increase sertraline to 200 mg as originally prescribed (had only been taking 100 mg). Has nightmares and flash backs and hasn't taken prazosin so will resume. Will also refill risperidone. Will defer to Dr. Brizuela (PCP) about further psychiatry involvement as he is coordinating care with multiple organizations already. Discussed heavily the risks of both pregnancy during period of mental stabilization and pregnancy during anti-epileptic titration and recommended a short period of birth control during stabilization. Patient agrees in clinic, however I am not sure she will actually continue the birth control or if she is just agreeing with me. Did prescribe the patch (no contraindications, short return to fertility, lower BMI) and went over correct use. Follow up with Dr. Brizuela in 2 weeks.      - risperiDONE (RISPERDAL M-TABS) 1 MG ODT tab; Take 1 tablet (1 mg) by mouth daily  Dispense: 30 tablet; Refill: 0  - sertraline (ZOLOFT) 100 MG tablet; Take 2 tablets (200 mg) by mouth daily  Dispense: 30 tablet; Refill: 1  - prazosin (MINIPRESS) 1 MG capsule; Take 1 capsule (1 mg) by mouth At Bedtime  Dispense: 30 capsule; Refill: 1    3. Seizures (H)  Hasn't been taking valproic acid for 2 weeks. Will uptitrate lamictal as discussed over phone by RN with Dr. Herman of neurology's clinic, however did not continue valproic acid as plan was to titrate down with eventual plan to discontinue. May still be having breakthrough seizures, however had neuro appt scheduled for 2 weeks. Will start birth control patch today, however patient may not actually take this so will continue folic acid. Addendum: Discussed lamotrigine + birth control patch further with pharmacy. " Patch may decrease lamotrigine efficacy. Although I am not sure patient will actually use birth control patch, think it is safer to increase her dose as recommended to 50 mg daily especially as she may be having breakthrough seizures. Discussed with her PCP Dr. Brizuela who agrees with that plan  - folic acid (FOLVITE) 400 MCG tablet; Take 2 tablets (800 mcg) by mouth daily  Dispense: 90 tablet; Refill: 3  - lamoTRIgine (LAMICTAL) 25 mg tablet; Take 2 tablets (50 mg total) daily Dispense 60 tablet: Refill 0    4. Encounter for initial prescription of transdermal patch hormonal contraceptive device  Discussed multiple birth control options, patient opts for patch. No contraindications. Discussed with pharmacy, no significant interaction with her other medications  - norelgestromin-ethinyl estradiol (ORTHO EVRA) 150-35 MCG/24HR patch; Remove old patch and apply new patch onto the skin once a week for 3 weeks (21 days). Do not wear patch week 4 (days 22-28), then repeat.  Dispense: 9 patch; Refill: 0    5. Vitamin D deficiency  Refilled  - cholecalciferol (VITAMIN D3) 1000 UNIT tablet; Take 1 tablet (1,000 Units) by mouth daily  Dispense: 100 tablet; Refill: 3    6. PTSD (post-traumatic stress disorder)  Endorses nightmares and flash backs, no formal PTSD dx that I see. Does feel lightheaded in the morning, soft BPs today, counseled to have large glass of water at bedside to drink every AM  - prazosin (MINIPRESS) 1 MG capsule; Take 1 capsule (1 mg) by mouth At Bedtime  Dispense: 30 capsule; Refill: 1        Hafsa Aguilar MD, PGY-2  I precepted today with Davy Lawson MD.

## 2018-01-15 NOTE — MR AVS SNAPSHOT
After Visit Summary   1/15/2018    Norman Watts    MRN: [id number]           Patient Information     Date Of Birth          1990        Visit Information        Provider Department      1/15/2018 4:30 PM Hafsa Aguilar MD [facility]        Today's Diagnoses     Encounter for initial prescription of transdermal patch hormonal contraceptive device    -  1    Anxiety disorder, unspecified type        Depression, unspecified depression type        Seizures (H)        Vitamin D deficiency        PTSD (post-traumatic stress disorder)           Follow-ups after your visit        Who to contact     Please call your clinic at [phone] to:    Ask questions about your health    Make or cancel appointments    Discuss your medicines    Learn about your test results    Speak to your doctor   If you have compliments or concerns about an experience at your clinic, or if you wish to file a complaint, please contact [facility] Patient Relations at [phone] or email us at [email or web address]         Additional Information About Your Visit        MyChart Information     [employer] is an electronic gateway that provides easy, online access to your medical records. With [facility], you can request a clinic appointment, read your test results, renew a prescription or communicate with your care team.     To sign up for [employer] visit the website at [email or web address]   You will be asked to enter the access code listed below, as well as some personal information. Please follow the directions to create your username and password.     Your access code is: [email or web address]  Expires: 4/15/2018  5:30 PM     Your access code will  in 90 days. If you need help or a new code, please contact your [facility] or call [phone] for assistance.        Care EveryWhere ID     This is your Care EveryWhere ID. This could be used by other  organizations to access your [place] medical records  [id number]        Your Vitals Were     Pulse Temperature Last Period BMI (Body Mass Index)          72 98  F (36.7  C) (Oral) 01/13/2018 (Approximate) 22.6 kg/m2         Blood Pressure from Last 3 Encounters:   01/15/18 97/61   12/20/17 103/66   12/06/17 99/66    Weight from Last 3 Encounters:   01/15/18 120 lb 9.6 oz (54.7 kg)   12/20/17 113 lb (51.3 kg)   11/03/17 111 lb 9.6 oz (50.6 kg)              Today, you had the following     No orders found for display         Today's Medication Changes          These changes are accurate as of: 1/15/18  5:33 PM.  If you have any questions, ask your nurse or doctor.               Start taking these medicines.        Dose/Directions    cholecalciferol 1000 UNIT tablet   Commonly known as:  vitamin D3   Used for:  Vitamin D deficiency   Replaces:  cholecalciferol 90798 UNITS capsule   Started by:  Hafsa Aguilar MD        Dose:  1000 Units   Take 1 tablet (1,000 Units) by mouth daily   Quantity:  100 tablet   Refills:  3       norelgestromin-ethinyl estradiol 150-35 MCG/24HR patch   Commonly known as:  ORTHO EVRA   Used for:  Encounter for initial prescription of transdermal patch hormonal contraceptive device   Started by:  Hafsa Aguilar MD        Remove old patch and apply new patch onto the skin once a week for 3 weeks (21 days). Do not wear patch week 4 (days 22-28), then repeat.   Quantity:  9 patch   Refills:  0         These medicines have changed or have updated prescriptions.        Dose/Directions    lamoTRIgine 25 MG tablet   Commonly known as:  LaMICtal   This may have changed:  when to take this   Used for:  Seizures (H)   Changed by:  Hafsa Aguilar MD        Dose:  25 mg   Take 1 tablet (25 mg) by mouth daily   Quantity:  30 tablet   Refills:  0       prazosin 1 MG capsule   Commonly known as:  MINIPRESS   This may have changed:  when to take this   Used for:  Anxiety disorder,  unspecified type, PTSD (post-traumatic stress disorder)   Changed by:  Hafsa Aguilar MD        Dose:  1 mg   Take 1 capsule (1 mg) by mouth At Bedtime   Quantity:  30 capsule   Refills:  1         Stop taking these medicines if you haven't already. Please contact your care team if you have questions.     cholecalciferol 40116 UNITS capsule   Commonly known as:  VITAMIN D3   Replaced by:  cholecalciferol 1000 UNIT tablet   Stopped by:  Hafsa Aguilar MD           divalproex 500 MG EC tablet   Commonly known as:  DEPAKOTE   Stopped by:  Hafsa Aguilar MD           HYDROXYZINE HCL PO   Stopped by:  Hafsa Aguilar MD           prenatal multivitamin plus iron 27-0.8 MG Tabs per tablet   Stopped by:  Hafsa Aguilar MD                Where to get your medicines      These medications were sent to [facility] - [place], [place] - [address], [place] [address]     Phone:  [phone]     cholecalciferol 1000 UNIT tablet    folic acid 400 MCG tablet    lamoTRIgine 25 MG tablet    norelgestromin-ethinyl estradiol 150-35 MCG/24HR patch    prazosin 1 MG capsule    risperiDONE 1 MG ODT tab    sertraline 100 MG tablet                Primary Care Provider Office Phone # Fax #    Elvin Brizuela  [phone]       [address] [address]        Equal Access to Services     FRITZ FLORES AH: Hadii aad ku hadasho Soomaali, waaxda luqadaha, qaybta kaalmada adeegyada, summer esparza. So [facility] [phone].    ATENCIÓN: Si habla español, tiene a yarbrough disposición servicios gratuitos de asistencia lingüística. Renata al [phone].    We comply with applicable federal civil rights laws and [place] laws. We do not discriminate on the basis of race, color, national origin, age, disability, sex, sexual orientation, or gender identity.            Thank you!     Thank you for choosing [facility]  for  your care. Our goal is always to provide you with excellent care. Hearing back from our patients is one way we can continue to improve our services. Please take a few minutes to complete the written survey that you may receive in the mail after your visit with us. Thank you!             Your Updated Medication List - Protect others around you: Learn how to safely use, store and throw away your medicines at www.disposemymeds.org.          This list is accurate as of: 1/15/18  5:33 PM.  Always use your most recent med list.                   Brand Name Dispense Instructions for use Diagnosis    cholecalciferol 1000 UNIT tablet    vitamin D3    100 tablet    Take 1 tablet (1,000 Units) by mouth daily    Vitamin D deficiency       folic acid 400 MCG tablet    FOLVITE    90 tablet    Take 2 tablets (800 mcg) by mouth daily    Seizures (H)       lamoTRIgine 25 MG tablet    LaMICtal    30 tablet    Take 1 tablet (25 mg) by mouth daily    Seizures (H)       norelgestromin-ethinyl estradiol 150-35 MCG/24HR patch    ORTHO EVRA    9 patch    Remove old patch and apply new patch onto the skin once a week for 3 weeks (21 days). Do not wear patch week 4 (days 22-28), then repeat.    Encounter for initial prescription of transdermal patch hormonal contraceptive device       prazosin 1 MG capsule    MINIPRESS    30 capsule    Take 1 capsule (1 mg) by mouth At Bedtime    Anxiety disorder, unspecified type, PTSD (post-traumatic stress disorder)       risperiDONE 1 MG ODT tab    risperDAL M-TABS    30 tablet    Take 1 tablet (1 mg) by mouth daily    Anxiety disorder, unspecified type, Depression, unspecified depression type       sertraline 100 MG tablet    ZOLOFT    30 tablet    Take 2 tablets (200 mg) by mouth daily    Anxiety disorder, unspecified type

## 2018-01-16 RX ORDER — LAMOTRIGINE 25 MG/1
50 TABLET ORAL DAILY
Qty: 60 TABLET | Refills: 0 | Status: SHIPPED | OUTPATIENT
Start: 2018-01-16 | End: 2018-03-23

## 2018-01-16 ASSESSMENT — ANXIETY QUESTIONNAIRES: GAD7 TOTAL SCORE: 11

## 2018-01-17 NOTE — NURSING NOTE
I contacted Erin and asked her to help them set up an appointment.  She verbalized understanding of which patient it was and agreed to help them out.  Amie Breaux, [facility]

## 2018-01-19 ENCOUNTER — OFFICE VISIT (OUTPATIENT)
Dept: PHARMACY | Facility: CLINIC | Age: 28
End: 2018-01-19
Payer: COMMERCIAL

## 2018-01-19 ENCOUNTER — TELEPHONE (OUTPATIENT)
Dept: FAMILY MEDICINE | Facility: CLINIC | Age: 28
End: 2018-01-19

## 2018-01-19 VITALS
BODY MASS INDEX: 22.15 KG/M2 | TEMPERATURE: 97.7 F | OXYGEN SATURATION: 100 % | WEIGHT: 118.2 LBS | SYSTOLIC BLOOD PRESSURE: 92 MMHG | DIASTOLIC BLOOD PRESSURE: 58 MMHG | HEART RATE: 75 BPM

## 2018-01-19 DIAGNOSIS — F43.10 PTSD (POST-TRAUMATIC STRESS DISORDER): Primary | ICD-10-CM

## 2018-01-19 DIAGNOSIS — F41.9 ANXIETY DISORDER, UNSPECIFIED TYPE: Primary | ICD-10-CM

## 2018-01-19 DIAGNOSIS — F32.A DEPRESSION, UNSPECIFIED DEPRESSION TYPE: ICD-10-CM

## 2018-01-19 DIAGNOSIS — F41.9 ANXIETY DISORDER, UNSPECIFIED TYPE: ICD-10-CM

## 2018-01-19 DIAGNOSIS — F33.3 DEPRESSION, MAJOR, RECURRENT, SEVERE WITH PSYCHOSIS (H): ICD-10-CM

## 2018-01-19 RX ORDER — RISPERIDONE 1 MG/1
1 TABLET, ORALLY DISINTEGRATING ORAL DAILY
Qty: 30 TABLET | Refills: 0 | Status: SHIPPED | OUTPATIENT
Start: 2018-01-19 | End: 2018-01-19

## 2018-01-19 RX ORDER — RISPERIDONE 1 MG/1
1 TABLET ORAL DAILY
Qty: 90 TABLET | Refills: 3 | Status: SHIPPED | OUTPATIENT
Start: 2018-01-19 | End: 2018-04-24

## 2018-01-19 NOTE — MR AVS SNAPSHOT
After Visit Summary   2018    Norman Watts    MRN: [id number]           Patient Information     Date Of Birth          1990        Visit Information        Provider Department      2018 4:10 PM Marium Villafuerte, [facility]        Today's Diagnoses     Anxiety disorder, unspecified type    -  1       Follow-ups after your visit        Your next 10 appointments already scheduled     2018  2:50 PM CST   Return Visit with Elvin Brizuela DO   [facility]    [address] [place] [address]   [phone]              Who to contact     Please call your clinic at [phone] to:    Ask questions about your health    Make or cancel appointments    Discuss your medicines    Learn about your test results    Speak to your doctor   If you have compliments or concerns about an experience at your clinic, or if you wish to file a complaint, please contact [facility] Patient Relations at [phone] or email us at [email or web address]         Additional Information About Your Visit        MyChart Information     [employer] is an electronic gateway that provides easy, online access to your medical records. With [employer], you can request a clinic appointment, read your test results, renew a prescription or communicate with your care team.     To sign up for [employer] visit the website at [email or web address]   You will be asked to enter the access code listed below, as well as some personal information. Please follow the directions to create your username and password.     Your access code is: [email or web address]  Expires: 4/15/2018  5:30 PM     Your access code will  in 90 days. If you need help or a new code, please contact your [facility] or call [phone] for assistance.        Care EveryWhere ID     This is your Care EveryWhere ID. This could be used by other organizations to access your  [facility] medical records  [id number]        Your Vitals Were     Pulse Temperature Last Period Pulse Oximetry BMI (Body Mass Index)       75 97.7  F (36.5  C) (Oral) 01/13/2018 (Approximate) 100% 22.15 kg/m2        Blood Pressure from Last 3 Encounters:   01/19/18 92/58   01/15/18 97/61   12/20/17 103/66    Weight from Last 3 Encounters:   01/19/18 118 lb 3.2 oz (53.6 kg)   01/15/18 120 lb 9.6 oz (54.7 kg)   12/20/17 113 lb (51.3 kg)              We Performed the Following     MTM, EA ADDITIONAL 15 MIN (28177) x 2 (= 30 min.)          Today's Medication Changes          These changes are accurate as of: 1/19/18  9:36 PM.  If you have any questions, ask your nurse or doctor.               Start taking these medicines.        Dose/Directions    risperiDONE 1 MG tablet   Commonly known as:  risperDAL   Used for:  Anxiety disorder, unspecified type   Started by:  Marium Villafuerte [facility]        Dose:  1 mg   Take 1 tablet (1 mg) by mouth daily   Quantity:  90 tablet   Refills:  3            Where to get your medicines      These medications were sent to [facility] - [place], [place] - [address], [place] [address]     Phone:  [phone]     risperiDONE 1 MG tablet                Primary Care Provider Office Phone # Fax #    Elvin Brizuela [phone]       [address] [address]        Equal Access to Services     FRITZ FLORES AH: Hadii frandy ku hadasho Soomaali, waaxda luqadaha, qaybta kaalmada adeegyada, summer esparza. So [facility] [phone].    ATENCIÓN: Si habla david, tiene a yarbrough disposición servicios gratuitos de asistencia lingüística. Llame al [phone].    We comply with applicable federal civil rights laws and [place] laws. We do not discriminate on the basis of race, color, national origin, age, disability, sex, sexual orientation, or gender identity.            Thank you!     Thank you for  choosing [facility]  for your care. Our goal is always to provide you with excellent care. Hearing back from our patients is one way we can continue to improve our services. Please take a few minutes to complete the written survey that you may receive in the mail after your visit with us. Thank you!             Your Updated Medication List - Protect others around you: Learn how to safely use, store and throw away your medicines at www.disposemymeds.org.          This list is accurate as of: 1/19/18  9:36 PM.  Always use your most recent med list.                   Brand Name Dispense Instructions for use Diagnosis    cholecalciferol 1000 UNIT tablet    vitamin D3    100 tablet    Take 1 tablet (1,000 Units) by mouth daily    Vitamin D deficiency       folic acid 400 MCG tablet    FOLVITE    90 tablet    Take 2 tablets (800 mcg) by mouth daily    Seizures (H)       lamoTRIgine 25 MG tablet    LaMICtal    60 tablet    Take 2 tablets (50 mg) by mouth daily    Seizures (H)       norelgestromin-ethinyl estradiol 150-35 MCG/24HR patch    ORTHO EVRA    9 patch    Remove old patch and apply new patch onto the skin once a week for 3 weeks (21 days). Do not wear patch week 4 (days 22-28), then repeat.    Encounter for initial prescription of transdermal patch hormonal contraceptive device       prazosin 1 MG capsule    MINIPRESS    30 capsule    Take 1 capsule (1 mg) by mouth At Bedtime    Anxiety disorder, unspecified type, PTSD (post-traumatic stress disorder)       risperiDONE 1 MG tablet    risperDAL    90 tablet    Take 1 tablet (1 mg) by mouth daily    Anxiety disorder, unspecified type       sertraline 100 MG tablet    ZOLOFT    30 tablet    Take 2 tablets (200 mg) by mouth daily    Anxiety disorder, unspecified type

## 2018-01-19 NOTE — TELEPHONE ENCOUNTER
[facility] phone call message- general phone call:    Reason for call: She needs a PA for her Risperdal 1mg. She was in on the 15th and seen  for her depression the prescription was prescribed then but the pharmacy is telling them the PA was needed.She has been out of her medication for a while now and her depression is bad. They need a call back re how long the PA os going to take and if there is anything that can be prescribed till the PA is completed and approved.    Return call needed: Yes    OK to leave a message on voice mail? Yes    Primary language: Jennifer      needed? Yes    Call taken on January 19, 2018 at 11:28 AM by Richard Oro

## 2018-01-19 NOTE — Clinical Note
Ben-  Can you follow up on this one for me on Monday?  It looks like there was a phone note going on her about the same time I was seeing her.  Kirt sent the dissolvable ones (the ones not covered) to [facility] [address], but I sent the correct ones to [facility].  I am pretty sure the patient picked up the right ones at [facility] because she hand Lamictal down there too.  Also perhaps talk to Karina about preferred pharmacy? I'd like her to get everything at one pharmacy, which appears to be [facility].  However, [facility] then we can switch things there.  Thanks. Marium

## 2018-01-19 NOTE — TELEPHONE ENCOUNTER
[facility] to [facility] [address] and should be covered advised to call if she has trouble getting this rx. /TINA Billings

## 2018-01-19 NOTE — TELEPHONE ENCOUNTER
[facility] to [facility] [address] and they told her that if she get the tablets prescribed instead of the dissolvable one she wont need the PA.They also want the prescription to go the that pharmacy.

## 2018-01-20 NOTE — PROGRESS NOTES
Medication Management Note                                                       Paw was referred by Dr. Aguilar for pharmacy services for MTM, education    MEDICATION REVIEW:  Discussed all medication indications, dosage and effectiveness, adverse effects, and adherence with patient/caregiver.    Pt had meds with them: yes  Pt had med list with them: no  Pt was knowledgeable about meds: moderately  Medications set up by: , who is with her today  Medications administered by someone else (e.g., LTCF): No  Pt uses a medication box or automated dispenser: yes  Called pharmacy to obtain or clarify med list:  no  Called HHN or LTCF to obtain or clarify med list:  no    Medication Discrepancies  Medications on EMR med list that pt is NOT taking:  none  Medications pt IS taking that are NOT on EMR med list (e.g., from specialist, hospital): none  OTC meds/ dietary supplements pt taking on own that are NOT on EMR med list:  none  Dosage listed differently than how patient is taking: none  Frequency listed differently than how patient is taking: none  Duplicate medication on list (two occurrences of the sa0me medication):  none  TOTAL NUMBER OF MEDICATION DISCREPANCIES:  0    Subjective                                                       Patient reports the following problems or concerns with their medications:  none  Patient reports the following adverse reactions to medications:  none  Pt reports missing doses:  never  Additional subjective information (e.g., reason for visit, frequency of PRNs, reasons meds were D/C ed):    I did not know exactly why patient was here to see me, and neither did the patient.  Patient/ just picked up all of the medications that were filled earlier this week from the pharmacy.  The did not know much about the birth control patch, so the majority of my meeting with them was discussing how to use it. I told her to start today, and that she would need back up contraception x 1  "week, so either they need to use condoms or abstain.  When I offered condoms, patient's  stated \"we don't need condoms because when we have sex she doesn't get pregnant\".  Per the chart, and with speaking with Dr. Aguilar after my visit, it seems like there is a disconnect between what the patient/her  want and what her medical providers want.  Patient and her  would like to get pregnant, but providers would rather she get her mental health and seizure issues under control before she becomes pregnant.    Patient's  mentioned that the ortho-evra was $20 (for a 3 month supply) which was quite expensive.      Patient did not have risperdal with her; per notes in the chart, she needed the non-dissolvable kind to be covered by her insurance.     Objective                                                       Patient Active Problem List   Diagnosis     Seizures (H)     Chronic viral hepatitis B without delta agent and without coma (H)     Anxiety disorder, unspecified type     Depression     Unspecified Anxiety Disorder       Current Outpatient Prescriptions   Medication Sig Dispense Refill     risperiDONE (RISPERDAL) 1 MG tablet Take 1 tablet (1 mg) by mouth daily 90 tablet 3     [DISCONTINUED] risperiDONE (RISPERDAL M-TABS) 1 MG ODT tab Take 1 tablet (1 mg) by mouth daily 30 tablet 0     [DISCONTINUED] risperiDONE (RISPERDAL M-TABS) 1 MG ODT tab Take 1 tablet (1 mg) by mouth daily 30 tablet 0     lamoTRIgine (LAMICTAL) 25 MG tablet Take 2 tablets (50 mg) by mouth daily 60 tablet 0     cholecalciferol (VITAMIN D3) 1000 UNIT tablet Take 1 tablet (1,000 Units) by mouth daily 100 tablet 3     folic acid (FOLVITE) 400 MCG tablet Take 2 tablets (800 mcg) by mouth daily 90 tablet 3     sertraline (ZOLOFT) 100 MG tablet Take 2 tablets (200 mg) by mouth daily 30 tablet 1     prazosin (MINIPRESS) 1 MG capsule Take 1 capsule (1 mg) by mouth At Bedtime 30 capsule 1     norelgestromin-ethinyl estradiol " (ORTHO EVRA) 150-35 MCG/24HR patch Remove old patch and apply new patch onto the skin once a week for 3 weeks (21 days). Do not wear patch week 4 (days 22-28), then repeat. 9 patch 0     [DISCONTINUED] risperiDONE (RISPERDAL M-TABS) 1 MG ODT tab Take 1 tablet (1 mg) by mouth daily 30 tablet 0       Social History   Substance Use Topics     Smoking status: Never Smoker     Smokeless tobacco: Never Used     Alcohol use No       CrCl cannot be calculated (Patient's most recent sCr result is older than the maximum 90 days allowed.).    No results found for: A1C  Last Basic Metabolic Panel:  Lab Results   Component Value Date    .0 05/10/2017      Lab Results   Component Value Date    POTASSIUM 3.7 05/10/2017     Lab Results   Component Value Date    CHLORIDE 99.6 05/10/2017     Lab Results   Component Value Date    STEPHANIE 9.7 05/10/2017     Lab Results   Component Value Date    CO2 26.2 05/10/2017     Lab Results   Component Value Date    BUN 8.8 05/10/2017     Lab Results   Component Value Date    CR 0.6 05/10/2017     Lab Results   Component Value Date    GLC 97.7 05/10/2017       BP Readings from Last 3 Encounters:   01/19/18 92/58   01/15/18 97/61   12/20/17 103/66       The ASCVD Risk score (Charis HAYDEE Jr, et al., 2013) failed to calculate for the following reasons:    The 2013 ASCVD risk score is only valid for ages 40 to 79    PHQ-9 score:    PHQ-9 SCORE 1/15/2018   Total Score 15               Assessment                                                       Need for contraception -  In progress     Just picked up; needs education    Mood disorder -  In progress     Has not been able to fill risperdal.    Polypharmacy -  uncontrolled     Needs general education on medications    Plan/Recommendations                                                       Updated medication list in the EMR; deleted meds patient no longer taking and added meds patient is now taking, and changed doses where there was a dose  discrepancy.    All medications were reviewed and found to be indicated, effective, safe and convenient/ affordable unless drug therapy problem(s) was/were identified, as are described below.      Completed at this visit     Need for contraception    Provided education on OrthoEvra    Mood disorder    [facility]     Note, at the time of writing this note, I noticed that it appears that Dr. Moralez re-sent the dissolvable ones to [facility] (intended to send the correct ones) earlier in the day.  I think the patient likely got the correct ones from [facility].  Will ask triage to follow up Monday with Karina ([facility] worker)    Polypharmacy    Briefly went over other medications and what they are used for.    Options for treatment and/or follow-up care were reviewed with the patient.  Paw was engaged and actively involved in the decision making process, verbalized understanding of the options discussed, and was satisfied with the final plan.    Follow-up                                                       Patient should follow up next week with Dr Brizuela.  Patient was provided with written instructions/medication list via AVS.     Dr. Brizuela was provided the recommendations above  in clinic today and Dr. Beyer was available for supervision during this visit and is the authorizing prescriber for this visit through the pharmacist collaborative practice agreement.    Marium Villafuerte, PharmD      Drug therapy problems identified  1. Med: OrthoEvra - Compliance - needs education to use - Resolution: Educate patient; resolved  2. Med: risperidone - Compliance - not covered by insurance - Resolution: Change drug; resolved     # of medical conditions addressed: 3  # of medications addressed: 7  # of medication discrepancies identified: 0  # of DTP identified: 2  Time spent: 45 minutes  Level of service: 3

## 2018-01-21 ENCOUNTER — HEALTH MAINTENANCE LETTER (OUTPATIENT)
Age: 28
End: 2018-01-21

## 2018-01-22 NOTE — PROGRESS NOTES
Preceptor attestation:  Patient seen and discussed with the resident. Assessment and plan reviewed with resident and agreed upon.  Supervising physician: Paco Lawson  [facility]

## 2018-02-02 ENCOUNTER — TRANSFERRED RECORDS (OUTPATIENT)
Dept: HEALTH INFORMATION MANAGEMENT | Facility: CLINIC | Age: 28
End: 2018-02-02

## 2018-02-06 DIAGNOSIS — F41.9 ANXIETY DISORDER, UNSPECIFIED TYPE: ICD-10-CM

## 2018-02-06 DIAGNOSIS — F43.10 PTSD (POST-TRAUMATIC STRESS DISORDER): ICD-10-CM

## 2018-02-06 RX ORDER — PRAZOSIN HYDROCHLORIDE 1 MG/1
CAPSULE ORAL
Qty: 30 CAPSULE | Refills: 1 | OUTPATIENT
Start: 2018-02-06

## 2018-02-06 RX ORDER — SERTRALINE HYDROCHLORIDE 100 MG/1
TABLET, FILM COATED ORAL
Qty: 30 TABLET | Refills: 1 | OUTPATIENT
Start: 2018-02-06

## 2018-02-21 ENCOUNTER — OFFICE VISIT (OUTPATIENT)
Dept: FAMILY MEDICINE | Facility: CLINIC | Age: 28
End: 2018-02-21
Payer: COMMERCIAL

## 2018-02-21 VITALS
BODY MASS INDEX: 21.78 KG/M2 | SYSTOLIC BLOOD PRESSURE: 100 MMHG | HEART RATE: 90 BPM | TEMPERATURE: 98.4 F | DIASTOLIC BLOOD PRESSURE: 64 MMHG | WEIGHT: 116.2 LBS

## 2018-02-21 DIAGNOSIS — F41.9 ANXIETY DISORDER, UNSPECIFIED TYPE: ICD-10-CM

## 2018-02-21 DIAGNOSIS — F32.3 SEVERE SINGLE CURRENT EPISODE OF MAJOR DEPRESSIVE DISORDER, WITH PSYCHOTIC FEATURES (H): ICD-10-CM

## 2018-02-21 DIAGNOSIS — F51.05 INSOMNIA DUE TO OTHER MENTAL DISORDER: Primary | ICD-10-CM

## 2018-02-21 DIAGNOSIS — F99 INSOMNIA DUE TO OTHER MENTAL DISORDER: Primary | ICD-10-CM

## 2018-02-21 RX ORDER — HYDROXYZINE HYDROCHLORIDE 25 MG/1
25-50 TABLET, FILM COATED ORAL
Qty: 60 TABLET | Refills: 1 | Status: SHIPPED | OUTPATIENT
Start: 2018-02-21 | End: 2018-04-10

## 2018-02-21 NOTE — PATIENT INSTRUCTIONS
- Set up appointment with me in 2-3 weeks  - Have [facility] worker call me at clinic [phone] for citizen ship waiver process  - Take this new medication only at night time 30 min before going to sleep

## 2018-02-21 NOTE — PROGRESS NOTES
"  Family History   Problem Relation Age of Onset     DIABETES No family hx of      Coronary Artery Disease No family hx of      Other Cancer No family hx of      Social History     Social History     Marital status:      Spouse name: N/A     Number of children: N/A     Years of education: N/A     Social History Main Topics     Smoking status: Never Smoker     Smokeless tobacco: Never Used     Alcohol use No     Drug use: No     Sexual activity: Not Asked     Other Topics Concern     None     Social History Narrative       Nursing Notes:   Chante Nobles  2/21/2018  4:44 PM  Signed   name: Erin Hernandez  Language: Jennifer  Agency: [employer]  Phone number: [phone]    Chief Complaint   Patient presents with     RECHECK     f/u from last visit     Blood pressure 100/64, pulse 90, temperature 98.4  F (36.9  C), temperature source Oral, weight 116 lb 3.2 oz (52.7 kg), last menstrual period 02/12/2018, not currently breastfeeding.    S:  Patient is here for 2 reasons. To recheck on depression and to get a citizenship waiver. Pateint reports that there are 2 people that visit her weekly. \"They don't prescribe medications\" to her but report that talking with her helping a lot. PHQ 9 today is 15, last one was 16 on 12/7/17. Patient reports she no longer sees animals or people on the wall in the room at night. Patient reports that she still feels scared and startled easily. She admits to having shaking hands and knees at night. Reports that as she is about to fall asleep, she often gets startled and gets awake again. No thoughts of hurting self or others. Patient reportst that don sierra (psychotherapist at [phone]). Is one of the people that talks to her.      Report from neurologist also noted, pt report that she has been compliant with medications. No more seizures since last seen.     Patient reports that she still is trying to get pregnant. Is no longer using the patch that she was prescribed. "     Regarding citizenship waiver. Due to anxiety, patient is unable to concentrate. Unable to learn answers or focus on studying.    O:  /64  Pulse 90  Temp 98.4  F (36.9  C) (Oral)  Wt 116 lb 3.2 oz (52.7 kg)  LMP 02/12/2018 (Approximate)  BMI 21.78 kg/m2  General: On Chair, no acute distress  HEENT: No conjunctivitis, EOM grossly intact,   Heart: RRR, no murmurs, rubs, or clicks  Lungs: CTA all fields, no wheeze, no crackles, no respiratory distress  Extremites: No edema, no lesions noted. Hands and fingers are not shaky today as noted in the past  Skin: No lesions, no edema, excorations, or rashes noted    ASSESSMENT:/P:  1. Insomnia due to other mental disorder  Continued insomnia at night due to anxiety but much improved since last seen. Will Rx hydroxyzine as may also help with anxiety  - hydrOXYzine (ATARAX) 25 MG tablet; Take 1-2 tablets (25-50 mg) by mouth nightly as needed  Dispense: 60 tablet; Refill: 1    2. Anxiety disorder, unspecified type  3. Severe single current episode of major depressive disorder, with psychotic features (H)  Patient Continues to have episodes of depression. Discussed pt. With Psychotherapist at [facility]. They thought that she was seeing psychologist with our group but it was noted that patient was not seen since 12/20/17. They also noted that patient did not see Garcia for evalation. They will discuss with her next week importance of going to [facility] for full assessment. At next visit:  - Refer to psychiatrist for medical management  - discuss natalis and encourage to go as need diagnostic clarity for med Rx      Elvin Brizuela  Family Medicine Resident PGY3

## 2018-02-21 NOTE — NURSING NOTE
name: Erin Hernandez  Language: Jennifer  Agency: [facility]  Phone number: [phone]

## 2018-02-21 NOTE — MR AVS SNAPSHOT
After Visit Summary   2018    Norman Watts    MRN: [id number]           Patient Information     Date Of Birth          1990        Visit Information        Provider Department      2018 4:30 PM Elvin Brizuela DO [facility]        Today's Diagnoses     Insomnia due to other mental disorder    -  1      Care Instructions    - Set up appointment with me in 2-3 weeks  - Have [facility] worker call me at clinic [phone] for citizen ship waiver process  - Take this new medication only at night time 30 min before going to sleep          Follow-ups after your visit        Who to contact     Please call your clinic at [phone] to:    Ask questions about your health    Make or cancel appointments    Discuss your medicines    Learn about your test results    Speak to your doctor            Additional Information About Your Visit        MyChart Information     [employer] is an electronic gateway that provides easy, online access to your medical records. With [employer], you can request a clinic appointment, read your test results, renew a prescription or communicate with your care team.     To sign up for [employer] visit the website at [email or web address]   You will be asked to enter the access code listed below, as well as some personal information. Please follow the directions to create your username and password.     Your access code is: [email or web address]  Expires: 4/15/2018  5:30 PM     Your access code will  in 90 days. If you need help or a new code, please contact your [facility] or call [phone] for assistance.        Care EveryWhere ID     This is your Care EveryWhere ID. This could be used by other organizations to access your [place] medical records  [id number]        Your Vitals Were     Pulse Temperature Last Period BMI (Body Mass Index)          90 98.4  F (36.9  C) (Oral) 2018 (Approximate) 21.78 kg/m2         Blood Pressure from Last  3 Encounters:   02/21/18 100/64   01/19/18 92/58   01/15/18 97/61    Weight from Last 3 Encounters:   02/21/18 116 lb 3.2 oz (52.7 kg)   01/19/18 118 lb 3.2 oz (53.6 kg)   01/15/18 120 lb 9.6 oz (54.7 kg)              Today, you had the following     No orders found for display         Today's Medication Changes          These changes are accurate as of 2/21/18  5:07 PM.  If you have any questions, ask your nurse or doctor.               Start taking these medicines.        Dose/Directions    hydrOXYzine 25 MG tablet   Commonly known as:  ATARAX   Used for:  Insomnia due to other mental disorder   Started by:  Elvin Brizuela DO        Dose:  25-50 mg   Take 1-2 tablets (25-50 mg) by mouth nightly as needed   Quantity:  60 tablet   Refills:  1            Where to get your medicines      These medications were sent to [facility] - [place], [place] - [address], [place] [address]     Phone:  [phone]     hydrOXYzine 25 MG tablet                Primary Care Provider Office Phone # Fax #    Elvin Brizuela [phone]       [address] [address]        Equal Access to Services     FRITZ FLORES AH: Juan Manuel walkero Soomaali, waaxda luqadaha, qaybta kaalmada adeegyada, summer esparza. So [facility] [phone].    ATENCIÓN: Si habla español, tiene a yarbrough disposición servicios gratuitos de asistencia lingüística. Llame al [phone].    We comply with applicable federal civil rights laws and [place] laws. We do not discriminate on the basis of race, color, national origin, age, disability, sex, sexual orientation, or gender identity.            Thank you!     Thank you for choosing [facility]  for your care. Our goal is always to provide you with excellent care. Hearing back from our patients is one way we can continue to improve our services. Please take a few minutes to complete the written survey that  you may receive in the mail after your visit with us. Thank you!             Your Updated Medication List - Protect others around you: Learn how to safely use, store and throw away your medicines at www.disposemymeds.org.          This list is accurate as of 2/21/18  5:07 PM.  Always use your most recent med list.                   Brand Name Dispense Instructions for use Diagnosis    cholecalciferol 1000 UNIT tablet    vitamin D3    100 tablet    Take 1 tablet (1,000 Units) by mouth daily    Vitamin D deficiency       folic acid 400 MCG tablet    FOLVITE    90 tablet    Take 2 tablets (800 mcg) by mouth daily    Seizures (H)       hydrOXYzine 25 MG tablet    ATARAX    60 tablet    Take 1-2 tablets (25-50 mg) by mouth nightly as needed    Insomnia due to other mental disorder       lamoTRIgine 25 MG tablet    LaMICtal    60 tablet    Take 2 tablets (50 mg) by mouth daily    Seizures (H)       norelgestromin-ethinyl estradiol 150-35 MCG/24HR patch    ORTHO EVRA    9 patch    Remove old patch and apply new patch onto the skin once a week for 3 weeks (21 days). Do not wear patch week 4 (days 22-28), then repeat.    Encounter for initial prescription of transdermal patch hormonal contraceptive device       prazosin 1 MG capsule    MINIPRESS    30 capsule    Take 1 capsule (1 mg) by mouth At Bedtime    Anxiety disorder, unspecified type, PTSD (post-traumatic stress disorder)       risperiDONE 1 MG tablet    risperDAL    90 tablet    Take 1 tablet (1 mg) by mouth daily    Anxiety disorder, unspecified type       sertraline 100 MG tablet    ZOLOFT    30 tablet    Take 2 tablets (200 mg) by mouth daily    Anxiety disorder, unspecified type

## 2018-02-22 ENCOUNTER — TELEPHONE (OUTPATIENT)
Dept: FAMILY MEDICINE | Facility: CLINIC | Age: 28
End: 2018-02-22

## 2018-02-22 NOTE — TELEPHONE ENCOUNTER
Patient's Psychotherapist was called. Discussed concerns including clarification of diagnosis. Patient is diagnosed as Major depressive disorder single episode with psychotic features. In discussion, it was learned that patient did not go to [facility] for further diagnostic assessment. [facility] worker will talk to patient tomorrow to encourage her to go there. Also discussed medications for seizures, depression. Patient is also acutely worse this week as patient moved to a new building but overall was improving in affect. [facility] will need [facility] faxed to [phone] for them to fax records to us.    Elvin Brizuela  Family Medicine Resident PGY3

## 2018-02-23 ENCOUNTER — DOCUMENTATION ONLY (OUTPATIENT)
Dept: FAMILY MEDICINE | Facility: CLINIC | Age: 28
End: 2018-02-23

## 2018-02-23 ASSESSMENT — PATIENT HEALTH QUESTIONNAIRE - PHQ9: SUM OF ALL RESPONSES TO PHQ QUESTIONS 1-9: 15

## 2018-02-23 NOTE — PROGRESS NOTES
Interprofessional Team Consultation Note     Requesting Provider:     Consultants:  Behavioral Health: Dr. Jordan  Care Coordination: Gogo Whitman  PharmD: Dr. Villafuerte, PharmD Student  Family Medicine Resident:      IDENTIFYING DATA/REASON FOR REFERRAL:  Norman Watts is 28 year oldfemale who is cared for by Dr. Crenshaw. Dr. Brizuela is requesting consultation related to patient's current mental health. Relevant clinical information obtained from requesting PCP, interprofessional team members noted above and review of the medical record. ???    Topics Discussed:     discussed concerns in regards to patient's mental health. Per  patient had a recent inpatient hospitalization due to her mental health. Per the current records that we have of patient, she does not have a formal mental health diagnosis/diagnostic assessment on file. It is questionable that she might have schizoaffective disorder, depression, and anxiety, however, per our current records that is not confirmed. She does have established psychotherapy through [facility]. Her therapist goes to her home for treatment. She also has an [facility] worker through [facility]. To the teams knowledge she does not have established psychiatry. Per EPIC review, [place] has referred for psychiatry in the past but it does not appear patient was successfully connected.     Concerns presented include that patient and her  are actively trying to have a baby. They do not have any children and are eager to have a child. Patient was previously on Kepra to control what appears to be seizures that she has had in the past.  and inpatient psychiatry informed patient that the risks of being pregnant and on Kepra are very dangerous and advised to hold off on pregnancy efforts. She was taken off of the Kepra due to increase in suicidal ideations from the medication. In addition, she was on Depakote in the past which is also a  high risk should she conceive. Currently, patient is on Lamictal, hydroxyzine, Risperdal,  Vitamin D, folic acid, Zoloft, and Prazosin.  did do a consult with  regarding her psychiatry medications, in the past.  states that patient's mood, night terrors, and depression appear to be improving but she is still having a lot of symptoms. She does continue to have night terrors as she stays alone at night. She is likely having some hallucinations as well as she talks about seeing things that are not there in her bedroom and in the home at night. Her  works over night shifts and she feels that her symptoms increase when he is at work.     A concern that the patient and her spouse have brought up in the past is her ability to have children. She and her  have requesting a work-up to determine if they are fertile, as they have not been successful in conceiving a child despite trying for several months.  and other providers have been trying to educate the couple on the risks of her becoming pregnant while on the current cocktail of medications. They are persistent with their efforts to have a baby.     Patient is also requesting the completion of the [place], disability waiver for citizenship examination.  will review patient information/chart and determine if he has enough evidence to complete this.    Recommendations/Action Items:    1.)  will call [facility] and request to speak with the therapist to determine what diagnosis came of the DA that they presumably completed.     2.) Team goal is to identify what her mental health diagnosis is to better direct treatment at this time.     3.) Discussed getting a psychiatry consult, in clinic, with .    4.) Future visit in 2 weeks- SW will meet with patient to discuss getting a mental health  involved to assist with appointment follow up, medication management, and additional  mental health resources in the community.       Gogo Hernandez     Disclaimer  The above treatment recommendations are based on consultation with the patient's primary care provider and a review of relevant information in EPIC.? I have not personally examined the patient.? All recommendations should be implemented with considerations of the patient's relevant prior history and current clinical status.  Please contact me with any questions about the care of this patient.

## 2018-02-23 NOTE — PROGRESS NOTES
Preceptor attestation:  Patient seen and discussed with the resident. Assessment and plan reviewed with resident and agreed upon.  Supervising physician: Ruslan Feliz  [facility]

## 2018-02-26 NOTE — PROGRESS NOTES
Currently, Dr. Hernandez's next available appt is 4/2 at 8:30am.  If we can't get diagnostic clarity from the current mental health team in the community in the next couple of weeks, we could consider scheduling with Dr. Hernandez for help with this.  If Dr. Brizuela would like this consult, he should place an order requesting PCT and Ariela would reach out to the patient to set this up.  Just let us know what your preferences are Dr. Brizuela.  Thanks! Ana Jordan, Ph.D.,LP

## 2018-03-09 DIAGNOSIS — F41.9 ANXIETY DISORDER, UNSPECIFIED TYPE: ICD-10-CM

## 2018-03-09 RX ORDER — SERTRALINE HYDROCHLORIDE 100 MG/1
200 TABLET, FILM COATED ORAL DAILY
Qty: 90 TABLET | Refills: 3 | Status: SHIPPED | OUTPATIENT
Start: 2018-03-09 | End: 2018-05-10

## 2018-03-12 ENCOUNTER — OFFICE VISIT (OUTPATIENT)
Dept: FAMILY MEDICINE | Facility: CLINIC | Age: 28
End: 2018-03-12
Payer: COMMERCIAL

## 2018-03-12 VITALS
HEART RATE: 94 BPM | OXYGEN SATURATION: 99 % | BODY MASS INDEX: 21.98 KG/M2 | TEMPERATURE: 98.2 F | HEIGHT: 61 IN | RESPIRATION RATE: 16 BRPM | WEIGHT: 116.4 LBS | SYSTOLIC BLOOD PRESSURE: 108 MMHG | DIASTOLIC BLOOD PRESSURE: 66 MMHG

## 2018-03-12 DIAGNOSIS — F41.9 ANXIETY DISORDER, UNSPECIFIED TYPE: ICD-10-CM

## 2018-03-12 DIAGNOSIS — F32.3 SEVERE SINGLE CURRENT EPISODE OF MAJOR DEPRESSIVE DISORDER, WITH PSYCHOTIC FEATURES (H): Primary | ICD-10-CM

## 2018-03-12 NOTE — PROGRESS NOTES
"{  Family History   Problem Relation Age of Onset     DIABETES No family hx of      Coronary Artery Disease No family hx of      Other Cancer No family hx of      Social History     Social History     Marital status:      Spouse name: N/A     Number of children: N/A     Years of education: N/A     Social History Main Topics     Smoking status: Never Smoker     Smokeless tobacco: Never Used     Alcohol use No     Drug use: No     Sexual activity: Not Asked     Other Topics Concern     None     Social History Narrative       There are no exam notes on file for this visit.  Chief Complaint   Patient presents with     RECHECK     Follow up on wavier, patient state she have seizure x1 in Feb and x2 this month and patient are on medication     Blood pressure 108/66, pulse 94, temperature 98.2  F (36.8  C), temperature source Oral, resp. rate 16, height 5' 1.25\" (155.6 cm), weight 116 lb 6.4 oz (52.8 kg), last menstrual period 02/12/2018, SpO2 99 %, not currently breastfeeding.    S:  Patient is here for check up and to get waiver for N64    Feels better this visit. No thoughts of hurting self or others. Continued sadness but improved overall. Regarding seizure, Patient reports that she has had 3 seizure (1 in feb, 2 in march) admits to bitting tongue and lips. Feels very tired and sleepy after events. Has continued to take medications. Has an appointment with Dr. Herman (neuro) on April 23. Continues to meet with [facility] and councellor.     For the Waiver, patient has an intake interview on March 28th. Would like a waiver as unable to study due to inability to concentrate. Only able to do a few questions at a time and poor recall due to anxiety.    O:  /66 (BP Location: Left arm, Patient Position: Sitting, Cuff Size: Adult Regular)  Pulse 94  Temp 98.2  F (36.8  C) (Oral)  Resp 16  Ht 5' 1.25\" (155.6 cm)  Wt 116 lb 6.4 oz (52.8 kg)  LMP 02/12/2018 (Approximate)  SpO2 99%  BMI 21.81 kg/m2  General: On " chair, flat affect, has some shaking of hands and face when sitting down  Heart: RRR no murmurs, rubs, clicks,   Lungs: CTA  Neuro: No focal neurological deficits, has occasional tremor on lip and hand      A/P:  1. Severe single current episode of major depressive disorder, with psychotic features (H)  2. Anxiety disorder, unspecified type  Improving with AHRMS, pathways councellor and medication. May benefit from continued medical management with psychiatry and for diagnostic clarity. Will Refer to [facility] for both Diagnostic clarity, Medication management, and N64 waiver. As this will take time and citizen interview is in 2 weeks, may need to delay this and will schedule next appointment with  to see if there are ways to delay interview  - MENTAL HEALTH REFERRAL  -; Future    3. Legal circumstance  Citizen interview in 2 weeks, would need more time to see specialist and complete N64 waiver. Currently referred to [facility]. Legal referral to see if possible to defer citizen ship interview  - Legal Services Referral - [facility]      Elvin Brizuela  Family Medicine Resident PGY3

## 2018-03-12 NOTE — PATIENT INSTRUCTIONS
- See me again in 1 week, try to book an earlier time  - Continue with all medications  - We'll talk to our resources and get you an appointment with rosy      Legal referral has been sent to Matilde Barajas from [facility].  She will review, advise, and contact the patient.  Ariela Pham  03/13/18    Created letter for N648 to go to [facility]. Will also assist patient in scheduling for additional needs listed in the referral.  A message has been sent to the behavioral health team to advise for mental health referral. See documentation encounter for details.  Ariela  03/13/18

## 2018-03-12 NOTE — LETTER
2018      [facility]   [address] [place] [address] [address]  [place] [address]      Re:  Norman Watts :1990    To whom it may concern,    Norman Watts has been referred to you by their primary care provider, Elvin Brizuela MD, for help with a cognitive evaluation and completion of the N648 waiver form. Norman Watts has reported difficulty with being able to learn due to major depressive disorder which interfere with their ability to learn English and other information required by the citizenship exam process. Her interview is scheduled for 2018. We are hopeful that additional cognitive assessment will clarify current level of function and Norman Watts's ability to be successful with these requirements.      Thank you for assisting in the care of our patient.  Please let us know if you have questions about this referral or if you need additional information from us to complete the evaluation.    Sincerely,    Elvin Brizuela MD

## 2018-03-12 NOTE — MR AVS SNAPSHOT
After Visit Summary   3/12/2018    Norman Watts    MRN: [id number]           Patient Information     Date Of Birth          1990        Visit Information        Provider Department      3/12/2018 4:30 PM Elvin Brizuela DO [facility]        Today's Diagnoses     Severe single current episode of major depressive disorder, with psychotic features (H)    -  1    Anxiety disorder, unspecified type          Care Instructions    - See me again in 1 week, try to book an earlier time  - Continue with all medications  - We'll talk to our resources and get you an appointment with natilis          Follow-ups after your visit        Additional Services     MENTAL HEALTH REFERRAL  -       Use this form for in clinic and community psychiatry and behavioral health consults. The referral coordinator will help to determine whether patients are best served by clinic behavioral health staff or by community providers.    Reason for referral: 2 reasons for referral to [facility]: N64 waiver referral, Okay for Ariela to write referral letter. Also need diagnostic clarity for her psychiatric disorder as well as continued medical management. Patient's interview is on March 28th    Please provide data for below screening tools if available.   PHQ-9 Score:   Bipolar Screen:   RY & Score:  PC-PTSD Score:   MMSE:   Conor (ADHD):   MCHAT (Autism Screen):  Pediatric Symptom Checklist (PSC):   Other Screening measures used:     Type of referral requested (indicate all that apply):    Adult Psychiatry--for diagnosis and medication management and N64 waiver     needed: Yes  Language: Jennifer  (Ph[place] Only) Referral should be tracked (Yes/No)?                  Future tests that were ordered for you today     Open Future Orders        Priority Expected Expires Ordered    MENTAL HEALTH REFERRAL  - Routine  12/12/2018 3/12/2018            Who to contact     Please call your clinic at [phone] to:    Ask questions  "about your health    Make or cancel appointments    Discuss your medicines    Learn about your test results    Speak to your doctor            Additional Information About Your Visit        [employer] Information     [employer] gives you secure access to your electronic health record. If you see a primary care provider, you can also send messages to your care team and make appointments. If you have questions, please call your primary care clinic.  If you do not have a primary care provider, please call [phone] and they will assist you.      [facility] [facility] is an electronic gateway that provides easy, online access to your medical records. With [facility] [facility], you can request a clinic appointment, read your test results, renew a prescription or communicate with your care team.     To access your existing account, please contact your [facility] or call [phone] for assistance.        Care EveryWhere ID     This is your Care EveryWhere ID. This could be used by other organizations to access your [place] medical records  [id number]        Your Vitals Were     Pulse Temperature Respirations Height Last Period Pulse Oximetry    94 98.2  F (36.8  C) (Oral) 16 5' 1.25\" (155.6 cm) 02/12/2018 (Approximate) 99%    BMI (Body Mass Index)                   21.81 kg/m2            Blood Pressure from Last 3 Encounters:   03/12/18 108/66   02/21/18 100/64   01/19/18 92/58    Weight from Last 3 Encounters:   03/12/18 116 lb 6.4 oz (52.8 kg)   02/21/18 116 lb 3.2 oz (52.7 kg)   01/19/18 118 lb 3.2 oz (53.6 kg)               Primary Care Provider Office Phone # Fax #    Elvin DO Gelacio [phone]       [address] [address]        Equal Access to Services     FRITZ FLORES : Juan Manuel Childers, barb phillips, summer fontaine. [facility] [phone].    ATENCIÓN: Si habla español, tiene a yarbrough " disposición servicios gratuitos de asistencia lingüística. Renata cheek [phone].    We comply with applicable federal civil rights laws and [place] laws. We do not discriminate on the basis of race, color, national origin, age, disability, sex, sexual orientation, or gender identity.            Thank you!     Thank you for choosing [facility]  for your care. Our goal is always to provide you with excellent care. Hearing back from our patients is one way we can continue to improve our services. Please take a few minutes to complete the written survey that you may receive in the mail after your visit with us. Thank you!             Your Updated Medication List - Protect others around you: Learn how to safely use, store and throw away your medicines at www.disposemymeds.org.          This list is accurate as of 3/12/18  5:16 PM.  Always use your most recent med list.                   Brand Name Dispense Instructions for use Diagnosis    cholecalciferol 1000 UNIT tablet    vitamin D3    100 tablet    Take 1 tablet (1,000 Units) by mouth daily    Vitamin D deficiency       folic acid 400 MCG tablet    FOLVITE    90 tablet    Take 2 tablets (800 mcg) by mouth daily    Seizures (H)       hydrOXYzine 25 MG tablet    ATARAX    60 tablet    Take 1-2 tablets (25-50 mg) by mouth nightly as needed    Insomnia due to other mental disorder       lamoTRIgine 25 MG tablet    LaMICtal    60 tablet    Take 2 tablets (50 mg) by mouth daily    Seizures (H)       norelgestromin-ethinyl estradiol 150-35 MCG/24HR patch    ORTHO EVRA    9 patch    Remove old patch and apply new patch onto the skin once a week for 3 weeks (21 days). Do not wear patch week 4 (days 22-28), then repeat.    Encounter for initial prescription of transdermal patch hormonal contraceptive device       prazosin 1 MG capsule    MINIPRESS    30 capsule    Take 1 capsule (1 mg) by mouth At Bedtime    Anxiety disorder, unspecified type, PTSD (post-traumatic  stress disorder)       risperiDONE 1 MG tablet    risperDAL    90 tablet    Take 1 tablet (1 mg) by mouth daily    Anxiety disorder, unspecified type       sertraline 100 MG tablet    ZOLOFT    90 tablet    Take 2 tablets (200 mg) by mouth daily    Anxiety disorder, unspecified type

## 2018-03-13 ENCOUNTER — DOCUMENTATION ONLY (OUTPATIENT)
Dept: PSYCHOLOGY | Facility: CLINIC | Age: 28
End: 2018-03-13

## 2018-03-13 NOTE — PROGRESS NOTES
Patient was seen recently and a referral was placed.   I have created the letter and faxed it to [facility].  Once they have reviewed that I have asked Teresa, their new employee handling citizenship referrals, to call me so we can get this scheduled ASAP as patient is scheduled for her interview on 3/28/18.  It looks like patient is also in need of a DA and ongoing services, which I could also assist in scheduling there as well.    Please advise if there is anything further needed.

## 2018-03-13 NOTE — PROGRESS NOTES
Az Titus - thank you for being so proactive on this one given the short turn around time prior to [facility]  I am curious if the appt on 3/28 is the actual test, or just a preliminary interview.  Perhaps Sadia could help clarify for this if she contacts the patient (I see Dr. Brizuela put in a legal referral for help with this process).    Dr. Brizuela - Thanks for advocating on the part of this patient and helping to connect her to the necessary resources. It looks like MDD, anxiety and seizure disorder may all contribute to difficulties with attention and concentration.  Do we know if her in-home therapist is a licensed psychologist - if so that individual could potentially complete the N648 on the patient's behalf.  Alternatively, if this provider cannot complete the assessment themselves, they may be able to provide you with the diagnostic and symptom related information needed for you to complete the N648 on the behalf of the patient.  I know our plan from our last ITM was to reach out to MH team at [facility] to better coordinate services with them and I do think this is advisable in the context of current request for N648 waiver - particularly if mental health is a primary barrier to success with citizenship process.    If you have not had the time to reach out to [facility] on this, perhaps Ariela or a member of our care coordination team could make this call for you with the goal of requesting the followin.  A copy of most recent diagnostic assessment and treatment plan so that we can be clear on current diagnoses and plan of care from their perspective.  2.  A discussion of whether the patient has discussed need for N648 waiver with team at [facility] and clarification on whether or not they can complete this form on her behalf or whether they would be able/willing to work with Dr. Brizuela on the completion of this form.    3.  If they believe that additional learning testing is needed prior to  completion of N648 waiver, we can proceed with connecting patient to [facility] as suggested by Ariela below.    In addition, we had been working on a psychiatry visit for this patient and I did see that at one point we considered consultation with Dr. Hernandez, but this has not yet been set up.  Unfortunately, the 4/2 slot originally offered for this has been filled and the next available PCT slot with Dr. Hernandez would be 4/30.  I would like Dr. Brizuela' input on whether or not he would like us to work on setting up this consult with Dr. Hernandez at this time vs. Refer out for community psychiatry at [facility] (this may depend on how long it would take to arrange psychiatry visit at [facility]).  Please let us know your thoughts on this Dr. Brizuela so we will know how to proceed.  Regardless, I think we will want to coordinate with [facility] so that they are aware of any supplemental mental health referrals and can support the patient in follow up, etc.    I also want to make you aware of some of the smartphrases we have developed to help providers with the N648 process as it is quite complicated.  Please see below for a list of these phrases and let us know if you have any questions:    [id number] - to guide provider interview and follow up assessment decisions - a number of additional N648 resources (including referral info) are embedded in this smartphrase  [id number] - for use setting expectations with patients    Let me know if you have questions or would like additional follow up from me.  Thanks!  Ana

## 2018-03-14 NOTE — PROGRESS NOTES
Thanks for the message    [facility] was called and discussion was documented on 2/22/18 after the panel meeting. The therapist, Vanessa Milian, reports that she is therapist but not a psychologist nor a psychiatrist. Their current diagnosis is major depressive disorder, single episode with psychotic features. They had believed that she was seeing a psychologist with us and had thought that she already was going to [facility]. I discussed with her our concern and need for further diagnostic clarity and she was going to encourage Mac Austin to go to [facility]. The N648 was not discussed with them at that time and I don't know if they are able to fill out the form. Kalin is awaiting an OSCAR from [facility] to send their charts over which I unfortunately did not obtain yesterday. I will get it at the next visit. Fax is [phone] attn Vanessa Yuesara    Regarding Mental Health referral: Patient has missed an earlier appointment where we may have been able to refer to Dr. Hernandez. However, given her complexity (Anxiety, Recent suicidal ideation requiring hospitalization, Depression with psychotic features, others?), patient's needs would be better met with ongoing psychiatric management than a 1 time meeting. Patient was actually referred to community psychiatry including [facility] on 12/7/17 but they were not able to go for a variety of reason and their insurance may have lapsed at that time.    I have asked them to follow up with me next week and it appears that the appointment is on 3/23/18 which unfortunately does not give much time until the interview. It may be a prelimanry interview from the letter that they brought with them yesterday but I am not certain and [facility] this aspect. I will be in clinic tomorrow and will discuss the plan with SMRPhillip and Ariela and see what else can be done at that time to help Norman Brizuela  Family Medicine Resident PGY3

## 2018-03-14 NOTE — PROGRESS NOTES
Thanks for clarifying Dr. Brizuela.  Sorry I missed your phone note documenting conversation with [facility] therapist earlier.  So what I am distilling from this is that we are connecting the patient to [facility] for both cognitive assessment to support N648 waiver and for establishing psychiatry services.  She will continue therapy/[facility] services via [facility].  Thanks for keeping [facility] in the loop on this and eliciting their input.  Again, if it turns out that testing at [facility] will not be complete in time for needed N648 waiver and you feel comfortable completing this with current diagnostic and functional information from [facility], we can consider that as a back up plan.  My understanding is that the USCIS actually prefers it when the PCP, who knows the patient well, completes the form on the behalf of the patient.  Let me know if I can provide any additional support for this down the road.  Thanks!  Ana

## 2018-03-17 NOTE — PROGRESS NOTES
Preceptor attestation:  Patient seen and discussed with the resident. Assessment and plan reviewed with resident and agreed upon.  Supervising physician: Paco Lawson  [facility]

## 2018-03-20 NOTE — PROGRESS NOTES
Az Titus - Thanks for this clarification.  I see this patient is scheduled to see Dr. Brizuela on 3/23 and wonder if he might feel comfortable completing the waiver on this patient's behalf given that the assessment at [facility] will not take place prior to the date of her appointment with the [employer].  If so, it would likely be helpful to try to complete this form to a large degree prior to the visit on 3/23 as it is quite time consuming.  The visit time could be used to gather any additional needed information, get patient and  signatures, etc.  Even if the USCIS denies the waiver, she will likely feel better going to the appointment with one in hand if we can manage this.  Let me know if you have questions or if I can provide additional support for this process.  Thanks!  Ana Jordan, Ph.D.,LP

## 2018-03-20 NOTE — PROGRESS NOTES
Thanks for the update. I have started the N648 waiver note already and it should be ready for Friday. I have discussed the case with [facility] and even if the patient fails the interview, there are still opportunities for patient to submit the N648 waiver.       Elvin Brizuela  Family Medicine Resident PGY3

## 2018-03-20 NOTE — PROGRESS NOTES
I called [facility] to ask where the referral is at for [facility]. Spoke with Teresa who stated that her insurance had a lapse, however, it is now active.   She will be reaching out to [facility] to schedule, however, with that delay from insurance, it is extremely unlikely that she would be able to have the N648 waiver completed prior to 3/28/18.   Teresa states that this is a common issue with patients and even if they do their exam and fail it, they will use the areas the patient failed in to argue their case.

## 2018-03-21 NOTE — PROGRESS NOTES
In this case, should I communicate with [facility] that we will not proceed with the waiver, however, we can proceed with psychiatry?

## 2018-03-21 NOTE — PROGRESS NOTES
Az Titus,    Yes, please continue with psychiatry referral as patient will need further behavioural work up. Patient will need further diagnostic clarity and continued medical management for the patient psychiatric disorder. Do you know if the patient be able to make the Friday Appointment if her insurance has lapsed. Almost the same situation has happened in the past where patient has missed both the [facility] appointment and follow-up at the clinic due to insurance issues.     Thanks,    Elvin Brizuela  Family Medicine Resident PGY3

## 2018-03-23 ENCOUNTER — RECORDS - HEALTHEAST (OUTPATIENT)
Dept: ADMINISTRATIVE | Facility: OTHER | Age: 28
End: 2018-03-23

## 2018-03-23 ENCOUNTER — OFFICE VISIT (OUTPATIENT)
Dept: FAMILY MEDICINE | Facility: CLINIC | Age: 28
End: 2018-03-23
Payer: COMMERCIAL

## 2018-03-23 VITALS
TEMPERATURE: 98.2 F | RESPIRATION RATE: 20 BRPM | OXYGEN SATURATION: 100 % | DIASTOLIC BLOOD PRESSURE: 71 MMHG | HEART RATE: 90 BPM | SYSTOLIC BLOOD PRESSURE: 114 MMHG

## 2018-03-23 DIAGNOSIS — F32.3 SEVERE SINGLE CURRENT EPISODE OF MAJOR DEPRESSIVE DISORDER, WITH PSYCHOTIC FEATURES (H): Primary | ICD-10-CM

## 2018-03-23 DIAGNOSIS — R56.9 SEIZURES (H): ICD-10-CM

## 2018-03-23 RX ORDER — LAMOTRIGINE 150 MG/1
150 TABLET ORAL DAILY
Qty: 30 TABLET | Refills: 1 | Status: SHIPPED | OUTPATIENT
Start: 2018-03-23 | End: 2018-04-24

## 2018-03-23 NOTE — PROGRESS NOTES
{  Family History   Problem Relation Age of Onset     DIABETES No family hx of      Coronary Artery Disease No family hx of      Other Cancer No family hx of      Social History     Social History     Marital status:      Spouse name: N/A     Number of children: N/A     Years of education: N/A     Social History Main Topics     Smoking status: Never Smoker     Smokeless tobacco: Never Used     Alcohol use No     Drug use: No     Sexual activity: Not Asked     Other Topics Concern     None     Social History Narrative       Nursing Notes:   Amie Breaux CMA  3/23/2018  9:58 AM  Signed   name: Erin Hernandez  Language: Jennifer  Agency: [employer]  Phone number: [phone]    Chief Complaint   Patient presents with     Depression     Pt is here to follow up on Depression.     Forms     Pt is here with Citizenship forms to be completed.     Medication Reconciliation     Complete.      Blood pressure 114/71, pulse 90, temperature 98.2  F (36.8  C), temperature source Oral, resp. rate 20, last menstrual period 03/20/2018, SpO2 100 %, not currently breastfeeding.    S:  Patient reports that she is not doing well. She is continuing to have seizures: has had 4 seizures in the last 3 weeks. Has also bit her lip during last seizure yesterday. No incontinence. Patient repors compliance with lamotrigine. Does not know when next appointment with Dr. Herman is. Lip hurts. No headache, admits to dizziness. This ocurs after her seizures and lasts 2-3 days. Patient is no longer using the birth control patches    In regarding the N648 form, patient reports no one else is able to help fill it out. Patient's [facility] worker is sick.  Patient is concerned about whether or not continue with interview next week.  Patient has been is unable to come today to discuss N648 form.  Patient does not remember the name of the  who has helped her with preparation of the and 648.    O:  /71 (BP Location: Left arm, Patient  Position: Sitting, Cuff Size: Adult Regular)  Pulse 90  Temp 98.2  F (36.8  C) (Oral)  Resp 20  LMP 03/20/2018 (Exact Date)  SpO2 100%  Breastfeeding? No  General: Patient is on the chair, flat affect, answers with 1 or 2 word responses.    HEENT: EOM grossly intact, no scleral icterus noted, patient has a swollen bottom lip, there is teeth josé on the inside of the lip.  There is also red crusting.  No active bleeding.  No purulence, no discharge.  Trachea is midline, neck is supple, no cervical adenopathy palpated.  Thyroid has no nodules palpated  Heart: Regular rate and rhythm, no murmurs rubs or clicks  Lungs: Clear to auscultation bilaterally  Extremities: No edema, full range of motion bilaterally on upper extremities.  Normal gait.  Neurology: Cranial nerves II to XII intact, strength 5 out of 5, sensation intact and symmetric bilaterally.  No tremors noted on today's examination    A/P:  1. Severe single current episode of major depressive disorder, with psychotic features (H)  In 6 Devi was completed.  Both , and patient has signed a form.  Patient is aware that form is not completed and she will need to write her name, as well as her [place] identification on the form.  Patient also is to review the form with  and  prior to submission. [facility] the [facility] of the appointment to discuss case. PHQ of 24 today.   -Again reminded patient to present to interview for citizenship and to submit N 648 waiver at that time.  - [facility], appreciate recommendations  -Min to consider adding adjunctive therapy including Seroquel if depression continues  -Discussed with referral desk about continuing Natalis, psychiatric referral for diagnostic clarity and continued medical management of her psychiatric issue.    2. Seizures (H)  Increasing seizures in the last month after switching to Lamictal due to teratogenic effect of Depakote.  Patient reports that she has  been compliant however, has had 4 seizures in the last month.  Patient will return to neurology.  In the meantime, patient's lamotrigine has been increased to 150 mg.  At next appointment, can consider trying Lamictal levels to ensure the patient is compliant with medications given her history.  - lamoTRIgine (LAMICTAL) 150 MG tablet; Take 1 tablet (150 mg) by mouth daily  Dispense: 30 tablet; Refill: 1  -Obtain the medical levels at next appointment  - f/u with neuro for continued seizure      Elvin Brizuela  Family Medicine Resident PGY3

## 2018-03-23 NOTE — PROGRESS NOTES
Preceptor attestation:  Vital signs reviewed: /71 (BP Location: Left arm, Patient Position: Sitting, Cuff Size: Adult Regular)  Pulse 90  Temp 98.2  F (36.8  C) (Oral)  Resp 20  LMP 03/20/2018 (Exact Date)  SpO2 100%  Breastfeeding? No    Patient seen and discussed with the resident. Assessment and plan reviewed with resident and agreed upon.    Supervising physician: Chiquis Lozada MD  [facility]

## 2018-03-23 NOTE — PROGRESS NOTES
Ok, that sounds good. Garcia has a process where they will initially meet with a psychologist who does essentially a needs assessment prior to being able to meet with the psychiatrist. She will then be able to get connected with those services. I will communicate this with Garcia. Her insurance is currently active and hopefully it stays that way and doesn't disrupt further appointments.

## 2018-03-23 NOTE — NURSING NOTE
name: Erin Hernandez  Language: Jennifer  Agency: [facility]  Phone number: [phone]

## 2018-03-23 NOTE — MR AVS SNAPSHOT
After Visit Summary   3/23/2018    Norman Watts    MRN: [id number]           Patient Information     Date Of Birth          1990        Visit Information        Provider Department      3/23/2018 9:40 AM Elvin Brizuela DO [facility]        Today's Diagnoses     Seizures (H)          Care Instructions    Follow up in 2 weeks, for a 40 min visit          Follow-ups after your visit        Who to contact     Please call your clinic at [phone] to:    Ask questions about your health    Make or cancel appointments    Discuss your medicines    Learn about your test results    Speak to your doctor            Additional Information About Your Visit        MyChart Information     [employer] gives you secure access to your electronic health record. If you see a primary care provider, you can also send messages to your care team and make appointments. If you have questions, please call your primary care clinic.  If you do not have a primary care provider, please call [phone] and they will assist you.      [employer] is an electronic gateway that provides easy, online access to your medical records. With [employer], you can request a clinic appointment, read your test results, renew a prescription or communicate with your care team.     To access your existing account, please contact your [facility] or call [phone] for assistance.        Care EveryWhere ID     This is your Care EveryWhere ID. This could be used by other organizations to access your [place] medical records  [id number]        Your Vitals Were     Pulse Temperature Respirations Last Period Pulse Oximetry Breastfeeding?    90 98.2  F (36.8  C) (Oral) 20 03/20/2018 (Exact Date) 100% No       Blood Pressure from Last 3 Encounters:   03/23/18 114/71   03/12/18 108/66   02/21/18 100/64    Weight from Last 3 Encounters:   03/12/18 116 lb 6.4 oz (52.8 kg)   02/21/18 116 lb 3.2 oz (52.7 kg)   01/19/18 118 lb 3.2  oz (53.6 kg)              Today, you had the following     No orders found for display         Today's Medication Changes          These changes are accurate as of 3/23/18 10:25 AM.  If you have any questions, ask your nurse or doctor.               These medicines have changed or have updated prescriptions.        Dose/Directions    lamoTRIgine 150 MG tablet   Commonly known as:  LaMICtal   This may have changed:    - medication strength  - how much to take   Used for:  Seizures (H)   Changed by:  Elvin Brizuela DO        Dose:  150 mg   Take 1 tablet (150 mg) by mouth daily   Quantity:  30 tablet   Refills:  1            Where to get your medicines      These medications were sent to [facility] - [place], [place] - [address], [place] [address]     Phone:  [phone]     lamoTRIgine 150 MG tablet                Primary Care Provider Office Phone # Fax #    Elvin Brizuela [phone]       [address] [address]        Equal Access to Services     FRITZ FLORES AH: Hadii frandy ku hadasho Soomaali, waaxda luqadaha, qaybta kaalmada adeegyada, waxay rigoin haygustavo davis . So [facility] [phone].    ATENCIÓN: Si habla español, tiene a yarbrough disposición servicios gratuitos de asistencia lingüística. Periame al [phone].    We comply with applicable federal civil rights laws and [place] laws. We do not discriminate on the basis of race, color, national origin, age, disability, sex, sexual orientation, or gender identity.            Thank you!     Thank you for choosing [facility]  for your care. Our goal is always to provide you with excellent care. Hearing back from our patients is one way we can continue to improve our services. Please take a few minutes to complete the written survey that you may receive in the mail after your visit with us. Thank you!             Your Updated Medication List - Protect others around  you: Learn how to safely use, store and throw away your medicines at www.disposemymeds.org.          This list is accurate as of 3/23/18 10:25 AM.  Always use your most recent med list.                   Brand Name Dispense Instructions for use Diagnosis    cholecalciferol 1000 UNIT tablet    vitamin D3    100 tablet    Take 1 tablet (1,000 Units) by mouth daily    Vitamin D deficiency       folic acid 400 MCG tablet    FOLVITE    90 tablet    Take 2 tablets (800 mcg) by mouth daily    Seizures (H)       hydrOXYzine 25 MG tablet    ATARAX    60 tablet    Take 1-2 tablets (25-50 mg) by mouth nightly as needed    Insomnia due to other mental disorder       lamoTRIgine 150 MG tablet    LaMICtal    30 tablet    Take 1 tablet (150 mg) by mouth daily    Seizures (H)       norelgestromin-ethinyl estradiol 150-35 MCG/24HR patch    ORTHO EVRA    9 patch    Remove old patch and apply new patch onto the skin once a week for 3 weeks (21 days). Do not wear patch week 4 (days 22-28), then repeat.    Encounter for initial prescription of transdermal patch hormonal contraceptive device       prazosin 1 MG capsule    MINIPRESS    30 capsule    Take 1 capsule (1 mg) by mouth At Bedtime    Anxiety disorder, unspecified type, PTSD (post-traumatic stress disorder)       risperiDONE 1 MG tablet    risperDAL    90 tablet    Take 1 tablet (1 mg) by mouth daily    Anxiety disorder, unspecified type       sertraline 100 MG tablet    ZOLOFT    90 tablet    Take 2 tablets (200 mg) by mouth daily    Anxiety disorder, unspecified type

## 2018-03-24 ASSESSMENT — PATIENT HEALTH QUESTIONNAIRE - PHQ9: SUM OF ALL RESPONSES TO PHQ QUESTIONS 1-9: 24

## 2018-04-10 DIAGNOSIS — F41.9 ANXIETY DISORDER, UNSPECIFIED TYPE: ICD-10-CM

## 2018-04-10 DIAGNOSIS — F99 INSOMNIA DUE TO OTHER MENTAL DISORDER: ICD-10-CM

## 2018-04-10 DIAGNOSIS — F51.05 INSOMNIA DUE TO OTHER MENTAL DISORDER: ICD-10-CM

## 2018-04-10 DIAGNOSIS — F43.10 PTSD (POST-TRAUMATIC STRESS DISORDER): ICD-10-CM

## 2018-04-11 RX ORDER — PRAZOSIN HYDROCHLORIDE 1 MG/1
CAPSULE ORAL
Qty: 30 CAPSULE | Refills: 1 | OUTPATIENT
Start: 2018-04-11

## 2018-04-12 DIAGNOSIS — F43.10 PTSD (POST-TRAUMATIC STRESS DISORDER): ICD-10-CM

## 2018-04-12 DIAGNOSIS — F41.9 ANXIETY DISORDER, UNSPECIFIED TYPE: ICD-10-CM

## 2018-04-12 RX ORDER — HYDROXYZINE HYDROCHLORIDE 25 MG/1
25-50 TABLET, FILM COATED ORAL
Qty: 60 TABLET | Refills: 1 | Status: SHIPPED | OUTPATIENT
Start: 2018-04-12 | End: 2018-05-10

## 2018-04-14 RX ORDER — PRAZOSIN HYDROCHLORIDE 1 MG/1
1 CAPSULE ORAL AT BEDTIME
Qty: 90 CAPSULE | Refills: 1 | Status: SHIPPED | OUTPATIENT
Start: 2018-04-14 | End: 2018-04-24

## 2018-04-18 DIAGNOSIS — R56.9 SEIZURES (H): ICD-10-CM

## 2018-04-18 RX ORDER — LAMOTRIGINE 150 MG/1
150 TABLET ORAL DAILY
Qty: 30 TABLET | Refills: 1 | OUTPATIENT
Start: 2018-04-18

## 2018-04-19 NOTE — TELEPHONE ENCOUNTER
Called pharmacy and informed them the medication was refused. They will send the Rx to Dr. Herman

## 2018-04-23 DIAGNOSIS — R56.9 SEIZURES (H): ICD-10-CM

## 2018-04-23 RX ORDER — LAMOTRIGINE 150 MG/1
150 TABLET ORAL DAILY
Qty: 30 TABLET | Refills: 1 | Status: CANCELLED | OUTPATIENT
Start: 2018-04-23

## 2018-04-24 ENCOUNTER — OFFICE VISIT (OUTPATIENT)
Dept: FAMILY MEDICINE | Facility: CLINIC | Age: 28
End: 2018-04-24
Payer: COMMERCIAL

## 2018-04-24 ENCOUNTER — RECORDS - HEALTHEAST (OUTPATIENT)
Dept: ADMINISTRATIVE | Facility: OTHER | Age: 28
End: 2018-04-24

## 2018-04-24 VITALS
BODY MASS INDEX: 20.94 KG/M2 | WEIGHT: 113.8 LBS | OXYGEN SATURATION: 98 % | SYSTOLIC BLOOD PRESSURE: 114 MMHG | HEIGHT: 62 IN | DIASTOLIC BLOOD PRESSURE: 75 MMHG | RESPIRATION RATE: 16 BRPM | TEMPERATURE: 97.8 F | HEART RATE: 107 BPM

## 2018-04-24 DIAGNOSIS — F43.10 PTSD (POST-TRAUMATIC STRESS DISORDER): ICD-10-CM

## 2018-04-24 DIAGNOSIS — F32.3 SEVERE SINGLE CURRENT EPISODE OF MAJOR DEPRESSIVE DISORDER, WITH PSYCHOTIC FEATURES (H): Primary | ICD-10-CM

## 2018-04-24 DIAGNOSIS — F41.9 ANXIETY DISORDER, UNSPECIFIED TYPE: ICD-10-CM

## 2018-04-24 DIAGNOSIS — R56.9 SEIZURES (H): ICD-10-CM

## 2018-04-24 RX ORDER — LAMOTRIGINE 150 MG/1
150 TABLET ORAL DAILY
Qty: 30 TABLET | Refills: 0 | Status: SHIPPED | OUTPATIENT
Start: 2018-04-24 | End: 2018-06-27

## 2018-04-24 RX ORDER — RISPERIDONE 1 MG/1
1 TABLET ORAL DAILY
Qty: 30 TABLET | Refills: 0 | Status: SHIPPED | OUTPATIENT
Start: 2018-04-24 | End: 2018-05-10

## 2018-04-24 RX ORDER — PRAZOSIN HYDROCHLORIDE 1 MG/1
1 CAPSULE ORAL AT BEDTIME
Qty: 90 CAPSULE | Refills: 1 | Status: SHIPPED | OUTPATIENT
Start: 2018-04-24 | End: 2018-05-10

## 2018-04-24 RX ORDER — SERTRALINE HYDROCHLORIDE 100 MG/1
200 TABLET, FILM COATED ORAL DAILY
Qty: 90 TABLET | Refills: 3 | Status: CANCELLED | OUTPATIENT
Start: 2018-04-24

## 2018-04-24 RX ORDER — NORELGESTROMIN AND ETHINYL ESTRADIOL 35; 150 UG/MG; UG/MG
PATCH TRANSDERMAL
Qty: 9 PATCH | Refills: 0 | Status: CANCELLED | OUTPATIENT
Start: 2018-04-24

## 2018-04-24 NOTE — MR AVS SNAPSHOT
After Visit Summary   4/24/2018    Norman Watts    MRN: [id number]           Patient Information     Date Of Birth          1990        Visit Information        Provider Department      4/24/2018 9:00 AM Christal Emerson DO [facility]        Today's Diagnoses     Anxiety disorder, unspecified type        PTSD (post-traumatic stress disorder)        Encounter for initial prescription of transdermal patch hormonal contraceptive device        Seizures (H)          Care Instructions    Restart risperdal daily and prazosin at bedtime.    Return on Thursday to see your primary, Dr. Brizuela. If he is not available you can schedule with a different provider, but it would be better to be scheduled with him.    Make an appointment to see a psychiatrist - I have placed this referral.    If you have thoughts about self harm or if you just need additional support and care, here are some resources for you:    Crisis Lines:    [facility] Adult Crisis:  [phone]  [facility] Multilingual Crisis Line:  [phone]  [facility] Adult Crisis:  [phone]    You can also consider going to the [facility] at the following address.  Walk ins are welcome:    [address] [place] [address]   [phone] (for 24 hour crisis consultation)    Monday - Friday 8:00am - 7:00pm  Saturday:  11:00am - 3:00pm  Sunday and Holidays Closed    If you feel at risk of immediate harm, go directly to the Emergency Department.            Follow-ups after your visit        Who to contact     Please call your clinic at [phone] to:    Ask questions about your health    Make or cancel appointments    Discuss your medicines    Learn about your test results    Speak to your doctor            Additional Information About Your Visit        MyChart Information     [employer] gives you secure access to your electronic health record. If you see a primary care provider, you can also send  "messages to your care team and make appointments. If you have questions, please call your primary care clinic.  If you do not have a primary care provider, please call [phone] and they will assist you.      [employer] is an electronic gateway that provides easy, online access to your medical records. With [employer], you can request a clinic appointment, read your test results, renew a prescription or communicate with your care team.     To access your existing account, please contact your [facility] or call [phone] for assistance.        Care EveryWhere ID     This is your Care EveryWhere ID. This could be used by other organizations to access your [place] medical records  [id number]        Your Vitals Were     Pulse Temperature Respirations Height Pulse Oximetry BMI (Body Mass Index)    107 97.8  F (36.6  C) (Oral) 16 5' 2\" (157.5 cm) 98% 20.81 kg/m2       Blood Pressure from Last 3 Encounters:   04/24/18 114/75   03/23/18 114/71   03/12/18 108/66    Weight from Last 3 Encounters:   04/24/18 113 lb 12.8 oz (51.6 kg)   03/12/18 116 lb 6.4 oz (52.8 kg)   02/21/18 116 lb 3.2 oz (52.7 kg)              Today, you had the following     No orders found for display         Where to get your medicines      These medications were sent to [facility] - [place], [place] - [address], [place] [address]     Phone:  [phone]     lamoTRIgine 150 MG tablet    prazosin 1 MG capsule    risperiDONE 1 MG tablet          Primary Care Provider Office Phone # Fax #    Elvin DO Gelacio [phone]       [address] [address]        Equal Access to Services     FRITZ FLORES AH: Juan Manuel Childers, barb phillips, summer fontaine. So [facility] [phone].    ATENCIÓN: Si habla español, tiene a yarbrough disposición servicios gratuitos de asistencia lingüística. " Renata cheek [phone].    We comply with applicable federal civil rights laws and [place] laws. We do not discriminate on the basis of race, color, national origin, age, disability, sex, sexual orientation, or gender identity.            Thank you!     Thank you for choosing [facility]  for your care. Our goal is always to provide you with excellent care. Hearing back from our patients is one way we can continue to improve our services. Please take a few minutes to complete the written survey that you may receive in the mail after your visit with us. Thank you!             Your Updated Medication List - Protect others around you: Learn how to safely use, store and throw away your medicines at www.disposemymeds.org.          This list is accurate as of 4/24/18  9:55 AM.  Always use your most recent med list.                   Brand Name Dispense Instructions for use Diagnosis    cholecalciferol 1000 UNIT tablet    vitamin D3    100 tablet    Take 1 tablet (1,000 Units) by mouth daily    Vitamin D deficiency       folic acid 400 MCG tablet    FOLVITE    90 tablet    Take 2 tablets (800 mcg) by mouth daily    Seizures (H)       hydrOXYzine 25 MG tablet    ATARAX    60 tablet    Take 1-2 tablets (25-50 mg) by mouth nightly as needed    Insomnia due to other mental disorder       lamoTRIgine 150 MG tablet    LaMICtal    30 tablet    Take 1 tablet (150 mg) by mouth daily    Seizures (H)       prazosin 1 MG capsule    MINIPRESS    90 capsule    Take 1 capsule (1 mg) by mouth At Bedtime    Anxiety disorder, unspecified type, PTSD (post-traumatic stress disorder)       risperiDONE 1 MG tablet    risperDAL    30 tablet    Take 1 tablet (1 mg) by mouth daily    Anxiety disorder, unspecified type       sertraline 100 MG tablet    ZOLOFT    90 tablet    Take 2 tablets (200 mg) by mouth daily    Anxiety disorder, unspecified type

## 2018-04-24 NOTE — PROGRESS NOTES
Preceptor Attestation:   Patient seen, evaluated and discussed with the resident. I have verified the content of the note, which accurately reflects my assessment of the patient and the plan of care.   Supervising Physician:  David Stein MD

## 2018-04-24 NOTE — PROGRESS NOTES
"Subjective:    Norman Watts is a 28 year old female with a past medical history of seizures, major depression with psychotic features, anxiety disorder and chronic hepatitis B infection who presents for medication refill. She says ran out lamotrigine today and out of prazosin for a week. She says she sees neurology . Since we increased her lamotrigine a month ago, she reports decreased seizure frequency, only having about two seizures in the last month.     Her primary concern today is that her depression is worsening. Her [facility] worker is here with her today (Carrie Judd - phone number [phone]). Patient says she has been more emotional and is crying more. She has had some suicidal ideation, but denies any today. She does not have a clear plan to hurt herself, but says if she were going to do something she would just \"use something around her house\". She has started seeing images of her family members who have  and these hallucinations are disturbing to her. She says she is compliant with her medications, her  organizes them for her. We have referred her many times for psychiatry, but it doesn't appear she has been able to establish yet with a provider. Her [facility] worker says she does have a therapist through [facility]. She lives alone with her , who works during the day.     ROS:   General: Denies fevers, chills.  Skin: Denies new rashes or lesions.  GI: Denies N/V/D.  Psych: +Depression.    Objective:    /75 (BP Location: Left arm, Patient Position: Sitting, Cuff Size: Adult Regular)  Pulse 107  Temp 97.8  F (36.6  C) (Oral)  Resp 16  Ht 5' 2\" (157.5 cm)  Wt 113 lb 12.8 oz (51.6 kg)  SpO2 98%  BMI 20.81 kg/m2    Physical Exam:  General: NAD.  Skin: No rashes or lesions visualized.  HEENT: PERRLA, EOMI.  Lungs: Normal work of breathing.  Psych: Appears tearful and somewhat withdrawn, but does interact reasonably well.     Assessment/Plan:    Seizures: Will refill lamotrigine x " 30 days to get her to her neurology  appointment, but made clear that this needs to be refilled through her neurologist in the future. Encouraged to go to her appointment with neurology 4/24.     Depression: Although patient has some suicidal ideation, it seems primarily passive at this time. I did discuss evaluation in the ED, but patient does not want to go at this time. Will refill prazosin and will also restart Risperdal. Will have her follow very closely, returning before the end of the week to see either myself or her primary Dr. Brizuela. Given [facility] crisis numbers again. Will also place another referral for mental health to help her establish with a psychiatrist.     Christal Emerson, PGY 3  Family Medicine Resident  [facility]

## 2018-04-24 NOTE — PATIENT INSTRUCTIONS
Restart risperdal daily and prazosin at bedtime.    Return on Thursday to see your primary, Dr. Brizuela. If he is not available you can schedule with a different provider, but it would be better to be scheduled with him.    Make an appointment to see a psychiatrist - I have placed this referral.    If you have thoughts about self harm or if you just need additional support and care, here are some resources for you:    Crisis Lines:    [facility] Adult Crisis:  [phone]  [facility] Multilingual Crisis Line:  [phone]  [facility] Adult Crisis:  [phone]    You can also consider going to the [facility] at the following address.  Walk ins are welcome:    [address], [place]   [phone] (for 24 hour crisis consultation)    Monday - Friday 8:00am - 7:00pm  Saturday:  11:00am - 3:00pm  Sunday and Holidays Closed    If you feel at risk of immediate harm, go directly to the Emergency Department.      Called and left a message with Garcia to follow up on referral.   Ariela  04/25/18    Spoke with Garcia, they had made two attempts to contact patient with no response.    I have scheduled an appointment for patient at [facility].    [facility]  [address]  [place] [address]    Appointment  Date:5/25/18  Time: 7:45am    Please contact the above clinic if you need to cancel or reschedule. Feel free to contact me with any questions. Thanks!    Ariela  Care Coordinator  [phone]      Called patient via language line.  I have given her information.  Her insurance company does not provide rides for her.  She is going to ask her  to give her a ride. If not, she will ask her cousin.

## 2018-04-26 ENCOUNTER — TELEPHONE (OUTPATIENT)
Dept: FAMILY MEDICINE | Facility: CLINIC | Age: 28
End: 2018-04-26

## 2018-04-26 NOTE — TELEPHONE ENCOUNTER
----- Message from Christal Emerson, DO sent at 4/24/2018  5:29 PM CDT -----  Regarding: Patient appointment  Hey guys,    This patient is scheduled to see Dr. Brizuela 5/10, but really needs to be seen earlier then this because she is actively suicidal. Can we get her in before the end of the week with him (4/26 or 4/27 he has slots open)? Thank you.    Christal Emerson

## 2018-04-27 ENCOUNTER — RECORDS - HEALTHEAST (OUTPATIENT)
Dept: ADMINISTRATIVE | Facility: OTHER | Age: 28
End: 2018-04-27

## 2018-04-27 ENCOUNTER — RECORDS - HEALTHEAST (OUTPATIENT)
Dept: LAB | Facility: HOSPITAL | Age: 28
End: 2018-04-27

## 2018-04-27 ENCOUNTER — OFFICE VISIT (OUTPATIENT)
Dept: FAMILY MEDICINE | Facility: CLINIC | Age: 28
End: 2018-04-27
Payer: COMMERCIAL

## 2018-04-27 ENCOUNTER — TRANSFERRED RECORDS (OUTPATIENT)
Dept: HEALTH INFORMATION MANAGEMENT | Facility: CLINIC | Age: 28
End: 2018-04-27

## 2018-04-27 VITALS
OXYGEN SATURATION: 99 % | DIASTOLIC BLOOD PRESSURE: 63 MMHG | TEMPERATURE: 97.9 F | SYSTOLIC BLOOD PRESSURE: 98 MMHG | WEIGHT: 116.8 LBS | HEART RATE: 110 BPM | BODY MASS INDEX: 21.36 KG/M2

## 2018-04-27 DIAGNOSIS — F41.9 ANXIETY DISORDER, UNSPECIFIED TYPE: ICD-10-CM

## 2018-04-27 DIAGNOSIS — F32.3 SEVERE SINGLE CURRENT EPISODE OF MAJOR DEPRESSIVE DISORDER, WITH PSYCHOTIC FEATURES (H): Primary | ICD-10-CM

## 2018-04-27 LAB
ALBUMIN SERPL-MCNC: 4.1 G/DL (ref 3.5–5)
ALP SERPL-CCNC: 89 U/L (ref 45–120)
ALT SERPL W P-5'-P-CCNC: 19 U/L (ref 0–45)
ANION GAP SERPL CALCULATED.3IONS-SCNC: 12 MMOL/L (ref 5–18)
AST SERPL W P-5'-P-CCNC: 21 U/L (ref 0–40)
BILIRUB SERPL-MCNC: 0.5 MG/DL (ref 0–1)
BUN SERPL-MCNC: 10 MG/DL (ref 8–22)
CALCIUM SERPL-MCNC: 9.1 MG/DL (ref 8.5–10.5)
CHLORIDE BLD-SCNC: 103 MMOL/L (ref 98–107)
CO2 SERPL-SCNC: 27 MMOL/L (ref 22–31)
CREAT SERPL-MCNC: 0.76 MG/DL (ref 0.6–1.1)
GFR SERPL CREATININE-BSD FRML MDRD: >60 ML/MIN/1.73M2
GLUCOSE BLD-MCNC: 103 MG/DL (ref 70–125)
POTASSIUM BLD-SCNC: 3.9 MMOL/L (ref 3.5–5)
PROT SERPL-MCNC: 7.8 G/DL (ref 6–8)
SODIUM SERPL-SCNC: 142 MMOL/L (ref 136–145)
TSH SERPL DL<=0.005 MIU/L-ACNC: 2.32 UIU/ML (ref 0.3–5)
VIT B12 SERPL-MCNC: 777 PG/ML (ref 213–816)

## 2018-04-27 NOTE — MR AVS SNAPSHOT
After Visit Summary   4/27/2018    Norman Watts    MRN: [id number]           Patient Information     Date Of Birth          1990        Visit Information        Provider Department      4/27/2018 8:40 AM Elvin Brizuela DO [facility]        Care Instructions    - Please call 911 if thoughts of killing self  - Return next Friday  for 40min           Follow-ups after your visit        Who to contact     Please call your clinic at [phone] to:    Ask questions about your health    Make or cancel appointments    Discuss your medicines    Learn about your test results    Speak to your doctor            Additional Information About Your Visit        MyChart Information     [facility] gives you secure access to your electronic health record. If you see a primary care provider, you can also send messages to your care team and make appointments. If you have questions, please call your primary care clinic.  If you do not have a primary care provider, please call [phone] and they will assist you.      [facility] is an electronic gateway that provides easy, online access to your medical records. With [facility], you can request a clinic appointment, read your test results, renew a prescription or communicate with your care team.     To access your existing account, please contact your [facility] or call [phone] for assistance.        Care EveryWhere ID     This is your Care EveryWhere ID. This could be used by other organizations to access your [facility] medical records  [id number]        Your Vitals Were     Pulse Temperature Last Period Pulse Oximetry BMI (Body Mass Index)       110 97.9  F (36.6  C) (Oral) 04/21/2018 (Exact Date) 99% 21.36 kg/m2        Blood Pressure from Last 3 Encounters:   04/27/18 98/63   04/24/18 114/75   03/23/18 114/71    Weight from Last 3 Encounters:   04/27/18 116 lb 12.8 oz (53 kg)   04/24/18 113 lb 12.8 oz (51.6 kg)   03/12/18 116 lb 6.4 oz  (52.8 kg)              Today, you had the following     No orders found for display       Primary Care Provider Office Phone # Fax #    Elvin Brizuela [phone]       [address] [address]        Equal Access to Services     FRITZ FLORES : Hadii frandy nieves hadmichaelo Soomaali, waaxda luqadaha, qaybta kaalmada adeegyada, waxmacho friend maycolmikayla ramírezdivinejd esparza. So [facility] [phone].    ATENCIÓN: Si habla español, tiene a yarbrough disposición servicios gratuitos de asistencia lingüística. Llame al [phone].    We comply with applicable federal civil rights laws and [place] laws. We do not discriminate on the basis of race, color, national origin, age, disability, sex, sexual orientation, or gender identity.            Thank you!     Thank you for choosing [facility]  for your care. Our goal is always to provide you with excellent care. Hearing back from our patients is one way we can continue to improve our services. Please take a few minutes to complete the written survey that you may receive in the mail after your visit with us. Thank you!             Your Updated Medication List - Protect others around you: Learn how to safely use, store and throw away your medicines at www.disposemymeds.org.          This list is accurate as of 4/27/18  9:50 AM.  Always use your most recent med list.                   Brand Name Dispense Instructions for use Diagnosis    cholecalciferol 1000 UNIT tablet    vitamin D3    100 tablet    Take 1 tablet (1,000 Units) by mouth daily    Vitamin D deficiency       folic acid 400 MCG tablet    FOLVITE    90 tablet    Take 2 tablets (800 mcg) by mouth daily    Seizures (H)       hydrOXYzine 25 MG tablet    ATARAX    60 tablet    Take 1-2 tablets (25-50 mg) by mouth nightly as needed    Insomnia due to other mental disorder       lamoTRIgine 150 MG tablet    LaMICtal    30 tablet    Take 1 tablet (150 mg) by mouth daily    Seizures (H)        prazosin 1 MG capsule    MINIPRESS    90 capsule    Take 1 capsule (1 mg) by mouth At Bedtime    Anxiety disorder, unspecified type, PTSD (post-traumatic stress disorder)       risperiDONE 1 MG tablet    risperDAL    30 tablet    Take 1 tablet (1 mg) by mouth daily    Anxiety disorder, unspecified type       sertraline 100 MG tablet    ZOLOFT    90 tablet    Take 2 tablets (200 mg) by mouth daily    Anxiety disorder, unspecified type

## 2018-04-27 NOTE — PROGRESS NOTES
Preceptor Attestation:   Patient seen, evaluated and discussed with the resident. I have verified the content of the note, which accurately reflects my assessment of the patient and the plan of care.   Supervising Physician:  Julius Paz MD

## 2018-04-27 NOTE — PROGRESS NOTES
Family History   Problem Relation Age of Onset     DIABETES No family hx of      Coronary Artery Disease No family hx of      Other Cancer No family hx of      CANCER No family hx of      HEART DISEASE No family hx of      Social History     Social History     Marital status:      Spouse name: N/A     Number of children: N/A     Years of education: N/A     Social History Main Topics     Smoking status: Never Smoker     Smokeless tobacco: Never Used     Alcohol use No     Drug use: No     Sexual activity: Not Asked     Other Topics Concern     None     Social History Narrative     Nursing Notes:   Norman November, [facility]  4/27/2018  8:56 AM  Signed  Due to patient being non-English speaking/uses sign language, an  was used for this visit. Date and length of interpretation can be found on the scanned  worksheet.     name: Mary Jo De Jesus  Agency: [employer]  Language: Jennifer   Telephone number: [phone]  Type of interpretation: Face-to-face, spoken      Chief Complaint   Patient presents with     RECHECK     come here today to follow up from last visit on depression, seizure, and tireness per patient.      Medication Reconciliation     reviewed.      other     sometimes have heart burning per patient.      Blood pressure 98/63, pulse 110, temperature 97.9  F (36.6  C), temperature source Oral, weight 116 lb 12.8 oz (53 kg), last menstrual period 04/21/2018, SpO2 99 %, not currently breastfeeding.      S:  Patient reports that she is fieeling more sad, has thoughts of hurting self, Has thoughts of killing self that starts suddenly throughout the week. Has tried to kill self in the past month. Patient was thinking of killing self because of illness. Has shkaing, dizziness, headache. Says if she is about to kill herself, she will stab herself suddenly without telling anyone. Patient patient was initially hesitant about rosalia for safety.  However, I discussed this with the   who was present in the room.  We discussed sending the patient to the hospital for further treatment and workup however,  refused, due to bills incurred from last emergent hospitalization.   reports that he will be with patient during the whole week.  He also will have family members staying with the patient throughout the whole day to ensure that she does not commit suicide.  After hearing this, patient reports that she agrees not to hurt herself until she sees me again next week.  Of note, patient has an appointment with neurologist this afternoon.     O:  BP 98/63  Pulse 110  Temp 97.9  F (36.6  C) (Oral)  Wt 116 lb 12.8 oz (53 kg)  LMP 04/21/2018 (Exact Date)  SpO2 99%  BMI 21.36 kg/m2  General: Patient is on the chair, appears anxious  HEENT: Noted patient has cut hair very short.  She has been wearing a hat during her visits.  Patient reports that she feels like her hair is falling out  Heart: Regular rate and rhythm, no murmurs rubs or clicks  Lungs: Clear to auscultation bilaterally    A/p:  1. Severe single current episode of major depressive disorder, with psychotic features (H)  2. Anxiety disorder, unspecified type  Patient continues to be depressed.  Concerned the symptoms may be worsening.  Unknown if patient has stopped taking medication although she reports that she continues to take all medications as prescribed.  Patient's behavior is escalating.  She has shaved her head, and she now states that she is thinking of killing herself throughout the week.  I am very concerned that she has attempted this past month suddenly.  As well, patient reports that she will try to kill herself, she will not tell anybody.  I tried extensively to have patient be evaluated the hospital however,  was very concerned about financial issues concern with hospitalization.  After discussion with her , patient reports that she will contract for 1 week and not try to herself until seen again.   I also discussed with  to have patient be monitored at all times.   will bring family member over to ensure the safety of the patient.  I discussed with family to talk to our social work as they can help with the financial burden of hospitalization.   -Follow-up in 1 week  -At that time, discussed with social work options for hospital bill.  Patient needs to be seen by psychiatrist  -, Will discuss with referral desk  regarding Liana's appointment.    Elvin Brizuela  Family Medicine Resident PGY3

## 2018-04-27 NOTE — NURSING NOTE
Due to patient being non-English speaking/uses sign language, an  was used for this visit. Date and length of interpretation can be found on the scanned  worksheet.     name: Mary Jo Neal  Agency: [employer]  Language: Jennifer   Telephone number: [phone]  Type of interpretation: Face-to-face, spoken

## 2018-04-28 LAB — LAMOTRIGINE SERPL-MCNC: 4.6 UG/ML (ref 2.5–15)

## 2018-05-02 NOTE — PROGRESS NOTES
Called and left a message with [facility] to follow up on referral.   Ariela  04/25/18    Spoke with [facility], they had made two attempts to contact patient with no response.    I have scheduled an appointment for patient at [facility].    [facility]- [address]  [place] [address]    Appointment  Date:5/25/18  Time: 7:45am    Please contact the above clinic if you need to cancel or reschedule. Feel free to contact me with any questions. Thanks!    Ariela  Care Coordinator  [phone]      Called patient via language line.  I have given her information.  Her insurance company does not provide rides for her.  She is going to ask her  to give her a ride. If not, she will ask her cousin.

## 2018-05-03 NOTE — PROGRESS NOTES
Thank you for your persistance. Patient has an appointment with me tomorrow and it will really benefit her. I'll pass on the information with her and her  tomorrow.    Elvin Brizuela  Family Medicine Resident PGY3

## 2018-05-04 ENCOUNTER — TELEPHONE (OUTPATIENT)
Dept: FAMILY MEDICINE | Facility: CLINIC | Age: 28
End: 2018-05-04
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
## 2018-05-07 NOTE — PROGRESS NOTES
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    See below for some info that Dr. Hinton put together for us on this.  Ana Dsouza text for completing [facility]'s [employer] Public Transportation Exemption form:    1. Complete Exemption form located at:    [facility] R:\[facility] Staff\Behavioral Health\Therapy Resources\Administrative Forms\[facility] Public Transportation Expemption Form    2. Summary of Need section example language: \"The need for this exemption is based on a diagnosis of ** (ICD code **), and ** that was confirmed with our clinic's Behavioral Health diagnostic assessment intake process. The patient requires an exemption for all medical visits.\"    3. Fax completed form to [phone], ATTN: [employer]    4. Can call to follow-up about form's processing at [phone] (provider line) or [phone] ([employer])  "

## 2018-05-07 NOTE — PROGRESS NOTES
I'm not familiar with this exemption form or how that works.    Gogo, is this something you are familiar with or do you know about any restrictions to this?  Norman has the [employer] Plan that does not provide rides.   It sounds like her ability to get to the appointment without us providing a ride is really slim.

## 2018-05-09 ENCOUNTER — TELEPHONE (OUTPATIENT)
Dept: FAMILY MEDICINE | Facility: CLINIC | Age: 28
End: 2018-05-09

## 2018-05-09 DIAGNOSIS — F32.3 SEVERE SINGLE CURRENT EPISODE OF MAJOR DEPRESSIVE DISORDER, WITH PSYCHOTIC FEATURES (H): Primary | ICD-10-CM

## 2018-05-09 NOTE — PROGRESS NOTES
Karina from pathways counseling called me, patient appeared to have trouble taking and understanding medications, requests home health nurse to help set up appointment. Would like to coordinate Home RN visit same time as [facility] worker as can discuss meds with patient at same time. Home referral for medication management order placed.      Discussed with Karina concern of patient missing visit today and last week. She reports that patient appears to be doing fine. Patient would like to increase time between [place] clinic visit. I am agreeable to this as long as patient is seen by [facility] and Arleneor weekly (currently seen 2x/ week) and not actively suicidal as this was the main reason she was scheduled for weekly visits. Patient will also contract for safety with [employer] and Karina will drive Pt. To [employer] on May 25, 2018. Visit with me to be rescheduled in 1-2 weeks    Elvin Brizuela  Family Medicine Resident PGY3

## 2018-05-09 NOTE — PROGRESS NOTES
Referral and notes have been sent to [facility].  They will review and contact Karina to schedule.  Ariela  05/09/18

## 2018-05-09 NOTE — PROGRESS NOTES
Thanks for looking into this Ariela and Gogo.     I have talked directly to the [facility] worker, Karina ([phone]) and I discussed the natalis visit with her and importance of getting her connected with them for the psych assessment. She is trying to get a ride for the patient at this time.  She may need a follow - up call later in the week or early next week to ensure the ride has been set up. Ariela, Can you connect with Karina later to see if a ride has been set up or if there is anything they need on our end?    Elvin Brizuela  Family Medicine Resident PGY3

## 2018-05-10 ENCOUNTER — OFFICE VISIT (OUTPATIENT)
Dept: FAMILY MEDICINE | Facility: CLINIC | Age: 28
End: 2018-05-10
Payer: COMMERCIAL

## 2018-05-10 ENCOUNTER — TELEPHONE (OUTPATIENT)
Dept: OTHER | Facility: CLINIC | Age: 28
End: 2018-05-10

## 2018-05-10 VITALS
TEMPERATURE: 98.4 F | WEIGHT: 115 LBS | HEART RATE: 94 BPM | BODY MASS INDEX: 21.71 KG/M2 | HEIGHT: 61 IN | SYSTOLIC BLOOD PRESSURE: 112 MMHG | OXYGEN SATURATION: 97 % | DIASTOLIC BLOOD PRESSURE: 72 MMHG

## 2018-05-10 DIAGNOSIS — Z75.8 LANGUAGE BARRIER AFFECTING HEALTH CARE: Primary | ICD-10-CM

## 2018-05-10 DIAGNOSIS — E55.9 VITAMIN D DEFICIENCY: ICD-10-CM

## 2018-05-10 DIAGNOSIS — R56.9 SEIZURES (H): ICD-10-CM

## 2018-05-10 DIAGNOSIS — F99 INSOMNIA DUE TO OTHER MENTAL DISORDER: ICD-10-CM

## 2018-05-10 DIAGNOSIS — F43.10 PTSD (POST-TRAUMATIC STRESS DISORDER): ICD-10-CM

## 2018-05-10 DIAGNOSIS — F41.9 ANXIETY DISORDER, UNSPECIFIED TYPE: ICD-10-CM

## 2018-05-10 DIAGNOSIS — F51.05 INSOMNIA DUE TO OTHER MENTAL DISORDER: ICD-10-CM

## 2018-05-10 DIAGNOSIS — Z60.3 LANGUAGE BARRIER AFFECTING HEALTH CARE: Primary | ICD-10-CM

## 2018-05-10 RX ORDER — LANOLIN ALCOHOL/MO/W.PET/CERES
800 CREAM (GRAM) TOPICAL DAILY
Qty: 90 TABLET | Refills: 3 | Status: SHIPPED | OUTPATIENT
Start: 2018-05-10 | End: 2018-06-27

## 2018-05-10 RX ORDER — PRAZOSIN HYDROCHLORIDE 1 MG/1
1 CAPSULE ORAL AT BEDTIME
Qty: 90 CAPSULE | Refills: 1 | Status: SHIPPED | OUTPATIENT
Start: 2018-05-10 | End: 2018-06-27

## 2018-05-10 RX ORDER — RISPERIDONE 1 MG/1
1 TABLET ORAL DAILY
Qty: 30 TABLET | Refills: 0 | Status: SHIPPED | OUTPATIENT
Start: 2018-05-10 | End: 2018-06-15

## 2018-05-10 RX ORDER — SERTRALINE HYDROCHLORIDE 100 MG/1
200 TABLET, FILM COATED ORAL DAILY
Qty: 90 TABLET | Refills: 3 | Status: SHIPPED | OUTPATIENT
Start: 2018-05-10 | End: 2018-06-27

## 2018-05-10 RX ORDER — HYDROXYZINE HYDROCHLORIDE 25 MG/1
25-50 TABLET, FILM COATED ORAL
Qty: 60 TABLET | Refills: 1 | Status: SHIPPED | OUTPATIENT
Start: 2018-05-10 | End: 2018-06-27

## 2018-05-10 SDOH — SOCIAL STABILITY - SOCIAL INSECURITY: ACCULTURATION DIFFICULTY: Z60.3

## 2018-05-10 NOTE — PROGRESS NOTES
"  Family History   Problem Relation Age of Onset     DIABETES No family hx of      Coronary Artery Disease No family hx of      Other Cancer No family hx of      CANCER No family hx of      HEART DISEASE No family hx of      Social History     Social History     Marital status:      Spouse name: N/A     Number of children: N/A     Years of education: N/A     Social History Main Topics     Smoking status: Never Smoker     Smokeless tobacco: Never Used     Alcohol use No     Drug use: No     Sexual activity: Not Asked     Other Topics Concern     None     Social History Narrative       There are no exam notes on file for this visit.  Chief Complaint   Patient presents with     Depression     recheck depression medication     Blood pressure 112/72, pulse 94, temperature 98.4  F (36.9  C), temperature source Oral, height 5' 1.02\" (155 cm), weight 115 lb (52.2 kg), last menstrual period 04/21/2018, SpO2 97 %, not currently breastfeeding.      S:  Patient is here for follow up of depression. She reports that she continues to feel scared at night. She reports that she she continues to feel scared at night, when alone. Does not feel scared when around other people. Takes medications as prescribed, in discussion with Kairna, patient has trouble taking correct mediation. Discussed that we have a home health nurse to coming out to help with set up and she is agreeable to this. No thoughts of hurting self or others.    Patient would like to extend weekly visits until because the family clinic.  Noted that patient's counselor Rasta visits her weekly or biweekly.  We are also keeping close contacts with her and regularly follows me with updates.  I agreed to this and patient agreed to go to emergency room or to her counselors if she is feeling sad or suicidal.      O:  /72 (BP Location: Right arm, Patient Position: Sitting, Cuff Size: Adult Regular)  Pulse 94  Temp 98.4  F (36.9  C) (Oral)  Ht 5' 1.02\" (155 " cm)  Wt 115 lb (52.2 kg)  LMP 04/21/2018 (Exact Date)  SpO2 97%  BMI 21.71 kg/m2  General: On Chair, no acute distress  HEENT: No conjunctivitis, EOM grossly intact,   Heart: RRR, no murmurs, rubs, or clicks  Lungs: CTA all fields, no wheeze, no crackles, no respiratory distress  Abd: Soft, non tender, not distended, no guarding, no rebound, normoactive bowel sounds,   Extremites: No edema, no lesions noted, pulses present  Skin: No lesions, no edema, excorations, or rashes noted    A/P:  1. Seizures (H)  Refilled as per request. Getting folic acid to minimize teratogenic effects of antiseizure medications.  - folic acid (FOLVITE) 400 MCG tablet; Take 2 tablets (800 mcg) by mouth daily  Dispense: 90 tablet; Refill: 3    2. Insomnia due to other mental disorder  Refilled as per request.  - hydrOXYzine (ATARAX) 25 MG tablet; Take 1-2 tablets (25-50 mg) by mouth nightly as needed  Dispense: 60 tablet; Refill: 1    3. Anxiety disorder, unspecified type  Stable.  Patient is a gradient max dosage of Zoloft, is already having risperidone.  Patient has an appointment with [facility] in 2 weeks, at that time, medications for anxiety, major depressive disorder, will managed by them. Also discussed with pt. Weekly/ biweekly check up with [facility] worker and councellor as she hoped to decrease weekly visits to clinic. As patient is deemed stable, NO SI, and has frequent follow-up, I agreed with plan. Pt. To f/u with [facility] in 3 - 4 weeks. Discussed going to ER if feel unstable suicidal or feeling unwell  - prazosin (MINIPRESS) 1 MG capsule; Take 1 capsule (1 mg) by mouth At Bedtime  Dispense: 90 capsule; Refill: 1  - sertraline (ZOLOFT) 100 MG tablet; Take 2 tablets (200 mg) by mouth daily  Dispense: 90 tablet; Refill: 3  - risperiDONE (RISPERDAL) 1 MG tablet; Take 1 tablet (1 mg) by mouth daily  Dispense: 30 tablet; Refill: 0    4. PTSD (post-traumatic stress disorder)  Refilled as per request  - prazosin (MINIPRESS) 1 MG capsule;  Take 1 capsule (1 mg) by mouth At Bedtime  Dispense: 90 capsule; Refill: 1    5. Vitamin D deficiency  Refilled as per request  - cholecalciferol (VITAMIN D3) 1000 UNIT tablet; Take 1 tablet (1,000 Units) by mouth daily  Dispense: 100 tablet; Refill: 3    Social work, Gogo, was also invited to the room to discuss bills with the patient.  Patient is noted to have an $1800 bill due for [facility] for an MRI despite having insurance at that time.   not needed at this time.    Elvin Brizuela  Family Medicine Resident PGY3

## 2018-05-10 NOTE — PROGRESS NOTES
Spoke with Karina to follow up. She is going to be picking Paw up for her appointment and bringing her. She has also increased her days of seeing her to two days per week and plans to continue to provide transportation for her and also will teach her about the bus system as well.    Ariela  05/10/18

## 2018-05-10 NOTE — PROGRESS NOTES
Preceptor Attestation:   Patient seen, evaluated and discussed with the resident. I have verified the content of the note, which accurately reflects my assessment of the patient and the plan of care.   Supervising Physician:  Buck Olivo MD

## 2018-05-10 NOTE — PROGRESS NOTES
"SW did some follow up on the transportation benefits from the insurance plan. This is a note SW entered in February 2018:    \"SW reached out to patient's health insurance with [employer]. She does not have eligibility for rides due to her plan being a [place] Care Plan. [employer] Rep the only way to change to a PMAP plan with them is to reapply if, and only if, she has had a decrease in household income. Other options mentioned by the rep were to reach out to [facility] financial worker/ to request mileage reimbursement options (forms required), seek out volunteer transportation agencies, or utilize public transportation.\"    Called [employer] again to verify and the coverage remains the same. They suggested that Norman requests a change in her insurance plan with her financial worker, however, the transportation would only be included if there was a significant enough income decline in the household to switch her from a [place] Care Plan to a PMAP (Prepaid Medical Assistance Plan).     [facility] workers do have some designation to provide transportation to mental health related appointments or meetings- as indicated under the 'Basic Living and Social Skills' on the [place] DHS web site. Having Karina assist with that transportation to the appointments would be the most appropriate intervention at this point. SHARI called Karina this afternoon and discussed the on-going transportation with her as an option. Due to the appointments being spaced out she feels she has the capacity to provide the transportation on long-term basis. She will reach out the [facility] if it becomes an issue in the future and other transportation options need to be looked at furthers. Options are limited if available at all.       Gogo Hernandez, YO      "

## 2018-05-10 NOTE — MR AVS SNAPSHOT
"              After Visit Summary   5/10/2018    Norman Watts    MRN: [id number]           Patient Information     Date Of Birth          1990        Visit Information        Provider Department      5/10/2018 3:10 PM Elvin Brizuela DO [facility]        Today's Diagnoses     Language barrier affecting health care    -  1    Seizures (H)        Insomnia due to other mental disorder        Anxiety disorder, unspecified type        PTSD (post-traumatic stress disorder)        Vitamin D deficiency           Follow-ups after your visit        Who to contact     Please call your clinic at [phone] to:    Ask questions about your health    Make or cancel appointments    Discuss your medicines    Learn about your test results    Speak to your doctor            Additional Information About Your Visit        twtrlandharEverest Information     [employer] gives you secure access to your electronic health record. If you see a primary care provider, you can also send messages to your care team and make appointments. If you have questions, please call your primary care clinic.  If you do not have a primary care provider, please call [phone] and they will assist you.      [employer] is an electronic gateway that provides easy, online access to your medical records. With [employer], you can request a clinic appointment, read your test results, renew a prescription or communicate with your care team.     To access your existing account, please contact your [facility] or call [phone] for assistance.        Care EveryWhere ID     This is your Care EveryWhere ID. This could be used by other organizations to access your [place] medical records  [id number]        Your Vitals Were     Pulse Temperature Height Last Period Pulse Oximetry BMI (Body Mass Index)    94 98.4  F (36.9  C) (Oral) 5' 1.02\" (155 cm) 04/21/2018 (Exact Date) 97% 21.71 kg/m2       Blood Pressure from Last 3 Encounters:   05/10/18 " 112/72   04/27/18 98/63   04/24/18 114/75    Weight from Last 3 Encounters:   05/10/18 115 lb (52.2 kg)   04/27/18 116 lb 12.8 oz (53 kg)   04/24/18 113 lb 12.8 oz (51.6 kg)              We Performed the Following      : Sign Language or Oral - 38-52 minutes          Where to get your medicines      These medications were sent to [facility] - [place], [place] - [address], [place] [address]     Phone:  [phone]     cholecalciferol 1000 UNIT tablet    folic acid 400 MCG tablet    hydrOXYzine 25 MG tablet    prazosin 1 MG capsule    risperiDONE 1 MG tablet    sertraline 100 MG tablet          Primary Care Provider Office Phone # Fax #    Elvin Brizuela  [phone]       [address] [address]        Equal Access to Services     FRITZ FLROES : Hadii aad ku hadasho Soaram, waaxda luqadaha, qaybta kaalmada adeegyada, summer esparza. So [facility] [phone].    ATENCIÓN: Si habla español, tiene a yarbrough disposición servicios gratuitos de asistencia lingüística. Renata al [phone].    We comply with applicable federal civil rights laws and [place] laws. We do not discriminate on the basis of race, color, national origin, age, disability, sex, sexual orientation, or gender identity.            Thank you!     Thank you for choosing [facility]  for your care. Our goal is always to provide you with excellent care. Hearing back from our patients is one way we can continue to improve our services. Please take a few minutes to complete the written survey that you may receive in the mail after your visit with us. Thank you!             Your Updated Medication List - Protect others around you: Learn how to safely use, store and throw away your medicines at www.disposemymeds.org.          This list is accurate as of 5/10/18 11:59 PM.  Always use your most recent med list.                   Brand Name  Dispense Instructions for use Diagnosis    cholecalciferol 1000 UNIT tablet    vitamin D3    100 tablet    Take 1 tablet (1,000 Units) by mouth daily    Vitamin D deficiency       folic acid 400 MCG tablet    FOLVITE    90 tablet    Take 2 tablets (800 mcg) by mouth daily    Seizures (H)       hydrOXYzine 25 MG tablet    ATARAX    60 tablet    Take 1-2 tablets (25-50 mg) by mouth nightly as needed    Insomnia due to other mental disorder       lamoTRIgine 150 MG tablet    LaMICtal    30 tablet    Take 1 tablet (150 mg) by mouth daily    Seizures (H)       prazosin 1 MG capsule    MINIPRESS    90 capsule    Take 1 capsule (1 mg) by mouth At Bedtime    Anxiety disorder, unspecified type, PTSD (post-traumatic stress disorder)       risperiDONE 1 MG tablet    risperDAL    30 tablet    Take 1 tablet (1 mg) by mouth daily    Anxiety disorder, unspecified type       sertraline 100 MG tablet    ZOLOFT    90 tablet    Take 2 tablets (200 mg) by mouth daily    Anxiety disorder, unspecified type

## 2018-05-11 ENCOUNTER — RECORDS - HEALTHEAST (OUTPATIENT)
Dept: ADMINISTRATIVE | Facility: OTHER | Age: 28
End: 2018-05-11

## 2018-05-11 ASSESSMENT — PATIENT HEALTH QUESTIONNAIRE - PHQ9: SUM OF ALL RESPONSES TO PHQ QUESTIONS 1-9: 17

## 2018-05-12 ENCOUNTER — HOME CARE/HOSPICE - HEALTHEAST (OUTPATIENT)
Dept: HOME HEALTH SERVICES | Facility: HOME HEALTH | Age: 28
End: 2018-05-12

## 2018-05-14 ENCOUNTER — HOME CARE/HOSPICE - HEALTHEAST (OUTPATIENT)
Dept: HOME HEALTH SERVICES | Facility: HOME HEALTH | Age: 28
End: 2018-05-14

## 2018-05-15 ENCOUNTER — HOME CARE/HOSPICE - HEALTHEAST (OUTPATIENT)
Dept: HOME HEALTH SERVICES | Facility: HOME HEALTH | Age: 28
End: 2018-05-15

## 2018-05-23 ENCOUNTER — OFFICE VISIT (OUTPATIENT)
Dept: FAMILY MEDICINE | Facility: CLINIC | Age: 28
End: 2018-05-23
Payer: COMMERCIAL

## 2018-05-23 VITALS
RESPIRATION RATE: 20 BRPM | OXYGEN SATURATION: 97 % | HEIGHT: 61 IN | SYSTOLIC BLOOD PRESSURE: 94 MMHG | TEMPERATURE: 98.3 F | WEIGHT: 113.4 LBS | HEART RATE: 97 BPM | DIASTOLIC BLOOD PRESSURE: 59 MMHG | BODY MASS INDEX: 21.41 KG/M2

## 2018-05-23 DIAGNOSIS — F43.10 PTSD (POST-TRAUMATIC STRESS DISORDER): ICD-10-CM

## 2018-05-23 DIAGNOSIS — R56.9 SEIZURES (H): ICD-10-CM

## 2018-05-23 DIAGNOSIS — F41.9 ANXIETY DISORDER, UNSPECIFIED TYPE: ICD-10-CM

## 2018-05-23 RX ORDER — PRAZOSIN HYDROCHLORIDE 1 MG/1
1 CAPSULE ORAL AT BEDTIME
Qty: 90 CAPSULE | Refills: 1 | Status: CANCELLED | OUTPATIENT
Start: 2018-05-23

## 2018-05-23 RX ORDER — LAMOTRIGINE 150 MG/1
150 TABLET ORAL DAILY
Qty: 30 TABLET | Refills: 0 | Status: CANCELLED | OUTPATIENT
Start: 2018-05-23

## 2018-05-23 RX ORDER — RISPERIDONE 1 MG/1
1 TABLET ORAL DAILY
Qty: 30 TABLET | Refills: 0 | Status: CANCELLED | OUTPATIENT
Start: 2018-05-23

## 2018-05-23 ASSESSMENT — ANXIETY QUESTIONNAIRES
GAD7 TOTAL SCORE: 5
3. WORRYING TOO MUCH ABOUT DIFFERENT THINGS: SEVERAL DAYS
7. FEELING AFRAID AS IF SOMETHING AWFUL MIGHT HAPPEN: SEVERAL DAYS
1. FEELING NERVOUS, ANXIOUS, OR ON EDGE: SEVERAL DAYS
2. NOT BEING ABLE TO STOP OR CONTROL WORRYING: NOT AT ALL
6. BECOMING EASILY ANNOYED OR IRRITABLE: SEVERAL DAYS
5. BEING SO RESTLESS THAT IT IS HARD TO SIT STILL: SEVERAL DAYS

## 2018-05-23 ASSESSMENT — PATIENT HEALTH QUESTIONNAIRE - PHQ9: 5. POOR APPETITE OR OVEREATING: NOT AT ALL

## 2018-05-23 NOTE — PROGRESS NOTES
Preceptor Attestation:   Patient seen, evaluated and discussed with the resident. I have verified the content of the note, which accurately reflects my assessment of the patient and the plan of care.   Supervising Physician:  Nereida Moralez MD

## 2018-05-23 NOTE — NURSING NOTE
Due to patient being non-English speaking/uses sign language, an  was used for this visit. Only for face-to-face interpretation by an external agency, date and length of interpretation can be found on the scanned worksheet.     name: Juan BARNES  Agency: [employer]  Language: Jennifer   Telephone number: [phone]  Type of interpretation: Face-to-face, spoken

## 2018-05-23 NOTE — MR AVS SNAPSHOT
"              After Visit Summary   5/23/2018    Norman Watts    MRN: [id number]           Patient Information     Date Of Birth          1990        Visit Information        Provider Department      5/23/2018 1:30 PM Arielle Hughes MD [facility]        Today's Diagnoses     Anxiety disorder, unspecified type        Seizures (H)        PTSD (post-traumatic stress disorder)           Follow-ups after your visit        Follow-up notes from your care team     Return in about 3 months (around 8/23/2018) for complex medical care.      Who to contact     Please call your clinic at [phone] to:    Ask questions about your health    Make or cancel appointments    Discuss your medicines    Learn about your test results    Speak to your doctor            Additional Information About Your Visit        Mertadohart Information     [employer] gives you secure access to your electronic health record. If you see a primary care provider, you can also send messages to your care team and make appointments. If you have questions, please call your primary care clinic.  If you do not have a primary care provider, please call [phone] and they will assist you.      [employer] is an electronic gateway that provides easy, online access to your medical records. With [employer], you can request a clinic appointment, read your test results, renew a prescription or communicate with your care team.     To access your existing account, please contact your [facility] or call [phone] for assistance.        Care EveryWhere ID     This is your Care EveryWhere ID. This could be used by other organizations to access your [place] medical records  [id number]        Your Vitals Were     Pulse Temperature Respirations Height Pulse Oximetry BMI (Body Mass Index)    97 98.3  F (36.8  C) (Oral) 20 5' 0.75\" (154.3 cm) 97% 21.6 kg/m2       Blood Pressure from Last 3 Encounters:   05/23/18 94/59   05/10/18 112/72 "   04/27/18 98/63    Weight from Last 3 Encounters:   05/23/18 113 lb 6.4 oz (51.4 kg)   05/10/18 115 lb (52.2 kg)   04/27/18 116 lb 12.8 oz (53 kg)              Today, you had the following     No orders found for display       Primary Care Provider Office Phone # Fax #    Elvin Brizuela [phone]       [address] [address]        Equal Access to Services     FRITZ FLORES : Hadii frandy ku hadasho Soomaali, waaxda luqadaha, qaybta kaalmada adeegyada, waxay rigoin hayfelixn abdiel esparza. So [facility] [phone].    ATENCIÓN: Si habla español, tiene a yarbrough disposición servicios gratuitos de asistencia lingüística. Llame al [phone].    We comply with applicable federal civil rights laws and [place] laws. We do not discriminate on the basis of race, color, national origin, age, disability, sex, sexual orientation, or gender identity.            Thank you!     Thank you for choosing [facility]  for your care. Our goal is always to provide you with excellent care. Hearing back from our patients is one way we can continue to improve our services. Please take a few minutes to complete the written survey that you may receive in the mail after your visit with us. Thank you!             Your Updated Medication List - Protect others around you: Learn how to safely use, store and throw away your medicines at www.disposemymeds.org.          This list is accurate as of 5/23/18 11:59 PM.  Always use your most recent med list.                   Brand Name Dispense Instructions for use Diagnosis    cholecalciferol 1000 UNIT tablet    vitamin D3    100 tablet    Take 1 tablet (1,000 Units) by mouth daily    Vitamin D deficiency       folic acid 400 MCG tablet    FOLVITE    90 tablet    Take 2 tablets (800 mcg) by mouth daily    Seizures (H)       hydrOXYzine 25 MG tablet    ATARAX    60 tablet    Take 1-2 tablets (25-50 mg) by mouth nightly as needed    Insomnia due to  other mental disorder       lamoTRIgine 150 MG tablet    LaMICtal    30 tablet    Take 1 tablet (150 mg) by mouth daily    Seizures (H)       prazosin 1 MG capsule    MINIPRESS    90 capsule    Take 1 capsule (1 mg) by mouth At Bedtime    Anxiety disorder, unspecified type, PTSD (post-traumatic stress disorder)       risperiDONE 1 MG tablet    risperDAL    30 tablet    Take 1 tablet (1 mg) by mouth daily    Anxiety disorder, unspecified type       sertraline 100 MG tablet    ZOLOFT    90 tablet    Take 2 tablets (200 mg) by mouth daily    Anxiety disorder, unspecified type

## 2018-05-23 NOTE — PROGRESS NOTES
"S: Norman Watts is a 28 year old female with a PMH of   Patient Active Problem List   Diagnosis     Seizures (H)     Chronic viral hepatitis B without delta agent and without coma (H)     Anxiety disorder, unspecified type     Depression     Unspecified Anxiety Disorder     Severe single current episode of major depressive disorder, with psychotic features (H)     presenting to clinic today with a chief complaint of \"medication refill\". She has been stable on these dosages for a few months. She reports a seizure yesterday where she did end up biting her lip.  Patient reports she is supposed to follow-up with [facility] on Friday. She has her  at home, a nurse to refill medications.  Patient is unsure when she has post follow-up neurology next is unaware of when her medications will be sent per neurology.      ROS:  General: No fevers, chills  Head: No headache    Current Outpatient Prescriptions   Medication Sig Dispense Refill     cholecalciferol (VITAMIN D3) 1000 UNIT tablet Take 1 tablet (1,000 Units) by mouth daily 100 tablet 3     folic acid (FOLVITE) 400 MCG tablet Take 2 tablets (800 mcg) by mouth daily 90 tablet 3     hydrOXYzine (ATARAX) 25 MG tablet Take 1-2 tablets (25-50 mg) by mouth nightly as needed 60 tablet 1     lamoTRIgine (LAMICTAL) 150 MG tablet Take 1 tablet (150 mg) by mouth daily 30 tablet 0     prazosin (MINIPRESS) 1 MG capsule Take 1 capsule (1 mg) by mouth At Bedtime 90 capsule 1     risperiDONE (RISPERDAL) 1 MG tablet Take 1 tablet (1 mg) by mouth daily 30 tablet 0     sertraline (ZOLOFT) 100 MG tablet Take 2 tablets (200 mg) by mouth daily 90 tablet 3       O: BP 94/59 (BP Location: Left arm, Patient Position: Sitting, Cuff Size: Adult Regular)  Pulse 97  Temp 98.3  F (36.8  C) (Oral)  Resp 20  Ht 5' 0.75\" (154.3 cm)  Wt 113 lb 6.4 oz (51.4 kg)  SpO2 97%  BMI 21.6 kg/m2   Gen:  Well nourished and in No acute distress   Psych: Euthymic      Assessment and Plan:  Norman was seen today " for follow up for and refill request.    Diagnoses and all orders for this visit:    Anxiety disorder, unspecified type    Seizures (H)    PTSD (post-traumatic stress disorder)    Comment: After discussion with the patient and her worker and appears patient already has medications at the pharmacy which patient was unaware of.  I personally called pharmacy to determine which medications were refilled which were all of patient's medications that she was requesting refills on today.  As per previous plan patient's Lamictal will be refilled by neurology.  Patient's psychiatric medications will likely need to be refilled in the future by her provider at [facility].  Plan for patient follow-up in 3 months to check in with her regular provider and determine if further resources are needed.    This patient was seen and discussed with Dr. Moralez who agrees with the assessment and plan.     Arielle Hughes,   PGY2

## 2018-05-24 ENCOUNTER — HOME CARE/HOSPICE - HEALTHEAST (OUTPATIENT)
Dept: HOME HEALTH SERVICES | Facility: HOME HEALTH | Age: 28
End: 2018-05-24

## 2018-05-24 ASSESSMENT — ANXIETY QUESTIONNAIRES: GAD7 TOTAL SCORE: 5

## 2018-05-24 ASSESSMENT — PATIENT HEALTH QUESTIONNAIRE - PHQ9: SUM OF ALL RESPONSES TO PHQ QUESTIONS 1-9: 4

## 2018-05-25 NOTE — NURSING NOTE
Due to patient being non-English speaking/uses sign language, an  was used for this visit. Only for face-to-face interpretation by an external agency, date and length of interpretation can be found on the scanned worksheet.     name: Erin Hernandez  Agency: [employer]  Language: Jennifer   Telephone number: [phone]  Type of interpretation: Face-to-face, spoken

## 2018-05-31 ENCOUNTER — HOME CARE/HOSPICE - HEALTHEAST (OUTPATIENT)
Dept: HOME HEALTH SERVICES | Facility: HOME HEALTH | Age: 28
End: 2018-05-31

## 2018-06-07 ENCOUNTER — HOME CARE/HOSPICE - HEALTHEAST (OUTPATIENT)
Dept: HOME HEALTH SERVICES | Facility: HOME HEALTH | Age: 28
End: 2018-06-07

## 2018-06-12 ENCOUNTER — DOCUMENTATION ONLY (OUTPATIENT)
Dept: FAMILY MEDICINE | Facility: CLINIC | Age: 28
End: 2018-06-12

## 2018-06-12 NOTE — PROGRESS NOTES
Interprofessional Team Consultation Note     Requesting Provider: Dr. Brizuela    Consultants:  Behavioral Health: Dr. Jordan  Care Coordination: Julia Whitman Cynthia  PharmD: Dr. Harmon, pharmacy student, [facility] Medicine Physicians: Dr. Brizuela only    IDENTIFYING DATA/REASON FOR REFERRAL:  Norman Watts is 28 year old female who is cared for by Dr. Brizuela.? Dr. Brizuela is requesting consultation related to transition of care plan for patient with complex medical and mental health concerns. ?Relevant clinical information obtained from requesting PCP, interprofessional team members noted above and review of the medical record. ???    Topics Discussed:  1.  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 Borderline personality disorder (often threatens suicide if  goes to work when she wants him to stay home or they have a fight, etc.).  Met with Dr. Lombardi for consult in past when in crisis.  She was sent to [facility] for suicidal ideation.  Had large deductible from last hospitalization which makes family resistant to seeking care at the hospital in the future.  Currently seeing [facility] worker (Carrie, who is great per Gogo) and therapist (Vanessa) via [facility] twice per week.  Both are on the care team.  Trying to connect her to psychiatry at [facility].  Missed visit in November and she will not be able to re-schedule if she misses another.  Does not have transportation as a benefit via insurance.  [employer] worker has agreed to drive her to this appt.  Continues to experience suicidal ideation.  Dr. Brizuela has called crisis team for her in the past.  Worried that she may fall through the cracks in the transition from one provider to a new provider.  Has had insurance lapses in the past.  Discussed strategies to mitigate this risk.  2.  Seizure disorder:  Often  "\"shakes\" at night.  Not clear if these are true seizures or pseudoseizures - taking Lamotragine to address seizures/mental health.  3.  Pregnancy Hopes:  Wants to get pregnant, but team worries this could be overwhelming given co-morbid medical, mental health and current medications. Recently saw maternal/fetal medicine so team is wondering if she is actively trying to get pregnant despite Dr. Brizuela' recommendation that she hold off on pregnancy at this time.    Recommendations/Action Items:  1.  Transferring care to Dr. Lees after Dr. Brizuela graduates at the end of the month.  Dr. Brizuela and Dr. Lees have had a meeting to discuss this handoff.  Julia will reach out to patient to set up visit with Dr. Lees in the next month or so.  2.  Recommended that this patient may benefit from referral to mental health case management.  Dr. Brizuela will call therapist, Vanessa, at [facility], to see if they have considered.referral to mental health case management.  If a provider at [facility] cannot make the mental health referral, could follow up with psychiatrist at [facility] to see if they would do this.   3.  uJlia will confirm if she attended visit with psychiatry at [facility] when she calls to set up visit with Dr. Lees.  Will plan to obtain OSCAR for [facility] so we can get records if she did attend that visit and coordinate with psychiatry going forward (added to snapshot today).  If she missed visit, we can look into other community resources for psychiatry or consider scheduling PCT visit with Dr. Hernandez or Dr. Riddle at [facility].  4.  Antonette will check with [facility] and call maternal/fetal medicine to see if she is pregnant.  If she is pregnant or becomes pregnant in the near future, will monitor closely for de-stabilization and discuss risks and benefits of current medication regimen in the context of pregnancy.    Ana Jordan, PhD., LP     Disclaimer  The above treatment recommendations are based " on consultation with the patient's primary care provider and a review of relevant information in EPIC.? I have not personally examined the patient.? All recommendations should be implemented with considerations of the patient's relevant prior history and current clinical status.  Please contact me with any questions about the care of this patient.

## 2018-06-13 NOTE — PROGRESS NOTES
Called MFM and discussed with Ariela.  Found that the order for MFM was placed in the wrong chart.  She will have this order removed from this chart and placed in the correct pt's chart.  Reviewed in [facility] and pt was last seen by her HHN on 6/7/18.  Per HHN note pt is taking her meds as ordered and requesting refills when needed.  She reported feeling achy all over but was attributing it to her depression.  Patient told the HHN that she has some depression due to being new to [place] and does not know anyone.  HHN encouraged her to go out in the community to events to meet other people.  Routed note to Dr Brizuela and Dr Lees./JESSICA

## 2018-06-14 ENCOUNTER — HOME CARE/HOSPICE - HEALTHEAST (OUTPATIENT)
Dept: HOME HEALTH SERVICES | Facility: HOME HEALTH | Age: 28
End: 2018-06-14

## 2018-06-15 DIAGNOSIS — F41.9 ANXIETY DISORDER, UNSPECIFIED TYPE: ICD-10-CM

## 2018-06-15 RX ORDER — RISPERIDONE 1 MG/1
1 TABLET ORAL DAILY
Qty: 30 TABLET | Refills: 0 | Status: SHIPPED | OUTPATIENT
Start: 2018-06-15 | End: 2018-06-27

## 2018-06-18 NOTE — PROGRESS NOTES
Carrie Judd was called to see if patient is able to get set up with mental health coordinator. Kalin is unable to do this but has worked with [facility] in the past to enable previous patient to get one. She will discuss with [facility] team to get patient set up with one and thinks patient is a good candidate. She will update Dr. Lees with progress at next visit. She would like a phone call ([phone]) when patient's next appointment is so she can coordinate bringing patient there.    We also discussed appointment with [facility]. Patient was scheduled for psych intake on Friday 6/15/18 and patient refused to go the morning of the appointment as per Karina. [facility] has informed Karina that patient will likely be discharged from its services. Carrie also expressed concern that the patient may want to stop taking medications. We discussed that the patient should follow up with us in the next few weeks to meet with Dr. Lees and discuss future plans.    Elvin Brizuela  Family Medicine Resident PGY3

## 2018-06-21 ENCOUNTER — HOME CARE/HOSPICE - HEALTHEAST (OUTPATIENT)
Dept: HOME HEALTH SERVICES | Facility: HOME HEALTH | Age: 28
End: 2018-06-21

## 2018-06-22 ENCOUNTER — HOME CARE/HOSPICE - HEALTHEAST (OUTPATIENT)
Dept: HOME HEALTH SERVICES | Facility: HOME HEALTH | Age: 28
End: 2018-06-22

## 2018-06-25 PROBLEM — Z30.8 ENCOUNTER FOR OTHER CONTRACEPTIVE MANAGEMENT: Status: ACTIVE | Noted: 2018-06-25

## 2018-06-25 PROBLEM — Z30.8 ENCOUNTER FOR OTHER CONTRACEPTIVE MANAGEMENT: Status: ACTIVE | Noted: 2018-02-23

## 2018-06-26 NOTE — PROGRESS NOTES
Thank you very much for your hard work, everyone, following up on and documenting attempts to coordinate Norman Watts's access to mental health services.     I have updated the Specialty Comments in the SnapShot regarding goals for the 6/27/18 visit and I have updated the Problem List to reflect attempts to connect Norman Watts with Garcia, Kalin, and [facility] case coordinator. Hopefully this will help keep track of what all we have tried and these comments are visible to other [facility] providers so hopefully we can be most efficient in finding Norman Watts's the most effective interventions.     I look forward to taking over Norman Watts's care as her PCP. Thanks again!     Mallory Lees DO 06/25/18 9:33 PM  Mallory Lees DO    [place] Family Medicine Resident, PGY-1

## 2018-06-27 ENCOUNTER — OFFICE VISIT (OUTPATIENT)
Dept: FAMILY MEDICINE | Facility: CLINIC | Age: 28
End: 2018-06-27
Payer: COMMERCIAL

## 2018-06-27 VITALS
TEMPERATURE: 97.5 F | SYSTOLIC BLOOD PRESSURE: 100 MMHG | DIASTOLIC BLOOD PRESSURE: 62 MMHG | BODY MASS INDEX: 21.64 KG/M2 | WEIGHT: 113.6 LBS | HEART RATE: 83 BPM

## 2018-06-27 DIAGNOSIS — R56.9 SEIZURES (H): ICD-10-CM

## 2018-06-27 DIAGNOSIS — F51.05 INSOMNIA DUE TO OTHER MENTAL DISORDER: ICD-10-CM

## 2018-06-27 DIAGNOSIS — F99 INSOMNIA DUE TO OTHER MENTAL DISORDER: ICD-10-CM

## 2018-06-27 DIAGNOSIS — F43.10 PTSD (POST-TRAUMATIC STRESS DISORDER): ICD-10-CM

## 2018-06-27 DIAGNOSIS — F41.9 ANXIETY DISORDER, UNSPECIFIED TYPE: ICD-10-CM

## 2018-06-27 RX ORDER — RISPERIDONE 1 MG/1
1 TABLET ORAL DAILY
Qty: 60 TABLET | Refills: 0 | Status: SHIPPED | OUTPATIENT
Start: 2018-06-27 | End: 2018-09-11

## 2018-06-27 RX ORDER — LAMOTRIGINE 150 MG/1
150 TABLET ORAL DAILY
Qty: 60 TABLET | Refills: 0 | Status: SHIPPED | OUTPATIENT
Start: 2018-06-27 | End: 2018-09-11

## 2018-06-27 RX ORDER — HYDROXYZINE HYDROCHLORIDE 25 MG/1
25-50 TABLET, FILM COATED ORAL
Qty: 60 TABLET | Refills: 1 | Status: SHIPPED | OUTPATIENT
Start: 2018-06-27 | End: 2018-08-01

## 2018-06-27 RX ORDER — SERTRALINE HYDROCHLORIDE 100 MG/1
200 TABLET, FILM COATED ORAL DAILY
Qty: 90 TABLET | Refills: 3 | Status: SHIPPED | OUTPATIENT
Start: 2018-06-27 | End: 2018-08-01

## 2018-06-27 RX ORDER — LANOLIN ALCOHOL/MO/W.PET/CERES
800 CREAM (GRAM) TOPICAL DAILY
Qty: 90 TABLET | Refills: 3 | Status: SHIPPED | OUTPATIENT
Start: 2018-06-27 | End: 2019-01-04

## 2018-06-27 RX ORDER — PRAZOSIN HYDROCHLORIDE 1 MG/1
1 CAPSULE ORAL AT BEDTIME
Qty: 90 CAPSULE | Refills: 1 | Status: SHIPPED | OUTPATIENT
Start: 2018-06-27 | End: 2019-01-04

## 2018-06-27 NOTE — PATIENT INSTRUCTIONS
Dr. Stacie Lees      - Follow up with Dr. Jimenez    PSYCHOLOGY REFERRAL  Referral forwarded to CHALO Kaufman for review

## 2018-06-27 NOTE — MR AVS SNAPSHOT
After Visit Summary   6/27/2018    Norman Watts    MRN: [id number]           Patient Information     Date Of Birth          1990        Visit Information        Provider Department      6/27/2018 1:50 PM Elvin Brizuela DO [facility]        Today's Diagnoses     Seizures (H)        Anxiety disorder, unspecified type        PTSD (post-traumatic stress disorder)        Insomnia due to other mental disorder          Care Instructions    Dr. Stacie Lees      - Follow up with Dr. Jimenez          Follow-ups after your visit        Additional Services     PSYCHOLOGY REFERRAL       Patient to stop at the [employer] Desk    Reason for Referral: Patient missed natalis 2 x and is discharged from their service. [facility] unable to get mental health coordinator through their program. Referral to connect her with Mental Health Case Coordinator. Will Also need formal Psychological referral for this to occur     needed: Yes  Language: Jennifer    May leave message on voicemail: Yes                  Future tests that were ordered for you today     Open Future Orders        Priority Expected Expires Ordered    PSYCHOLOGY REFERRAL Routine  11/27/2018 6/27/2018            Who to contact     Please call your clinic at [phone] to:    Ask questions about your health    Make or cancel appointments    Discuss your medicines    Learn about your test results    Speak to your doctor            Additional Information About Your Visit        [employer] Information     [employer] gives you secure access to your electronic health record. If you see a primary care provider, you can also send messages to your care team and make appointments. If you have questions, please call your primary care clinic.  If you do not have a primary care provider, please call [phone] and they will assist you.      [employer] is an electronic gateway that provides easy, online access to your medical records. With [employer], you can request a  clinic appointment, read your test results, renew a prescription or communicate with your care team.     To access your existing account, please contact your [facility] or call [phone] for assistance.        Care EveryWhere ID     This is your Care EveryWhere ID. This could be used by other organizations to access your [facility] medical records  [id number]        Your Vitals Were     Pulse Temperature Last Period BMI (Body Mass Index)          83 97.5  F (36.4  C) (Oral) 05/25/2018 (Approximate) 21.64 kg/m2         Blood Pressure from Last 3 Encounters:   06/27/18 100/62   05/23/18 94/59   05/10/18 112/72    Weight from Last 3 Encounters:   06/27/18 113 lb 9.6 oz (51.5 kg)   05/23/18 113 lb 6.4 oz (51.4 kg)   05/10/18 115 lb (52.2 kg)                 Where to get your medicines      These medications were sent to [facility] - [place], [place] - [address], [place] [address]     Phone:  [phone]     folic acid 400 MCG tablet    hydrOXYzine 25 MG tablet    lamoTRIgine 150 MG tablet    prazosin 1 MG capsule    risperiDONE 1 MG tablet    sertraline 100 MG tablet          Primary Care Provider Office Phone # Fax #    Elvin BrizuelaDO [phone]       [address] [address]        Equal Access to Services     FRITZ FLORES AH: Hadii frandy walkero Soaram, waaxda luqadaha, qaybta kaalmada jass, summer esparza. So [facility] [phone].    ATENCIÓN: Si habla español, tiene a yarbrough disposición servicios gratuitos de asistencia lingüística. Llame al [phone].    We comply with applicable federal civil rights laws and [place] laws. We do not discriminate on the basis of race, color, national origin, age, disability, sex, sexual orientation, or gender identity.            Thank you!     Thank you for choosing [facility]  for your care. Our goal is always to provide you  with excellent care. Hearing back from our patients is one way we can continue to improve our services. Please take a few minutes to complete the written survey that you may receive in the mail after your visit with us. Thank you!             Your Updated Medication List - Protect others around you: Learn how to safely use, store and throw away your medicines at www.disposemymeds.org.          This list is accurate as of 6/27/18  2:22 PM.  Always use your most recent med list.                   Brand Name Dispense Instructions for use Diagnosis    cholecalciferol 1000 UNIT tablet    vitamin D3    100 tablet    Take 1 tablet (1,000 Units) by mouth daily    Vitamin D deficiency       folic acid 400 MCG tablet    FOLVITE    90 tablet    Take 2 tablets (800 mcg) by mouth daily    Seizures (H)       hydrOXYzine 25 MG tablet    ATARAX    60 tablet    Take 1-2 tablets (25-50 mg) by mouth nightly as needed    Insomnia due to other mental disorder       lamoTRIgine 150 MG tablet    LaMICtal    60 tablet    Take 1 tablet (150 mg) by mouth daily    Seizures (H)       prazosin 1 MG capsule    MINIPRESS    90 capsule    Take 1 capsule (1 mg) by mouth At Bedtime    Anxiety disorder, unspecified type, PTSD (post-traumatic stress disorder)       risperiDONE 1 MG tablet    risperDAL    60 tablet    Take 1 tablet (1 mg) by mouth daily    Anxiety disorder, unspecified type       sertraline 100 MG tablet    ZOLOFT    90 tablet    Take 2 tablets (200 mg) by mouth daily    Anxiety disorder, unspecified type

## 2018-06-27 NOTE — PROGRESS NOTES
Preceptor attestation:  Vital signs reviewed: /62  Pulse 83  Temp 97.5  F (36.4  C) (Oral)  Wt 113 lb 9.6 oz (51.5 kg)  LMP 05/25/2018 (Approximate)  BMI 21.64 kg/m2    Patient seen, evaluated, and discussed with the resident.  I have verified the content of the note, which accurately reflects my assessment of the patient and the plan of care.    Supervising physician: Chiquis Lozada MD  [facility]

## 2018-06-27 NOTE — NURSING NOTE
Due to patient being non-English speaking/uses sign language, an  was used for this visit. Only for face-to-face interpretation by an external agency, date and length of interpretation can be found on the scanned worksheet.     name: Joesph Morales  Agency: [employer]  Language: Jennifer   Telephone number: [phone]  Type of interpretation: Face-to-face, spoken

## 2018-06-27 NOTE — PROGRESS NOTES
Family History   Problem Relation Age of Onset     Diabetes No family hx of      Coronary Artery Disease No family hx of      Other Cancer No family hx of      Cancer No family hx of      HEART DISEASE No family hx of      Social History     Social History     Marital status:      Spouse name: N/A     Number of children: N/A     Years of education: N/A     Social History Main Topics     Smoking status: Never Smoker     Smokeless tobacco: Never Used     Alcohol use No     Drug use: No     Sexual activity: Not on file     Other Topics Concern     Not on file     Social History Narrative       Nursing Notes:   Chante Nobles  6/27/2018  1:56 PM  Signed  Due to patient being non-English speaking/uses sign language, an  was used for this visit. Only for face-to-face interpretation by an external agency, date and length of interpretation can be found on the scanned worksheet.     name: Joesph Morales  Agency: [employer]  Language: Jennifer   Telephone number: [phone]  Type of interpretation: Face-to-face, spoken      Chief Complaint   Patient presents with     Refill Request     talk with Dr. Brizuela about her medications      Blood pressure 100/62, pulse 83, temperature 97.5  F (36.4  C), temperature source Oral, weight 113 lb 9.6 oz (51.5 kg), last menstrual period 05/25/2018, not currently breastfeeding.      S:  Patient is for follow up. She was unable to go to [facility] and [facility] worker Karina is here today to set up what to do next after missing appointment. Patient also has questions if any medications causes shaking of hands. From review of charts, patient already had symptoms of shaking 10/23/18. Patient admits to having anxiety still but improved. Shaking only occurs when cooking. Shaking not described as tics. Patient reports she no longer sees objects like tigers or soldeirs as frequently. Only seen 1x a month.     Patient reports that she she still trying to get pregnant. No  contraception use. Wants to makes sure all the medications she is using isn't preventing her from getting pregnant. LMP 6/25/18. No thoughts of hurting self, SI, or hurting others      ROS:   No fevers, chills, headaches, chest pain, trouble breathing. Reports no shaking hands tdoay but occurs when she is cooking. Not occur at other times.    O:  /62  Pulse 83  Temp 97.5  F (36.4  C) (Oral)  Wt 113 lb 9.6 oz (51.5 kg)  LMP 05/25/2018 (Approximate)  BMI 21.64 kg/m2  General: On Chair, no acute distress  HEENT: No conjunctivitis, EOM grossly intact,   Heart: RRR, no murmurs, rubs, or clicks  Lungs: CTA all fields, no wheeze, no crackles, no respiratory distress  Extremites: No edema, no lesions noted, No shaking of hands  Skin: No lesions, no edema, excorations, or rashes noted      A/P:  Patient was unable to go to [facility] despite repeated encouragement from Karina ([facility]). Will refill medications today. Discussed continuing medications at this time but patient will benefit from further psych evaluation. [facility] was unable to obtan mental health coordinator. Will refer to behavioural health to help get set up for mental health coordinator. Also discussed continuing to take medications as she is improving in symptoms and hallucinations. Her seizures also appear to be improving. The shaking she is experiencing appears to be from anxiety. Does not appear to be tics or akathesia from antipsychotics. But will continue to monitor. Discussed continuing her folic acid as well for prenatel wellbeing  - Behaviour health referral for mental health coordinator  - Continue with medications    1. Seizures (H)  - folic acid (FOLVITE) 400 MCG tablet; Take 2 tablets (800 mcg) by mouth daily  Dispense: 90 tablet; Refill: 3  - lamoTRIgine (LAMICTAL) 150 MG tablet; Take 1 tablet (150 mg) by mouth daily  Dispense: 60 tablet; Refill: 0    2. Anxiety disorder, unspecified type  - prazosin (MINIPRESS) 1 MG capsule; Take 1 capsule  (1 mg) by mouth At Bedtime  Dispense: 90 capsule; Refill: 1  - PSYCHOLOGY REFERRAL; Future  - sertraline (ZOLOFT) 100 MG tablet; Take 2 tablets (200 mg) by mouth daily  Dispense: 90 tablet; Refill: 3  - risperiDONE (RISPERDAL) 1 MG tablet; Take 1 tablet (1 mg) by mouth daily  Dispense: 60 tablet; Refill: 0    3. PTSD (post-traumatic stress disorder)  - prazosin (MINIPRESS) 1 MG capsule; Take 1 capsule (1 mg) by mouth At Bedtime  Dispense: 90 capsule; Refill: 1  - PSYCHOLOGY REFERRAL; Future    4. Insomnia due to other mental disorder  - hydrOXYzine (ATARAX) 25 MG tablet; Take 1-2 tablets (25-50 mg) by mouth nightly as needed  Dispense: 60 tablet; Refill: 1    Elvin Brizuela  Family Medicine Resident PGY3

## 2018-06-28 ASSESSMENT — PATIENT HEALTH QUESTIONNAIRE - PHQ9: SUM OF ALL RESPONSES TO PHQ QUESTIONS 1-9: 1

## 2018-06-29 ENCOUNTER — HOME CARE/HOSPICE - HEALTHEAST (OUTPATIENT)
Dept: HOME HEALTH SERVICES | Facility: HOME HEALTH | Age: 28
End: 2018-06-29

## 2018-06-29 NOTE — PROGRESS NOTES
A new MH referral has been entered, by , on 06/27/18. It appears it is a referral for a mental health coordinator.      Team- please advise. Thank you.      YO Mccullough

## 2018-07-03 ENCOUNTER — HOME CARE/HOSPICE - HEALTHEAST (OUTPATIENT)
Dept: HOME HEALTH SERVICES | Facility: HOME HEALTH | Age: 28
End: 2018-07-03

## 2018-07-03 ENCOUNTER — MEDICAL CORRESPONDENCE (OUTPATIENT)
Dept: HEALTH INFORMATION MANAGEMENT | Facility: CLINIC | Age: 28
End: 2018-07-03

## 2018-07-03 NOTE — PROGRESS NOTES
Review of Dr. Brizuela's order and note indicates that this is a referral for help connecting to a psychologist for assessment and referral to mental health case management.  She already receives therapy and [facility] services via [facility], but they do not have a provider who can make this referral. She had been referred to [facility] for this in the past, but missed two visits and can no longer schedule there.   I think we had also tried to connect her to psychiatry there and I would like to clarify with Dr. Brizuela if he is also wanting psychiatry for her as this is somewhat unclear to me, but given his recent graduation, I don't believe he will be available for this conversation.    Dr. Lombardi has several openings both this week and next.  Gogo - can you please arrange diagnostic visit with Dr. Lombardi so that we can complete assessment in house and refer to mental health case management if she meets criteria for this.  Would be good to get most recent DA and therapy note from [facility] for Dr. Lombardi's review prior to their initial visit if possible.  We can also try to get additional clarity around need for psychiatry at that time and work on plan for that if needed.    It appears that she will be seeing Dr. Lees in the future, but I see no current appts in the schedule for this transition.  Perhaps when we set up visit with Dr. Lombardi we can also arrange visit with Dr. Lees in the next month or so.    Let me know if you have questions or would like additional follow up from me.  Thanks!      Ana Jordan, Ph.D.,     Disclaimer  The above treatment recommendations are based on consultation with the patient's primary care provider and a review of relevant information in EPIC.? I have not personally examined the patient.? All recommendations should be implemented with considerations of the patient's relevant prior history and current clinical status.  Please contact me with any questions about the  care of this patient.

## 2018-07-04 ENCOUNTER — HOME CARE/HOSPICE - HEALTHEAST (OUTPATIENT)
Dept: ADMINISTRATIVE | Facility: OTHER | Age: 28
End: 2018-07-04

## 2018-07-06 ENCOUNTER — TELEPHONE (OUTPATIENT)
Dept: FAMILY MEDICINE | Facility: CLINIC | Age: 28
End: 2018-07-06

## 2018-07-06 NOTE — TELEPHONE ENCOUNTER
[facility] phone call message- general phone call:    Reason for call: Pt is confused if she is on any type of birth control.  She thinks she not using anything but the nurse told her she is.  She said it was some kind of implant.  Please check and call pt back with an .    Return call needed: Yes    OK to leave a message on voice mail? Yes    Primary language: Jennifer      needed? Yes    Call taken on July 6, 2018 at 12:43 PM by Peyton Pedraza

## 2018-07-06 NOTE — TELEPHONE ENCOUNTER
Routed to Dr. Lees and Antonette-care coordinator. I'm not sure what is going on with the BC status this pt-see note note on problem list. /TINA Mon

## 2018-07-09 NOTE — TELEPHONE ENCOUNTER
Patient was called and notified.  She is scheduled to see Dr. Lees on 08/10/2018 at 4:30pm.    Thank you, KEYONA Brooks

## 2018-07-11 NOTE — PROGRESS NOTES
SHARI consulted with Dr.Lombardi on his availability.     SHARI called Kalin and requested current DA and recent therapy notes to be sent over. This will work on this.     Reached out to patient. She states she does not want to schedule another MH appointment as she has a therapist and [facility] already. This SW talked through the purpose of the visit and explained it would be 1x. Norman states she would schedule if it's only going to be a 1x visit. She states she does not have transportation and asked this SW to discuss the appointment date/time for transportation purposes with her [facility] worker, Carrie.    SHARI spoke with Carrie. She is open to assisting with the transportation. She inquires on why her current therapist cannot complete the referral for  . This SW indicated they were not clear on why it's not being tasked to the therapist, Vanessa Milian. Carrie states that if Vanessa cannot signoff on the referral, that her supervisor, Dr.Paul Lewis should be able to assist. SHARI and [facility] worker in agreement that postponing the [facility] appointment is best until it is clarified why this cannot be done with her existing therapist. This was agreed as to reduce the # of visits and barriers for the patient. Carrie is agreeing to helping with transportation to the clinic if it's necessary. Her availability is best on Fridays from 11am-12pm, that's her regular visits with Norman. However, she could be available on a different day if given enough notice.     SHARI called Vanessa in attempts to clarify the above, if she is able to complete the   referral. Per [facility] referral form, therapist needs to be a mental health practitioner that is of any of the following licensure's/credentials:  Advanced NP  LICSW  Psychiatrist  LMFT  [facility]    Left a message for a return call from Vanessa.     Routing message to  and Dr.Lombardi for clarification on their knowledge of the situation.      YO Mccullough

## 2018-07-11 NOTE — PROGRESS NOTES
Recieved a call back from Vanessa, therapist at [facility]. She states that she can assist with completing the referral for a MH . She has not yet discussed this with the patient. She is in the process of becoming licensed but states that her supervisor signs off on her DA's so they can also assist with sign off on forms that require licensed providers to complete.     Vanessa expresses concerns with Norman's changes in behaviors in the past 2 weeks. She states that since Norman was told she had a contraception implant, she has been more bizarre with her behavior. Vanessa encouraged her to call [facility] to discuss this. It was found that Norman is not taking contraceptions, per [facility] records. Norman appeared to understand that information as it was relayed to her. Vanessa states that she ruminates on having a child and wanting to becoming pregnant. They sometimes spend entire sessions discussing this. Vanessa also states that Norman was scheduled to establish with psychiatry, however, she cancelled both visits. She does not want to see anymore doctors at this time.     SHARI informed Vanessa of the 08/10/18 appointment with . Vanessa states she will complete the MH referral at the next visit. She will inform this SW if anything further is needed from [facility].     Gogo Hernandez, SHEYLASW

## 2018-07-13 NOTE — TELEPHONE ENCOUNTER
I was asked to review medications for potential adverse outcomes should this patient get pregnant.    Lamotrigine - category C. Adverse events have been reported in animals. No increased risk for malformations noted in humans but also states that cleft palate or lip cannot be ruled out.  Recommends lowest effective dose and monotherapy for those that are desiring pregnancy (she is on monotherapy and a relatively low dose).    Hydroxyzine is contraindicated d/t potential for addiction/withdrawal in the .    Prazosin has very little information.  It states that there have been adverse effects in animal models, but use in pregnancy is limited. It does say that other therapies are preferred for hypertension in pregnancy, however, I imagine this patient is using it for PTSD.    Risperidone is a category C.  No malformations have been noted (but there is limited data), however there is a risk of withdrawal symptoms in baby after birth.  ACOG recommends to weigh the benefits with the risks and ensure patient has access to a mental health provider during use.  There is a pregnancy registry for this medication (where you would report use so that adverse outcomes could be followed).    Sertraline crosses the placenta.  The biggest risk with sertraline is  abstinence syndrome (ESTEPHANIA) upon childbirth. ACOG does recommend SSRIs in moderate to severely depressed women.    D3 and folic acid are fine.    Note routed to Antonette Carmona and Dr Lees.    Marium Villafuerte, Pharm.D.

## 2018-07-27 ENCOUNTER — TELEPHONE (OUTPATIENT)
Dept: FAMILY MEDICINE | Facility: CLINIC | Age: 28
End: 2018-07-27

## 2018-07-27 NOTE — TELEPHONE ENCOUNTER
[facility] phone call message- general phone call:    Reason for call: She would like a referral for a home care nurse to come out for med management.    Return call needed: Yes    OK to leave a message on voice mail? Yes    Primary language: Jennifer      needed? Yes    Call taken on July 27, 2018 at 12:35 PM by Richard Oro

## 2018-08-01 DIAGNOSIS — F99 INSOMNIA DUE TO OTHER MENTAL DISORDER: ICD-10-CM

## 2018-08-01 DIAGNOSIS — F51.05 INSOMNIA DUE TO OTHER MENTAL DISORDER: ICD-10-CM

## 2018-08-01 DIAGNOSIS — F41.9 ANXIETY DISORDER, UNSPECIFIED TYPE: ICD-10-CM

## 2018-08-05 RX ORDER — SERTRALINE HYDROCHLORIDE 100 MG/1
200 TABLET, FILM COATED ORAL DAILY
Qty: 90 TABLET | Refills: 3 | Status: SHIPPED | OUTPATIENT
Start: 2018-08-05 | End: 2018-08-10

## 2018-08-05 RX ORDER — HYDROXYZINE HYDROCHLORIDE 25 MG/1
25-50 TABLET, FILM COATED ORAL
Qty: 60 TABLET | Refills: 1 | Status: SHIPPED | OUTPATIENT
Start: 2018-08-05 | End: 2018-08-10

## 2018-08-05 NOTE — TELEPHONE ENCOUNTER
I think I need a face-to-face assessment to prescribe a home care nurse.   I see Norman Watts on the 10th. I am CC'ing my advisor on this to double-check that I need a face-to-face for this order.     Thank you very much!

## 2018-08-10 ENCOUNTER — OFFICE VISIT (OUTPATIENT)
Dept: FAMILY MEDICINE | Facility: CLINIC | Age: 28
End: 2018-08-10
Payer: COMMERCIAL

## 2018-08-10 VITALS
HEART RATE: 92 BPM | TEMPERATURE: 98 F | WEIGHT: 114 LBS | DIASTOLIC BLOOD PRESSURE: 64 MMHG | OXYGEN SATURATION: 99 % | BODY MASS INDEX: 21.72 KG/M2 | SYSTOLIC BLOOD PRESSURE: 99 MMHG | RESPIRATION RATE: 16 BRPM

## 2018-08-10 DIAGNOSIS — F41.9 ANXIETY DISORDER, UNSPECIFIED TYPE: ICD-10-CM

## 2018-08-10 RX ORDER — SERTRALINE HYDROCHLORIDE 100 MG/1
150 TABLET, FILM COATED ORAL DAILY
Qty: 90 TABLET | Refills: 3 | Status: SHIPPED | OUTPATIENT
Start: 2018-08-10 | End: 2019-01-04

## 2018-08-10 NOTE — MR AVS SNAPSHOT
After Visit Summary   8/10/2018    Norman Watts    MRN: [id number]           Patient Information     Date Of Birth          1990        Visit Information        Provider Department      8/10/2018 4:30 PM Mallory Lees MD [facility]        Today's Diagnoses     Anxiety disorder, unspecified type           Follow-ups after your visit        Follow-up notes from your care team     Return in about 1 week (around 8/17/2018).      Who to contact     Please call your clinic at [phone] to:    Ask questions about your health    Make or cancel appointments    Discuss your medicines    Learn about your test results    Speak to your doctor            Additional Information About Your Visit        [employer] [facility] Information     [employer] gives you secure access to your electronic health record. If you see a primary care provider, you can also send messages to your care team and make appointments. If you have questions, please call your primary care clinic.  If you do not have a primary care provider, please call [phone] and they will assist you.      [employer] is an electronic gateway that provides easy, online access to your medical records. With [employer], you can request a clinic appointment, read your test results, renew a prescription or communicate with your care team.     To access your existing account, please contact your [facility] or call [phone] for assistance.        Care EveryWhere ID     This is your Care EveryWhere ID. This could be used by other organizations to access your [facility] medical records  [id number]        Your Vitals Were     Pulse Temperature Respirations Last Period Pulse Oximetry BMI (Body Mass Index)    92 98  F (36.7  C) (Oral) 16 08/01/2018 99% 21.72 kg/m2       Blood Pressure from Last 3 Encounters:   08/10/18 99/64   06/27/18 100/62   05/23/18 94/59    Weight from Last 3 Encounters:   08/10/18 114 lb (51.7 kg)   06/27/18 113 lb  9.6 oz (51.5 kg)   05/23/18 113 lb 6.4 oz (51.4 kg)              Today, you had the following     No orders found for display         Today's Medication Changes          These changes are accurate as of 8/10/18  5:40 PM.  If you have any questions, ask your nurse or doctor.               These medicines have changed or have updated prescriptions.        Dose/Directions    sertraline 100 MG tablet   Commonly known as:  ZOLOFT   This may have changed:  how much to take   Used for:  Anxiety disorder, unspecified type   Changed by:  Mallory Lees MD        Dose:  150 mg   Take 1.5 tablets (150 mg) by mouth daily   Quantity:  90 tablet   Refills:  3            Where to get your medicines      These medications were sent to [facility] - [place], [place] - [address], [place] [address]     Phone:  [phone]     sertraline 100 MG tablet                Primary Care Provider Office Phone #    Mallory Lees [phone]       [facility] [address] [place] [address]        Equal Access to Services     FRITZ FLORES : Hadii frandy nieves hadasho Soomaali, waaxda luqadaha, qaybta kaalmada adeegyada, summer esparza. So [facility] [phone].    ATENCIÓN: Si habla español, tiene a yarbrough disposición servicios gratuitos de asistencia lingüística. Llame al [phone].    We comply with applicable federal civil rights laws and [place] laws. We do not discriminate on the basis of race, color, national origin, age, disability, sex, sexual orientation, or gender identity.            Thank you!     Thank you for choosing [facility]  for your care. Our goal is always to provide you with excellent care. Hearing back from our patients is one way we can continue to improve our services. Please take a few minutes to complete the written survey that you may receive in the mail after your visit with us. Thank you!             Your Updated Medication  List - Protect others around you: Learn how to safely use, store and throw away your medicines at www.disposemymeds.org.          This list is accurate as of 8/10/18  5:40 PM.  Always use your most recent med list.                   Brand Name Dispense Instructions for use Diagnosis    cholecalciferol 1000 UNIT tablet    vitamin D3    100 tablet    Take 1 tablet (1,000 Units) by mouth daily    Vitamin D deficiency       folic acid 400 MCG tablet    FOLVITE    90 tablet    Take 2 tablets (800 mcg) by mouth daily    Seizures (H)       lamoTRIgine 150 MG tablet    LaMICtal    60 tablet    Take 1 tablet (150 mg) by mouth daily    Seizures (H)       prazosin 1 MG capsule    MINIPRESS    90 capsule    Take 1 capsule (1 mg) by mouth At Bedtime    Anxiety disorder, unspecified type, PTSD (post-traumatic stress disorder)       risperiDONE 1 MG tablet    risperDAL    60 tablet    Take 1 tablet (1 mg) by mouth daily    Anxiety disorder, unspecified type       sertraline 100 MG tablet    ZOLOFT    90 tablet    Take 1.5 tablets (150 mg) by mouth daily    Anxiety disorder, unspecified type

## 2018-08-10 NOTE — PROGRESS NOTES
HPI       Norman Watts is a 28 year old female with a significant past medical history of seizures, chronic hepatitis B, anxiety, depression with psychotic features who presents for follow up of concern(s) listed below:    Norman Watts wants to stop a medicine. Medicines are making her tired.     She can do house work. She is sleeping 6-7 hours at night. No naps.    She believes the folate, vitamin D, risperidone, and sertraline are making her too tired.     She reports being on the medications about 1 year. Counseling nurse comes by 1-2 times a week and asks questions about her feelings and health. She might have an in-home therapist. She feels much better mentally and emotionally.     A [employer]  was used for this visit.      Patient Active Problem List   Diagnosis     Seizures (H)     Chronic viral hepatitis B without delta agent and without coma (H)     Anxiety disorder, unspecified type     Depression     Unspecified Anxiety Disorder     Severe single current episode of major depressive disorder, with psychotic features (H)     Encounter for other contraceptive management     Current Outpatient Prescriptions   Medication Sig Dispense Refill     cholecalciferol (VITAMIN D3) 1000 UNIT tablet Take 1 tablet (1,000 Units) by mouth daily 100 tablet 3     folic acid (FOLVITE) 400 MCG tablet Take 2 tablets (800 mcg) by mouth daily 90 tablet 3     lamoTRIgine (LAMICTAL) 150 MG tablet Take 1 tablet (150 mg) by mouth daily 60 tablet 0     prazosin (MINIPRESS) 1 MG capsule Take 1 capsule (1 mg) by mouth At Bedtime 90 capsule 1     risperiDONE (RISPERDAL) 1 MG tablet Take 1 tablet (1 mg) by mouth daily 60 tablet 0     sertraline (ZOLOFT) 100 MG tablet Take 1.5 tablets (150 mg) by mouth daily 90 tablet 3        Allergies   Allergen Reactions     Keppra [Levetiracetam] Other (See Comments)     Suicidal ideation       No results found for this or any previous visit (from the past 24 hour(s)).       Review of Systems:    CONSTITUTIONAL: fatigue, no unexpected change in weight  SKIN: no worrisome rashes  EYES: no acute vision problems or changes  ENT: no ear problems, no mouth problems, no throat problems  RESP: no significant cough, no shortness of breath  CV: no chest pain, no palpitations, no new or worsening peripheral edema  GI: no nausea, no vomiting, no constipation, no diarrhea  : no frequency, no dysuria, no hematuria  NEURO: no weakness, no dizziness, no syncope, no headaches  ENDOCRINE: no temperature intolerance, no skin/hair changes            Physical Exam:     Vitals:    08/10/18 1633   BP: 99/64   BP Location: Left arm   Patient Position: Sitting   Cuff Size: Adult Regular   Pulse: 92   Resp: 16   Temp: 98  F (36.7  C)   TempSrc: Oral   SpO2: 99%   Weight: 114 lb (51.7 kg)     Body mass index is 21.72 kg/(m^2).    GENERAL: healthy, alert and no distress  EYES: Eyes grossly normal to inspection  RESP: lungs clear to auscultation - no rales, no rhonchi, no wheezes  CV: regular rates and rhythm, normal S1 S2, no S3 or S4 and no murmur, no click or rub  PSYCH: Alert and oriented times 3; speech- coherent , normal rate and volume; able to articulate logical thoughts, able to abstract reason, no tangential thoughts, no hallucinations or delusions, affect- normal  LYMPHATICS: ant. cervical- normal, post. cervical- normal    Assessment and Plan      Norman was seen today for follow up for and medication problem.    Diagnoses and all orders for this visit:    Anxiety disorder, unspecified type  -     sertraline (ZOLOFT) 100 MG tablet; Take 1.5 tablets (150 mg) by mouth daily      Discussed Norman Watts that her symptoms were very severe and if she stops the medication they could return. She stated understanding and wanted to proceed with stopping her medications.     I counseled Norman Watts that stopping the medicines abruptly will make her sick. I offered that we decrease the sertraline and check up in a week. If she is  tolerating the decrease, we can decrease again. She agreed to this plan.     I presented her a write-in calendar that she can rate her tiredness on a scale of 0 to 10. She is to rate her fatigue everyday and bring this to the next visit.     Options for treatment and follow-up care were reviewed with the patient and/or guardian. Norman MAGDALENO Modoyle and/or guardian engaged in the decision making process and verbalized understanding of the options discussed and agreed with the final plan.    Mallory Lees, DO

## 2018-08-10 NOTE — NURSING NOTE
Due to patient being non-English speaking/uses sign language, an  was used for this visit. Only for face-to-face interpretation by an external agency, date and length of interpretation can be found on the scanned worksheet.     name: Jeff Pratt  Agency: [employer]  Language: Jennifer   Telephone number: [phone]  Type of interpretation: Face-to-face, spoken

## 2018-08-16 ENCOUNTER — DOCUMENTATION ONLY (OUTPATIENT)
Dept: FAMILY MEDICINE | Facility: CLINIC | Age: 28
End: 2018-08-16

## 2018-08-16 NOTE — PROGRESS NOTES
Interprofessional Team Consultation Note   Requesting Provider: Dr. Lees    Consultants:  Behavioral Health: Dr. Lombardi  Care Coordination: Gogo Dumont  PharmD: Reena Evans (Pharmacy Student)  Family Medicine Physicians: Dr. Lees, Dr. Lozada    IDENTIFYING DATA/REASON FOR REFERRAL:  Norman Watts is 28 year old female who is cared for by Dr. Lees. Dr. Lees is requesting consultation related to continued management of patient with complex medical and mental health concerns. Relevant clinical information obtained from requesting PCP, interprofessional team members noted above and review of the medical record.     Patient Active Problem List   Diagnosis     Seizures (H)     Chronic viral hepatitis B without delta agent and without coma (H)     Anxiety disorder, unspecified type     Depression     Unspecified Anxiety Disorder     Severe single current episode of major depressive disorder, with psychotic features (H)     Encounter for other contraceptive management     Current Outpatient Prescriptions   Medication     cholecalciferol (VITAMIN D3) 1000 UNIT tablet     folic acid (FOLVITE) 400 MCG tablet     lamoTRIgine (LAMICTAL) 150 MG tablet     prazosin (MINIPRESS) 1 MG capsule     risperiDONE (RISPERDAL) 1 MG tablet     sertraline (ZOLOFT) 100 MG tablet     No current facility-administered medications for this visit.      Topics Discussed:    Patient discussed during previous interdisciplinary team consultation (See documentation from 6/12/2018) concerning transition planning from patient's graduating PCP (Dr. Brizuela) to new provider (Dr. Lees). Patient recently established care with Dr. Lees, and met for initial appointment on 8/10/2018. Discussion during current interdisciplinary team consultation focused primarily on management of patient's complex mental health concerns.    Patient previously connected with mental health services through [employer],  including in-home psychotherapy and [facility], and during initial appointment with Dr. Lees indicated she continues to be established with these services. Discussed possibility of getting patient connected with mental health case management services for additional support during previous interdisciplinary team consultation, and per recent communication with patient's in-home therapist (Vanessa) plan had been for in-home therapist to assess patient's interest/receptivity to getting established with mental health case management services and to initiate referral process pending patient interest. Unclear if this referral has been discussed/initiated.     Discussed previous efforts to connect patient with psychiatric services within the community for ongoing psychiatric medication management, which were unsuccessful. Patient previously reported being unreceptive to getting connected with these services due to not wanting to establish care with an additional provider. Discussed potential benefit of revisiting this moving forward.     Recommendations/Action Items:  1. YO Mccullough to reach out to [facility] to clarify current care team (Confirm continued engagement with [facility] worker and in-home therapist and identify additional potential members of care team), inquire about status of referral for mental health case management services, and request recent records.  2. Recommend conversation about psychiatric services be revisited moving forward. YO Mccullough to discuss this with care team at [facility] in order to coordinate care around this and to clarify if this has been discussed further with patient.   3. Recommend close follow-up with Dr. Lees. YO Mccullough to reach out to patient to facilitate scheduling of appointment.       Nathaniel Lombardi, PhD,   Behavioral Health Fellow      Disclaimer  The above treatment recommendations are based on consultation  with the patient's primary care provider and a review of relevant information in EPIC. I have not personally examined the patient. All recommendations should be implemented with considerations of the patient's relevant prior history and current clinical status. Please contact me with any questions about the care of this patient.

## 2018-08-27 NOTE — PROGRESS NOTES
I have reviewed and agree with the behavioral health fellow's summary and recommendations.  Ana Jordan, PhD., LP

## 2018-09-11 DIAGNOSIS — F41.9 ANXIETY DISORDER, UNSPECIFIED TYPE: ICD-10-CM

## 2018-09-11 DIAGNOSIS — R56.9 SEIZURES (H): ICD-10-CM

## 2018-09-12 RX ORDER — RISPERIDONE 1 MG/1
1 TABLET ORAL DAILY
Qty: 60 TABLET | Refills: 0 | Status: SHIPPED | OUTPATIENT
Start: 2018-09-12 | End: 2019-01-04

## 2018-09-12 RX ORDER — LAMOTRIGINE 150 MG/1
150 TABLET ORAL DAILY
Qty: 60 TABLET | Refills: 0 | Status: SHIPPED | OUTPATIENT
Start: 2018-09-12 | End: 2019-01-04

## 2018-10-18 RX ORDER — HYDROXYZINE HYDROCHLORIDE 25 MG/1
25 TABLET, FILM COATED ORAL
COMMUNITY
End: 2019-01-04

## 2018-10-18 NOTE — TELEPHONE ENCOUNTER
[facility] pharmacy is requesting a refill of Famotidine 40mg, however it is no longer on the pt's med list.  Please advise.  Amie Breaux, CMA

## 2018-10-19 RX ORDER — HYDROXYZINE HYDROCHLORIDE 25 MG/1
25 TABLET, FILM COATED ORAL
Qty: 120 TABLET | OUTPATIENT
Start: 2018-10-19

## 2018-10-19 NOTE — TELEPHONE ENCOUNTER
"Norman Watts was last seen in the office in August 2018. At that time she was complaining of \"tiredness\" and wanting to stop all her medications. She was supposed to start a taper and follow up but was lost to follow up. The requested medication has the likely side effect of sedation. Her symptoms of \"tiredness\" should be reassessed before refilling this medication.   "

## 2018-10-22 NOTE — TELEPHONE ENCOUNTER
Called patient with  (Talya). Patient did not  and unable to leave message. Will try again later.     Gayatri Cullen, CMA

## 2019-01-04 ENCOUNTER — OFFICE VISIT (OUTPATIENT)
Dept: FAMILY MEDICINE | Facility: CLINIC | Age: 29
End: 2019-01-04
Payer: COMMERCIAL

## 2019-01-04 VITALS
SYSTOLIC BLOOD PRESSURE: 91 MMHG | BODY MASS INDEX: 20.38 KG/M2 | OXYGEN SATURATION: 94 % | WEIGHT: 107 LBS | TEMPERATURE: 98.1 F | RESPIRATION RATE: 20 BRPM | DIASTOLIC BLOOD PRESSURE: 65 MMHG | HEART RATE: 119 BPM

## 2019-01-04 DIAGNOSIS — R56.9 SEIZURES (H): Primary | ICD-10-CM

## 2019-01-04 DIAGNOSIS — Z32.01 PREGNANCY TEST POSITIVE: ICD-10-CM

## 2019-01-04 DIAGNOSIS — F41.9 ANXIETY DISORDER, UNSPECIFIED TYPE: ICD-10-CM

## 2019-01-04 RX ORDER — PRENATAL VIT/IRON FUM/FOLIC AC 27MG-0.8MG
1 TABLET ORAL DAILY
Qty: 90 TABLET | Refills: 3 | Status: SHIPPED | OUTPATIENT
Start: 2019-01-04 | End: 2020-01-04

## 2019-01-04 RX ORDER — LANOLIN ALCOHOL/MO/W.PET/CERES
800 CREAM (GRAM) TOPICAL DAILY
Qty: 180 TABLET | Refills: 3 | Status: SHIPPED | OUTPATIENT
Start: 2019-01-04 | End: 2020-06-26

## 2019-01-04 RX ORDER — LAMOTRIGINE 25 MG/1
25 TABLET ORAL DAILY
Qty: 30 TABLET | Refills: 1 | Status: SHIPPED | OUTPATIENT
Start: 2019-01-04 | End: 2019-01-14

## 2019-01-04 RX ORDER — LEVETIRACETAM 1000 MG/1
1000 TABLET ORAL DAILY
Qty: 30 TABLET | Refills: 1 | Status: SHIPPED | OUTPATIENT
Start: 2019-01-04 | End: 2019-02-05

## 2019-01-04 NOTE — NURSING NOTE
Due to patient being non-English speaking/uses sign language, an  was used for this visit. Only for face-to-face interpretation by an external agency, date and length of interpretation can be found on the scanned worksheet.     name: Anastacio Hemphill (Htoo)  Agency: [employer]  Language: Jennifer   Telephone number: [phone]  Type of interpretation: Face-to-face, spoken

## 2019-01-04 NOTE — PROGRESS NOTES
SUBJECTIVE       Norman Watts is a 29 year old  female with a PMH significant for:     Patient Active Problem List   Diagnosis     Seizures (H)     Chronic viral hepatitis B without delta agent and without coma (H)     Anxiety disorder, unspecified type     Depression     Unspecified Anxiety Disorder     Severe single current episode of major depressive disorder, with psychotic features (H)     Encounter for other contraceptive management     She presents with significant other after hospitalization for seizure.    Patient was diagnosed with epilepsy 5-6 years ago.  She has been taking medication for the last 3-1/2 years.  Patient states she still has seizures occasionally with medication.  Patient's pharmacy recently changed and she was unable to  her seizure medications.  Patient seizures increased to 6-7 times daily.  She was brought to the ED and admitted at [facility] on 12/31/2018.  Hospitalization records reviewed.  Medications updated.  Patient has remained seizure-free since hospitalization.  Patient has continued to take her medications as prescribed.  She is not currently taking any prenatal vitamin due to not having any.  Patient notes her lip remains sore from biting it during 1 of the seizures but denies any other injuries or symptoms.  Patient is scheduled for follow-up wit neurology on 1/10/19.    Patient had positive urine pregnancy test in November.  Her LMP is unknown but was sometime in October. She does have regular periods.  Patient has not had any bleeding since her last menstrual period.  Patient has not sought care yet.  Patient is scheduled for follow-up with Edie on 1/14/2019.    PMH, Medications and Allergies were reviewed and updated as needed.        REVIEW OF SYSTEMS     INTEGUMENTARY/SKIN: NEGATIVE for worrisome rashes, moles or lesions  EYES: NEGATIVE for vision changes or irritation  ENT/MOUTH: NEGATIVE for ear, mouth and throat problems  RESP: NEGATIVE for significant  cough or SOB  CV: NEGATIVE for chest pain, palpitations or peripheral edema  GI: NEGATIVE for nausea, abdominal pain, heartburn, or change in bowel habits  : NEGATIVE for frequency, dysuria, or hematuria  MUSCULOSKELETAL: NEGATIVE for significant arthralgias or myalgia  NEURO: NEGATIVE for weakness, dizziness or paresthesias  PSYCHIATRIC: NEGATIVE for changes in mood or affect        OBJECTIVE     Vitals:    01/04/19 1436   BP: 91/65   Pulse: 119   Resp: 20   Temp: 98.1  F (36.7  C)   TempSrc: Oral   SpO2: 94%   Weight: 48.5 kg (107 lb)     Body mass index is 20.38 kg/m .    Constitutional: Awake, alert, cooperative, no apparent distress, and appears stated age.  Eyes: Lids and lashes normal, sclera clear, conjunctiva normal.  ENT: Normocephalic, without obvious abnormality, atramatic,   Lungs: No increased work of breathing, good air exchange, clear to auscultation bilaterally, no crackles or wheezing.  Cardiovascular: Regular rate and rhythm, normal S1 and S2, no S3 or S4, and no murmur noted.  Abdomen: No scars, normal bowel sounds, soft, non-distended, non-tender, no masses palpated, no hepatosplenomegally.  Musculoskeletal: No redness, warmth, or swelling of the joints.  Full range of motion noted.   Neurologic: Awake, alert, oriented to name, place and time.  Cranial nerves II-XII are grossly intact and gait is normal.    No results found for this or any previous visit (from the past 24 hour(s)).        ASSESSMENT AND PLAN     Paw was seen today for forms.    Diagnoses and all orders for this visit:    Seizures (H): Provided refills of medications and encouraged patient to follow-up with neurology.  Seizure medications are appropriate in pregnancy.  Encouraged high-dose folate regularly.  -     lamoTRIgine (LAMICTAL) 25 MG tablet; Take 1 tablet (25 mg) by mouth daily  -     folic acid (FOLVITE) 400 MCG tablet; Take 2 tablets (800 mcg) by mouth daily  -     levETIRAcetam (KEPPRA) 1000 MG tablet; Take 1 tablet  (1,000 mg) by mouth daily  -      : Sign Language or Oral - 38-52 minutes    Pregnancy test positive: Provided prescription for prenatal vitamin.  Advised to schedule dating ultrasound.  -     Prenatal Vit-Fe Fumarate-FA (PRENATAL MULTIVITAMIN W/IRON) 27-0.8 MG tablet; Take 1 tablet by mouth daily  -     US OB <14 Weeks w Transvaginal Single; Future    Anxiety disorder, unspecified type: Started/continued on Zoloft by psychiatry inpatient.  Provided refills.  -     sertraline (ZOLOFT) 50 MG tablet; Take 1 tablet (50 mg) by mouth daily    RTC on 1/14/19 for first prenatal or sooner if develops new or worsening symptoms.  I discussed the patient with  who is in agreement with the assessment and plan.   Amie Fletcher

## 2019-01-14 ENCOUNTER — OFFICE VISIT (OUTPATIENT)
Dept: FAMILY MEDICINE | Facility: CLINIC | Age: 29
End: 2019-01-14
Payer: COMMERCIAL

## 2019-01-14 VITALS
BODY MASS INDEX: 19.86 KG/M2 | HEART RATE: 99 BPM | HEIGHT: 61 IN | SYSTOLIC BLOOD PRESSURE: 99 MMHG | RESPIRATION RATE: 20 BRPM | OXYGEN SATURATION: 100 % | TEMPERATURE: 98.3 F | DIASTOLIC BLOOD PRESSURE: 64 MMHG | WEIGHT: 105.2 LBS

## 2019-01-14 DIAGNOSIS — Z32.01 PREGNANCY EXAMINATION OR TEST, POSITIVE RESULT: Primary | ICD-10-CM

## 2019-01-14 DIAGNOSIS — N92.6 MISSED PERIODS: Primary | ICD-10-CM

## 2019-01-14 DIAGNOSIS — R56.9 SEIZURES (H): ICD-10-CM

## 2019-01-14 DIAGNOSIS — Z32.01 PREGNANCY EXAMINATION OR TEST, POSITIVE RESULT: ICD-10-CM

## 2019-01-14 LAB — HCG UR QL: POSITIVE

## 2019-01-14 RX ORDER — LAMOTRIGINE 25 MG/1
50 TABLET ORAL DAILY
Qty: 60 TABLET | Refills: 1 | Status: SHIPPED | OUTPATIENT
Start: 2019-01-14 | End: 2019-02-05

## 2019-01-14 ASSESSMENT — MIFFLIN-ST. JEOR: SCORE: 1141.81

## 2019-01-14 NOTE — PROGRESS NOTES
SUBJECTIVE       Norman Watts is a 29 year old female with a PMH significant for   Patient Active Problem List   Diagnosis     Seizures (H)     Chronic viral hepatitis B without delta agent and without coma (H)     Anxiety disorder, unspecified type     Depression     Unspecified Anxiety Disorder     Severe single current episode of major depressive disorder, with psychotic features (H)     Encounter for other contraceptive management    who presents for evaluation of follow up after a recent hospitalization for seizure.    F/u Seizure  Patient coming in for follow-up after recent hospitalization for seizure, it was thought to be secondary to medication noncompliance.  Patient has long-standing history of tonic-clonic seizures in the past and was supposed to be on Keppra as well as Lamictal.  Patient reports the day that she had been taking her medications appropriately.  She is currently 9 weeks pregnant but otherwise has not had any other medical changes.  Patient was found to have 2 episodes of generalized tonic-clonic seizures and so was evaluated at [facility], she was found to have a third seizure while in the ED.  Patient was also noted to have hallucinations after her seizures which was thought to be related but also has history of depression and was also concerning for depression with psychotic features.  Since that time, patient has not had any more generalized tonic-clonic seizures.  Denies any headache vision changes numbness or weakness anywhere.  Patient was started on sertraline 50 mg daily for suspected anxiety as well as depression with possible hallucinations.  She denies any side effects relating to this.    Pregnancy  Patient was found to be pregnant a few weeks ago, currently thought to be 9 weeks gestation.  Denies any nausea, vomiting associated with this.  Has not had any vaginal bleeding since her last period.  Patient has been taking her prenatal vitamins as well as folate. She is  "yet to have her first OB visit, has not yet gotten her ultrasound. Patient speaks Jennifer, so an  was used.    Medications and problem list have been reviewed and updated.         REVIEW OF SYSTEMS     General: No fevers, chills  Head: No headache, dizziness  Neck: No swallowing problems   Resp: No cough. No congestion, coryza  GI: No constipation, diarrhea, no nausea or vomiting  Skin: No rash        OBJECTIVE     Vitals:    01/14/19 0911   BP: 99/64   Pulse: 99   Resp: 20   Temp: 98.3  F (36.8  C)   TempSrc: Oral   SpO2: 100%   Weight: 47.7 kg (105 lb 3.2 oz)   Height: 1.545 m (5' 0.83\")     Body mass index is 19.99 kg/m .  Gen:  In NAD, good color, appears well hydrated  HEENT: No Scleral icterus or conjunctival injection  Neck: supple without lymphadenopathy  CV:  RRR  - no murmurs, age appropriate rate  Pulm:  CTAB, no wheezes/rales/rhonchi, good air entry   Abdomen: Did not assess.   Skin: No rashes or lesions noted.  Neuro: No focal neuro deficits noted.     Results for orders placed or performed in visit on 01/14/19 (from the past 24 hour(s))   HCG Qualitative Urine (UPT)  ([facility])   Result Value Ref Range    HCG Qual Urine POSITIVE Negative     Current Outpatient Medications   Medication     folic acid (FOLVITE) 400 MCG tablet     lamoTRIgine (LAMICTAL) 25 MG tablet     levETIRAcetam (KEPPRA) 1000 MG tablet     Prenatal Vit-Fe Fumarate-FA (PRENATAL MULTIVITAMIN W/IRON) 27-0.8 MG tablet     sertraline (ZOLOFT) 50 MG tablet     No current facility-administered medications for this visit.      ASSESSMENT AND PLAN     Paw was seen today for recheck.    Diagnoses and all orders for this visit:    Pregnancy  Patient came in today for evaluation of pregnancy, was noted to have a positive hCG at prior visit.  This was supposed to be scheduled as a first OB visit however patient was unaware.  She is yet to have ultrasounds dating done.  She is agreeable to get this started.  Also will be coming in for first " OB visit.  - Plan for dating ultrasound  - Schedule first OB visit  - Continue taking prenatals.     Generalized tonic-clonic seizures  Patient with history of generalized tonic-clonic seizures, recently had some on 1/4/19 for which she needed hospitalization.  Was started on Lamictal as well as Keppra, has not had breakthrough seizures her there are reports.  Does have periods of blank stares and sleepiness however.  Patient has follow-up with neurology in the coming week, recommended that they be seen as soon as able for this.  Return precautions discussed.  - Follow-up with neurology in a week  - Increase Lamictal to 50 mg daily, (per original neurology recommendations)  - Continue Keppra 1000 mg tablet twice a day.        Options for treatment and/or follow-up care were reviewed with the patient who was engaged and actively involved in the decision making process and verbalized understanding of the options discussed and was satisfied with the final plan. Risks and benefits of plan were carefully reviewed.     I, Yasir Dunn, have discussed patient findings with attending physician Hafsa Moralez MD who was agreeable with plan.     Yasir Dunn, PGY2

## 2019-01-14 NOTE — NURSING NOTE
name: Ahsan Sy  Language: Jennifer  Agency: [employer]  Phone number: [phone]      Due to patient being non-English speaking/uses sign language, an  was used for this visit. Only for face-to-face interpretation by an external agency, date and length of interpretation can be found on the scanned worksheet.     name: Ahsan Sy  Agency: [employer]  Language: Jennifer   Telephone number: [phone]  Type of interpretation: Face-to-face, spoken

## 2019-01-14 NOTE — PROGRESS NOTES
Due to patient being non-English speaking/uses sign language, an  was used for this visit. Only for face-to-face interpretation by an external agency, date and length of interpretation can be found on the scanned worksheet.     name: Makayla Camargo  Agency: [employer]  Language: Jennifer   Telephone number: [phone]  Type of interpretation: Face-to-face, spoken

## 2019-01-14 NOTE — PATIENT INSTRUCTIONS
Thank you for coming to [facility] clinic for your care. It was a pleasure to take care of you!    - Plan to schedule ultrasound to look at baby.   - Continue taking medications for your seizure   - lamotrigine 2 tablets daily for the next 14 days   - Keppra 1 tablet 2 times a day  - Continue taking Sertraline everyday  - Plan to schedule your first OB visit.     Yasir Dunn MD

## 2019-02-04 DIAGNOSIS — Z32.01 PREGNANCY TEST POSITIVE: Primary | ICD-10-CM

## 2019-02-05 ENCOUNTER — RESULTS ONLY (OUTPATIENT)
Dept: FAMILY MEDICINE | Facility: CLINIC | Age: 29
End: 2019-02-05

## 2019-02-05 ENCOUNTER — ALLIED HEALTH/NURSE VISIT (OUTPATIENT)
Dept: FAMILY MEDICINE | Facility: CLINIC | Age: 29
End: 2019-02-05
Payer: COMMERCIAL

## 2019-02-05 ENCOUNTER — OFFICE VISIT (OUTPATIENT)
Dept: FAMILY MEDICINE | Facility: CLINIC | Age: 29
End: 2019-02-05
Payer: COMMERCIAL

## 2019-02-05 VITALS
HEIGHT: 61 IN | DIASTOLIC BLOOD PRESSURE: 62 MMHG | SYSTOLIC BLOOD PRESSURE: 96 MMHG | HEART RATE: 101 BPM | WEIGHT: 111.6 LBS | BODY MASS INDEX: 21.07 KG/M2 | TEMPERATURE: 97.9 F | RESPIRATION RATE: 16 BRPM

## 2019-02-05 DIAGNOSIS — B18.1 CHRONIC VIRAL HEPATITIS B WITHOUT DELTA AGENT AND WITHOUT COMA (H): ICD-10-CM

## 2019-02-05 DIAGNOSIS — O44.20 MARGINAL PLACENTA PREVIA: Primary | ICD-10-CM

## 2019-02-05 DIAGNOSIS — Z92.89 H/O TRANSFUSION OF PACKED RED BLOOD CELLS: ICD-10-CM

## 2019-02-05 DIAGNOSIS — Z3A.19 19 WEEKS GESTATION OF PREGNANCY: ICD-10-CM

## 2019-02-05 DIAGNOSIS — O09.90 HIGH-RISK PREGNANCY, UNSPECIFIED TRIMESTER: Primary | ICD-10-CM

## 2019-02-05 DIAGNOSIS — D56.3 BETA THALASSEMIA TRAIT: ICD-10-CM

## 2019-02-05 DIAGNOSIS — Z60.3 LANGUAGE BARRIER, CULTURAL DIFFERENCES: ICD-10-CM

## 2019-02-05 DIAGNOSIS — R56.9 SEIZURES (H): ICD-10-CM

## 2019-02-05 DIAGNOSIS — O09.90 SUPERVISION OF HIGH RISK PREGNANCY, ANTEPARTUM: ICD-10-CM

## 2019-02-05 DIAGNOSIS — R56.9 SEIZURE (H): ICD-10-CM

## 2019-02-05 DIAGNOSIS — O99.012 ANEMIA AFFECTING PREGNANCY IN SECOND TRIMESTER: ICD-10-CM

## 2019-02-05 DIAGNOSIS — F32.3 SEVERE SINGLE CURRENT EPISODE OF MAJOR DEPRESSIVE DISORDER, WITH PSYCHOTIC FEATURES (H): ICD-10-CM

## 2019-02-05 LAB
ALBUMIN SERPL BCP-MCNC: 3.4 G/DL (ref 3.5–5)
ALP SERPL-CCNC: 61 U/L (ref 45–120)
ALT SERPL W/O P-5'-P-CCNC: 15 U/L (ref 0–45)
AST SERPL-CCNC: 18 U/L (ref 0–40)
BACTERIA: NORMAL
BILIRUB DIRECT SERPL-MCNC: 0.2 MG/DL (ref 0–0.5)
BILIRUB SERPL-MCNC: 0.8 MG/DL (ref 0–1)
BILIRUBIN UR: NEGATIVE
BLOOD UR: NEGATIVE
CLUE CELLS: NORMAL
GLUCOSE URINE: NEGATIVE
HCT VFR BLD AUTO: 28 % (ref 35–47)
HEMOGLOBIN: 8.6 G/DL (ref 11.7–15.7)
HIV 1+2 AB+HIV1 P24 AG SERPL QL IA: NEGATIVE
KETONES UR QL: NEGATIVE
LEUKOCYTE ESTERASE UR: NEGATIVE
MCH RBC QN AUTO: 20.6 PG (ref 26.5–35)
MCHC RBC AUTO-ENTMCNC: 30.7 G/DL (ref 32–36)
MCV RBC AUTO: 67.1 FL (ref 78–100)
MOTILE TRICHOMONAS: NEGATIVE
NITRITE UR QL STRIP: NEGATIVE
ODOR: NORMAL
PH UR STRIP: 7.5 [PH] (ref 5–7)
PH WET PREP: NORMAL
PLATELET # BLD AUTO: 252 K/UL (ref 150–450)
PROT SERPL-MCNC: 6.7 G/DL (ref 6–8)
PROTEIN UR: NEGATIVE
RBC # BLD AUTO: 4.2 M/UL (ref 3.8–5.2)
SICKLE CELL SCREEN: NEGATIVE
SP GR UR STRIP: 1.01
UROBILINOGEN UR STRIP-ACNC: NORMAL
WBC # BLD AUTO: 12.6 K/UL (ref 4–11)
WBC WET PREP: NORMAL
YEAST: NORMAL

## 2019-02-05 RX ORDER — FERROUS SULFATE 325(65) MG
325 TABLET ORAL 2 TIMES DAILY
Qty: 90 TABLET | Refills: 3 | Status: SHIPPED | OUTPATIENT
Start: 2019-02-05 | End: 2020-06-26

## 2019-02-05 RX ORDER — LEVETIRACETAM 1000 MG/1
1000 TABLET ORAL DAILY
Qty: 30 TABLET | Refills: 1
Start: 2019-02-05 | End: 2019-03-06

## 2019-02-05 RX ORDER — LAMOTRIGINE 25 MG/1
50 TABLET ORAL DAILY
Qty: 60 TABLET | Refills: 1 | Status: SHIPPED | OUTPATIENT
Start: 2019-02-05 | End: 2020-06-26

## 2019-02-05 SDOH — ECONOMIC STABILITY: TRANSPORTATION INSECURITY
IN THE PAST 12 MONTHS, HAS LACK OF TRANSPORTATION KEPT YOU FROM MEETINGS, WORK, OR FROM GETTING THINGS NEEDED FOR DAILY LIVING?: NO

## 2019-02-05 SDOH — ECONOMIC STABILITY: FOOD INSECURITY: WITHIN THE PAST 12 MONTHS, THE FOOD YOU BOUGHT JUST DIDN'T LAST AND YOU DIDN'T HAVE MONEY TO GET MORE.: NEVER TRUE

## 2019-02-05 SDOH — ECONOMIC STABILITY: FOOD INSECURITY: WITHIN THE PAST 12 MONTHS, YOU WORRIED THAT YOUR FOOD WOULD RUN OUT BEFORE YOU GOT MONEY TO BUY MORE.: NEVER TRUE

## 2019-02-05 SDOH — SOCIAL STABILITY - SOCIAL INSECURITY: ACCULTURATION DIFFICULTY: Z60.3

## 2019-02-05 SDOH — ECONOMIC STABILITY: INCOME INSECURITY: HOW HARD IS IT FOR YOU TO PAY FOR THE VERY BASICS LIKE FOOD, HOUSING, MEDICAL CARE, AND HEATING?: NOT HARD AT ALL

## 2019-02-05 SDOH — ECONOMIC STABILITY: TRANSPORTATION INSECURITY
IN THE PAST 12 MONTHS, HAS THE LACK OF TRANSPORTATION KEPT YOU FROM MEDICAL APPOINTMENTS OR FROM GETTING MEDICATIONS?: NO

## 2019-02-05 ASSESSMENT — MIFFLIN-ST. JEOR: SCORE: 1173.59

## 2019-02-05 NOTE — PROGRESS NOTES
Past Medical History     Do you have a history of any of the following medical conditions?    Condition No/Yes/Details   Hypertension No    Heart disease, mitral valve prolapse, rheumatic fever No    Asthma or another chronic lung disease No    An autoimmune disorder No    Kidney disease No    Frequent urinary tract infections No    Epilepsy, seizures, or spells  YES Pt has epilepsy.  Last seizure end of December.  Taking Keppra and Lamictal.   Migraine headaches No    Stroke, loss of sensation/function, seizures, or other neuro problem No    Diabetes No    Thyroid problems or have you taken thyroid medication No    Hepatitis, liver disease, jaundice  YES Hepatitis B   Blood clots, phlebitis, pulmonary embolism or varicose veins No    Excessive bleeding after surgery or dental work No    Do you have more bleeding than other women after a cut or a scratch? No    Anemia No    Blood transfusions  [place]        Would you refuse a blood transfusion?       No   If yes, then ask next question. If no, skip next question.   Would you rather die than receive a blood transfusion? No    Breast problems No    Have you ever ? No - breast and bottle   Abnormalities of the uterus No    Abnormal pap smear No    Have you ever been treated for depression?  YES a little better, taking    Are you having problems with crying spells or loss of self-esteem?  YES little better   Have you ever required psychiatric care?  YES last was 12/2018   Have you been physically, sexually, or emotionally hurt by someone? No    Have you been in a major accident or suffered serious trauma? No    Have you ever had any gynecological surgical procedures such as cervical conization, LEEP, laser treatment, cryosurgery of the cervix, or a dilatation and curettage? No    Have you had any other surgical procedures? No         Have you ever had any complications from anesthesia? No    Have  you ever been hospitalized for a nonsurgical reason?  YES seizures and depression            Substance use and exposure     Does anyone in your home smoke? No    Do you use tobacco products or betel nut? No    Do you drink beer, wine, or hard liquor? No    Do you use any of the following: marijuana, speed, cocaine, heroin, hallucinogens, or other drugs? No               Symptoms since Last Menstrual Period     Do you currently have any of the following symptoms: No/Yes/Details        Abdominal pain No         Blood in the stools or urine No         Chest pain No         Shortness of breath No         coughing or vomiting up blood No         Your heart racing or skipping beats  YES with walking        Nausea or vomiting  YES improving         Pain on urination No         Vaginal discharge or bleeding No                Genetic Screening          Is the patient 35 years or older? No      Do you have a history of any of the following No/Yes/Details        A metabolic disorder (e.g. Insulin-dependent DM, PKU) No         Recurrent pregnancy loss or still birth No      Do you, the baby's father, or anyone in your families have          Thalassemia AND MCV <80 No         Hemophilia No         Neural tube defect No }        Congenital heart defect No         Sickle cell disease or trait No         Muscular dystrophy No         Cystic fibrosis No         Mental retardation or autism No         Down's syndrome No         Jose Manuel-Sach's disease No         Cassia's chorea No         Any other inherited genetic or chromosomal disorder No         A child with birth defects not listed above No                Infection History     Ever treated for tuberculosis or had a positive skin test No    Ever had genital herpes (or has your partner) No    Had a rash or viral illness since LMP No    Ever had a sexually transmitted infection No    Ever had chicken pox or the vaccine   YES as a baby   Have you had a sexual partner who is  HIV positive No    Ever had any other serious infectious disease No                Risk Assessment     Average Risk Category  No significant risk factors: Yes    At Risk Category (up to 3)  Teen pregnancy: No  Poor social situation: No  Domestic abuse: No  Financial difficulties: No  Smoker: No  H/O  deliver: No  H/O drug abuse: No  Non-English speaking: Yes  Advanced maternal age: No  GDM risks: No  Previous C/S: No  H/O PIH: No  H/O STIs: No  H/O mental health concerns: Yes  Onset care > 20 weeks: No      High Risk Category (4 or more At Risk or)  Diabetes/GDM: No  Multiple gestation: No  Chronic hypertension: No  Significant hx of asthma: No  Fetal demise > 20 weeks: No  Positive tox screen: No  Current mental health treatment: Yes      Risk: High Risk   Date determined: 19

## 2019-02-05 NOTE — NURSING NOTE
Due to patient being non-English speaking/uses sign language, an  was used for this visit. Only for face-to-face interpretation by an external agency, date and length of interpretation can be found on the scanned worksheet.     name: Teofilo Lewis  Agency: [employer]  Language: Jennifer   Telephone number: [phone]  Type of interpretation: Face-to-face, spoken

## 2019-02-05 NOTE — RESULT ENCOUNTER NOTE
Called and discussed with patient via Jennifer phone . Sent over 325 ferrous sulfate BID to patient's pharmacy as well. Discussed taking this medication with patient via telephone.

## 2019-02-05 NOTE — PROGRESS NOTES
First Obstetric Visit       HPI       Norman Watts is a 29 year old woman who presents for an initial prenatal visit at Unknown weeks of pregnancy with AMBER of 2019 by LMP of Patient's last menstrual period was 2018 (approximate)..      She has not had bleeding since her LMP.   She has had mild nausea.   Weight loss has occurred, for a total of 7 pounds total, but now is only 3 pounds less.   This was a planned pregnancy.     OTHER CONCERNS: No other concerns today.              Labor Risk Assessment     Is the patient's age <18 or >40?     No  Patint's BMI is Body mass index is 21.09 kg/m .   Does patient have a BMI < 18.5?     No  Prior delivery within 6 months?      No  Ever delivered prior to 37 weeks gestation?  No  Pregnancy occur via In Vitro Fertilization?   No  Are you carrying twins?       No    The patient has the following additional risk factors for  labor:   None, however is non-English speaking    none     Labor Risk Summary:  Pt is high risk for  Labor  Yes , is non-English speaking               Past Medical History     Past Medical History:   Diagnosis Date     Depression      History of blood transfusion      Seizure disorder (H)      See nurse history note, reviewed in detail.             Current Medications      Current Outpatient Medications   Medication Sig Dispense Refill     ferrous sulfate (FEROSUL) 325 (65 Fe) MG tablet Take 1 tablet (325 mg) by mouth 2 times daily 90 tablet 3     folic acid (FOLVITE) 400 MCG tablet Take 2 tablets (800 mcg) by mouth daily 180 tablet 3     lamoTRIgine (LAMICTAL) 25 MG tablet Take 2 tablets (50 mg) by mouth daily 60 tablet 1     levETIRAcetam (KEPPRA) 1000 MG tablet Take 1 tablet (1,000 mg) by mouth daily for 28 days 30 tablet 1     order for DME Equipment being ordered: Ensure once daily 30 each 1     Prenatal Vit-Fe Fumarate-FA (PRENATAL MULTIVITAMIN W/IRON) 27-0.8 MG tablet Take 1 tablet by mouth daily 90 tablet 3  "    sertraline (ZOLOFT) 50 MG tablet Take 1 tablet (50 mg) by mouth daily 30 tablet 1             Review of Systems      ROS:  YES - Headache, once or twice per week. No severe and takes Tylenol for them. Goes away with Tylenol.  No - Changes in vision  No - Chest Pain  No - Shortness of Breath  YES - Nausea   No - Vomiting  No - Abdominal pain   No - Contractions  No - Dysuria   No - Vaginal Discharge    No - Vaginal bleeding   No - Loss of Fluid   No - Extremity swelling     ====================================================           Physical Exam      BP 96/62   Pulse 101   Temp 97.9  F (36.6  C)   Resp 16   Ht 1.549 m (5' 1\")   Wt 50.6 kg (111 lb 9.6 oz)   LMP 11/30/2018 (Approximate)   BMI 21.09 kg/m    GENERAL: healthy, alert and no distress  NECK: no tenderness, no adenopathy, no asymmetry  RESP: lungs clear to auscultation - no rales, no rhonchi, no wheezes  CV: regular rates and rhythm, normal S1 S2, no S3 or S4 and no murmur, no click or rub -  ABDOMEN: soft, no tenderness, no  hepatosplenomegaly, no masses, normal bowel sounds  MS: extremities- no gross deformities noted, no edema  No scars noted..   GENITALIA:  BUS WNL, no lesions noted   VAGINA:  Pink, normal rugae and discharge copious  CERVIX:  smooth, without discharge or CMT and nulliparous os,   firm/ closed.  UTERUS: nontender 20 weeks in size.  ADNEXA: Unable to assess    Past labs reviewed:  Varicella immune: needs titre  Hepatitis B immune has history of HBV infection  Last pap never.  She is due for this today.    =========================================         Assessment and Plan     Norman was seen today for prenatal care.    Diagnoses and all orders for this visit:    High-risk pregnancy, 19 weeks of gestation in pregnancy: Initiating care with our clinic and will transfer care to clinic in [place] at the end of April.  Has lost weight during the pregnancy as well and discussed taking prescription for Ensure to her WIC " appointment.  -     ABO/Rh Type-HML ([facility])  -     Antibody Screen ([facility])  -     Chlamydia/Gono Amplified ([facility])  -     CBC with Plt ([facility])  -     Culture Urine ([facility])  -     Hepatitis B Surface Ag ([facility])  -     HIV Ag/Ab Screen [place] ([facility])  -     Lead, Blood ([facility])  -     Rubella  IgG ([facility])  -     Syphilis Screen [place] ([facility])  -     Urinalysis([facility])  -     Hepatitis B DNA Detect and Quant ([facility])  -     Hepatitis Be Agn ([facility])  -     AFP 4-Marker Scn ([facility])  -     Hepatic Profile ([facility])  -     order for DME; Equipment being ordered: Ensure once daily  -     Sickle Cell Screen ([facility])  -     Hemoglobinopathy/Thal Javier ([facility])  -     GASTROENTEROLOGY ADULT REF CONSULT ONLY  -     GYN Cytology ([facility])    Anemia affecting pregnancy in second trimester: Found to have hemoglobin of 8.6 in clinic today.  Discussed with patient and send over prescription for ferrous sulfate to her pharmacy.  We will follow-up in the next appointment.  Upon chart review appears to be iron deficiency anemia.  -     ferrous sulfate (FEROSUL) 325 (65 Fe) MG tablet; Take 1 tablet (325 mg) by mouth 2 times daily  -     Hemoglobinopathy/Thal Javier ([facility])  -     Hgb check at next appointment    Seizures (H): Hospitalized in December for recurrent seizure.  This was when she found out she was pregnant.  Is currently following with a neurologist.  Does have a follow-up appointment planned with them.  The plan is to keep her on the levetiracetam Terry until the lamotrigine is therapeutic.  -     lamoTRIgine (LAMICTAL) 25 MG tablet; Take 2 tablets (50 mg) by mouth daily  -     order for DME; Equipment being ordered: Ensure once daily  -     levETIRAcetam (KEPPRA) 1000 MG tablet; Take 1 tablet (1,000 mg) by mouth daily for 28 days    Chronic viral hepatitis B without delta agent and without coma (H): We will need to see gastroenterologist for  management during pregnancy. Will update prenatal care when recommendations are available.  -     GASTROENTEROLOGY ADULT REF CONSULT ONLY        29 year old  at 19w4d with AMBER of 2019 by 16w ultrasound.    Pregnancy Risk Assessment: High Risk pregnancy  Discussed high risk conditions as follows: non-English speaking    -Dating US obtained and dating confirmed?   YES  -Taking PNV/Folate?     YES      - Ordered new OB labs: blood type and antibody screen, HIV, VDRL, hep B, rubella, UA/UC, gonorrhea/chlamydia, Pap smear, Hepatitis B immunity, Varicella immunity and Wet prep done today.    -Discussed risks and benefits of second trimester quad screen for trisomies between 15-20 weeks EGA, patient agreed. Will obtain when appropriate.   - Discussed genetic screening. Patient does want to pursue screening.   - Discussed screening for sickle cell anemia. Patient does  want to pursue Hb electrophoresis.     - Discussed early screening for gestational diabetes. The patient does not have a history of GDM, BMI>30, h/o prediabetes/glucose intolerance, first degree relative with GDM or DM, or chronic HTN, so WILL NOT obtain early GCT or A1c.    - The patient does not h/o severe, early preeclampsia with delivery <34weeks, preeclampsia in more than one pregnancy, pre-gestational diabetes, chronic hypertension, renal disease, or autoimmune disease so WILL NOT start low dose aspirin (81mg) and calcium supplementation (1g-1.5g/day elemental = 2500mg- 3750mg/day Calcium carbonate) to prevent preeclampsia.    - The patient  does not have a history of spontaneous  birth so  WILL NOT consider progesterone starting at 16-20 weeks and/or serial transvaginal cervical length ultrasounds from 16-24 weeks.     - Prenatal vitamins ordered.  - Flu shot offered and was declined due to having shot at [facility] during hospitalization.    Counseling given:   - Follow up in 2-3 weeks for return OB visit.  - Recommended weight gain  for pregnancy: 25-35 lbs (pregravid BMI 18.5-24.9)      - Instructed on best evidence for: healthy diet and foods to avoid; exercise and activity during pregnancy; avoiding exposure to toxoplasmosis; safe use of seatbelts during pregnancy; and maintenance of a generally healthy lifestyle  -Patient to see OB educator/ RN today and/or next visit.    Discussed the harms, benefits, side effects and alternative therapies for current prescribed and OTC medications.      Patient Instructions   Go to ultrasound next week   Follow up with me in 2-3 weeks  We will call you to schedule appointment with GI    GASTROENTEROLOGY ADULT REF PROCEDURE ONLY  February 5, 2019 at 11:25 am Online referral placed with [facility] who will contact patient to schedule. [facility] Gastroenter[facility]  Phone [phone]  Fax: [phone]      Options for treatment and follow-up care were reviewed with the patient and/or guardian. Norman MAGDALENO Moo and/or guardian engaged in the decision making process and verbalized understanding of the options discussed and agreed with the final plan.    Lorenza Sierra MD PGY1  I precepted today with Chiquis Lozada MD.

## 2019-02-05 NOTE — PATIENT INSTRUCTIONS
Go to ultrasound next week   Follow up with me in 2-3 weeks  We will call you to schedule appointment with GI    GASTROENTEROLOGY ADULT REF PROCEDURE ONLY  February 5, 2019 at 11:25 am Online referral placed with [facility] who will contact patient to schedule. [facility] Gastroenterology  Phone [phone]  Fax: [phone]

## 2019-02-05 NOTE — PROGRESS NOTES
"Preceptor attestation:  Vital signs reviewed: BP 96/62   Pulse 101   Temp 97.9  F (36.6  C)   Resp 16   Ht 1.549 m (5' 1\")   Wt 50.6 kg (111 lb 9.6 oz)   LMP 11/30/2018 (Approximate)   BMI 21.09 kg/m      Patient seen, evaluated, and discussed with the resident.  I have verified the content of the note, which accurately reflects my assessment of the patient and the plan of care.    Supervising physician: Chiquis Lozada MD  [facility]  "

## 2019-02-06 LAB
C TRACH RRNA SPEC QL NAA+PROBE: NEGATIVE
COLLECTION METHOD: NORMAL
CULTURE: NORMAL
HEMOGLOBIN A2: 5.3 % (ref 2.2–3.5)
HEMOGLOBIN ELECTROPHORESIS: ABNORMAL
HEMOGLOBIN F: 2.1 % (ref 0–2)
INTERPRETED BY: ABNORMAL
LEAD BLD-MCNC: NORMAL UG/DL
LEAD RETEST: NO
N GONORRHOEA RRNA SPEC QL NAA+PROBE: NEGATIVE
PATH ICD: ABNORMAL
RUBV IGG SERPL QL IA: POSITIVE
TREPONEMA ANTIBODY (SYPHILIS): NEGATIVE

## 2019-02-07 ENCOUNTER — RECORDS - HEALTHEAST (OUTPATIENT)
Dept: ADMINISTRATIVE | Facility: OTHER | Age: 29
End: 2019-02-07

## 2019-02-07 PROBLEM — D56.3 BETA THALASSEMIA TRAIT: Status: ACTIVE | Noted: 2019-02-06

## 2019-02-07 LAB
CYTOLOGY CVX/VAG DOC THIN PREP: NORMAL
HEPATITIS BE AGN: NEGATIVE
HIGH RISK?: NO
HPV REFLEX?: NORMAL
LAB AP ABNORMAL BLEEDING: NO
LAB AP BIRTH CONTROL/HORMONES: NORMAL
LAB AP CERVICAL APPEARANCE: NORMAL
LAB AP PATIENT STATUS: NORMAL
LAB AP PREVIOUS ABNL: NORMAL
LAB AP PREVIOUS NORMAL: NORMAL
LAST MENS PERIOD: NORMAL
PATH REPORT.COMMENTS IMP SPEC: NORMAL
PATH REPORT.COMMENTS IMP SPEC: NORMAL
SPECIMEN ADEQUACY:: NORMAL

## 2019-02-08 PROBLEM — B18.1 CHRONIC VIRAL HEPATITIS B WITHOUT DELTA AGENT AND WITHOUT COMA (H): Status: ACTIVE | Noted: 2017-05-31

## 2019-02-08 PROBLEM — O44.20 MARGINAL PLACENTA PREVIA: Status: ACTIVE | Noted: 2019-02-08

## 2019-02-08 PROBLEM — F32.3 SEVERE SINGLE CURRENT EPISODE OF MAJOR DEPRESSIVE DISORDER, WITH PSYCHOTIC FEATURES (H): Status: ACTIVE | Noted: 2018-02-22

## 2019-02-08 PROBLEM — F41.9 ANXIETY: Status: RESOLVED | Noted: 2017-12-05 | Resolved: 2019-02-08

## 2019-02-08 PROBLEM — Z30.8 ENCOUNTER FOR OTHER CONTRACEPTIVE MANAGEMENT: Status: RESOLVED | Noted: 2018-02-23 | Resolved: 2019-02-08

## 2019-02-08 PROBLEM — F32.A DEPRESSION: Status: RESOLVED | Noted: 2017-10-12 | Resolved: 2019-02-08

## 2019-02-08 PROBLEM — O09.90 SUPERVISION OF HIGH RISK PREGNANCY, ANTEPARTUM: Status: ACTIVE | Noted: 2019-02-08

## 2019-02-08 PROBLEM — Z60.3 LANGUAGE BARRIER, CULTURAL DIFFERENCES: Status: ACTIVE | Noted: 2019-02-05

## 2019-02-08 LAB
AFP MOM: 1.41 MOM
ALPHA FETO PROTEIN: 94.1 NG/ML
CALCULATED AGE AT EDD: NORMAL
COLLECTION DATE: NORMAL
CURRENT CIGARETTE SMOKING STATUS: NORMAL
DOWN SYNDROME MATERNAL AGE RISK: NORMAL
DOWN SYNDROME SCREEN RISK ESTIMATE: NORMAL
EDD BY U/S SCAN: NORMAL
GA ON COLLECTION BY U/S SCAN: NORMAL WK,D
GA USED IN RISK ESTIMATE: NORMAL
GENERAL TEST INFORMATION: NORMAL
HBV SURFACE AG SERPL QL IA: ABNORMAL
HCG, TOTAL MOM: 1.41 MOM
HCG, TOTAL: 32.4 IU/ML
HEP B VIRUS DNA QUANT IU/ML: 89 [IU]/ML
HEP B VIRUS DNA QUANT LOG IU/ML: 1.9 {LOG_IU}/ML
INHIBIN MOM: 0.93 MOM
INHIBIN: 190 PG/ML
INITIAL OR REPEAT TESTING: NORMAL
INSULIN DIABETIC: NO
INTERPRETATION: NORMAL
IVF PREGNANCY: NO
LEAD BLDV-MCNC: 2 UG/DL (ref 0–4.9)
Lab: NORMAL
NEURAL TUBE DEFECT RISK ESTIMATE: NORMAL
NUMBER OF CHORIONS: NORMAL
NUMBER OF FETUSES:: 1
PATIENT OR FATHER OF BABY HAS A NTD: NORMAL
PATIENT WEIGHT: 111 LBS
PHYSICIAN PHONE NUMBER: NORMAL
PREV DOWN (T21) / TRISOMY PREGNANCY: NORMAL
PREV PREGNANCY W/ NEURAL TUBE DEFECT: NORMAL
PT DATE OF BIRTH: NORMAL
RACE OF MOTHER: NORMAL
RECOMMENDED FOLLOW UP: NORMAL
TRISOMY 18 SCREEN RISK ESTIMATE: NORMAL
UE3 MOM: 0.59 MOM
UE3: 1.18 NG/ML

## 2019-02-08 NOTE — NURSING NOTE
Family Medicine OB Education    I provided the following OB education to Norman Watts.    Discussed that Dr Sierra should be the doctor that she sees for her prenatal visits.  Pt plans to move to [place], [place] in April 2019 to be closer to family.  Discussed the importance of being seen here regularly until moves and then establishing care there as soon as possible. Sheet given and discussed fetal growth and development.  Sheet given and discussed warning signs with reasons to call clinic, L&D, or 24 hr HE  line with questions or concerns (phone numbers given).  Sheet given and discussed Tdap to be given after 27 weeks gestation and the importance of family members get immunized before delivery.  Proof of pregnancy completed and given to pt.  See questionnaire and pregnancy risk assessment for further information in provider encounter.     Legal Screener completed and there were no concerns for government benefits, housing, or immigration.      Name of provider who requested the OB education: Dr Sierra  Name of provider on site (faculty or community preceptor) at the time of performing the OB education: Dr Neville Carmona, RN BSN

## 2019-02-11 DIAGNOSIS — O09.90 SUPERVISION OF HIGH RISK PREGNANCY, ANTEPARTUM: ICD-10-CM

## 2019-02-11 DIAGNOSIS — O09.90 SUPERVISION OF HIGH RISK PREGNANCY, ANTEPARTUM: Primary | ICD-10-CM

## 2019-02-11 NOTE — LETTER
February 14, 2019      Norman Watts  [address]  [place] [address]        Dear Norman,    I hope you're well. I wanted to communicate with you the results of the tests that we did.     The OB ultrasound that we completed showed that everything looked good. We can discuss this further at your next visit.     Please let me know if you have any other questions or concerns.     If you have any questions, please call the clinic to make an appointment.    Sincerely,    Blanche Ferrara

## 2019-02-11 NOTE — PROGRESS NOTES
Due to patient being non-English speaking/uses sign language, an  was used for this visit. Only for face-to-face interpretation by an external agency, date and length of interpretation can be found on the scanned worksheet.     name: Teofilo Lewis  Agency: [employer]  Language: Jennifer  Telephone number: [phone]  Type of interpretation: Face-to-face, spoken

## 2019-02-12 NOTE — RESULT ENCOUNTER NOTE
Please have Jennifer  call with the following results.    Dear Norman Watts,     The results from your prenatal testing showed that you have hepatitis B, which we already knew about. The referral for the gastroenterologist was placed at your last visit and we recommend seeing them. You also have beta thalassemia, which is an abnormal synthesis of your hemoglobin. We can talk about this more at your next visit, but it means that you likely have a slight anemia when you're not pregnant. The rest of your prenatal testing was normal and we can talk about any other questions you have at your next OB visit.    Thank you for allowing me to be a part of your health care!    Sincerely,   Dr. Sierra  2/12/2019

## 2019-02-15 ENCOUNTER — TELEPHONE (OUTPATIENT)
Dept: FAMILY MEDICINE | Facility: CLINIC | Age: 29
End: 2019-02-15

## 2019-02-15 DIAGNOSIS — O09.92 SUPERVISION OF HIGH RISK PREGNANCY IN SECOND TRIMESTER: Primary | ICD-10-CM

## 2019-02-15 PROBLEM — O44.20 MARGINAL PLACENTA PREVIA: Status: RESOLVED | Noted: 2019-02-08 | Resolved: 2019-02-15

## 2019-02-15 LAB
ABO + RH BLD: NORMAL
BLD GP AB SCN SERPL QL: NORMAL
REPEAT ABO/RH TYPING (HML): NORMAL

## 2019-02-15 NOTE — TELEPHONE ENCOUNTER
Spoke with Magalys at  Lab.  She states that they have 2 patients with the same name and birth date in HE system and once realized that blood was rerun under the correct patient but were unable to rerun the blood bank labs (ABO/RH and luz titer).  Magalys will remove results from the system and will credit pt's insurance.  Told Magalys that pt has an appt to be seen on 2/26 and blood will be redrawn then.    Future orders placed for ABO/RH and luz screen to be drawn at next visit.  Routed note to Dr Sierra.    Called Dane Lewis (I) and asked her to let pt know at the future appointment that we would do lab work at her next visit./NG

## 2019-02-15 NOTE — TELEPHONE ENCOUNTER
[facility] called, stated that blood got mix with another patient at [facility].  This patient need to have the following blood re-draw.  Antibody screen and ABO/Rh Type.  Patient have an appointment on 2/26/2019, will need to collect blood that day.

## 2019-02-26 ENCOUNTER — OFFICE VISIT (OUTPATIENT)
Dept: FAMILY MEDICINE | Facility: CLINIC | Age: 29
End: 2019-02-26
Payer: COMMERCIAL

## 2019-02-26 VITALS
WEIGHT: 115.4 LBS | TEMPERATURE: 98 F | BODY MASS INDEX: 21.79 KG/M2 | OXYGEN SATURATION: 100 % | SYSTOLIC BLOOD PRESSURE: 88 MMHG | RESPIRATION RATE: 12 BRPM | HEART RATE: 82 BPM | DIASTOLIC BLOOD PRESSURE: 55 MMHG | HEIGHT: 61 IN

## 2019-02-26 DIAGNOSIS — R11.0 NAUSEA: Primary | ICD-10-CM

## 2019-02-26 DIAGNOSIS — R56.9 SEIZURE (H): ICD-10-CM

## 2019-02-26 DIAGNOSIS — O09.92 SUPERVISION OF HIGH RISK PREGNANCY IN SECOND TRIMESTER: ICD-10-CM

## 2019-02-26 LAB
BASOPHILS # BLD AUTO: 0 THOU/UL (ref 0–0.2)
BASOPHILS NFR BLD AUTO: 0 % (ref 0–2)
EOSINOPHIL # BLD AUTO: 0 THOU/UL (ref 0–0.4)
EOSINOPHIL NFR BLD AUTO: 0 % (ref 0–6)
ERYTHROCYTE [DISTWIDTH] IN BLOOD BY AUTOMATED COUNT: 18.6 % (ref 11–14.5)
HCT VFR BLD AUTO: 26.7 % (ref 35–47)
HGB BLD-MCNC: 8.3 G/DL (ref 12–16)
LYMPHOCYTES # BLD AUTO: 1.2 THOU/UL (ref 0.8–4.4)
LYMPHOCYTES NFR BLD AUTO: 10 % (ref 20–40)
MCH RBC QN AUTO: 20.6 PG (ref 27–34)
MCHC RBC AUTO-ENTMCNC: 31.1 G/DL (ref 32–36)
MCV RBC AUTO: 66 FL (ref 80–100)
MONOCYTES # BLD AUTO: 0.5 THOU/UL (ref 0–0.9)
MONOCYTES NFR BLD AUTO: 4 % (ref 2–10)
NEUTROPHILS # BLD AUTO: 10.3 THOU/UL (ref 2–7.7)
NEUTROPHILS NFR BLD AUTO: 86 % (ref 50–70)
PLATELET # BLD AUTO: 219 THOU/UL (ref 140–440)
PMV BLD AUTO: 12.2 FL (ref 8.5–12.5)
RBC # BLD AUTO: 4.03 MILL/UL (ref 3.8–5.4)
WBC # BLD AUTO: 12.3 THOU/UL (ref 4–11)

## 2019-02-26 RX ORDER — PYRIDOXINE HCL (VITAMIN B6) 25 MG
25 TABLET ORAL DAILY
Qty: 90 TABLET | Refills: 3 | Status: SHIPPED | OUTPATIENT
Start: 2019-02-26 | End: 2020-06-26

## 2019-02-26 ASSESSMENT — PATIENT HEALTH QUESTIONNAIRE - PHQ9: SUM OF ALL RESPONSES TO PHQ QUESTIONS 1-9: 10

## 2019-02-26 ASSESSMENT — MIFFLIN-ST. JEOR: SCORE: 1182.89

## 2019-02-26 NOTE — Clinical Note
I forwarded this to Jackson already, but just wanted to let you know. It looks like she was able to schedule another appt with me prior to moving. Hopefully we can obtain records from her neurologist to put in our note as well.

## 2019-02-26 NOTE — NURSING NOTE
Due to patient being non-English speaking/uses sign language, an  was used for this visit. Only for face-to-face interpretation by an external agency, date and length of interpretation can be found on the scanned worksheet.     name: Mica  Agency: [employer]  Language: Jennifer   Telephone number: [phone]  Type of interpretation: Face-to-face, spoken

## 2019-02-26 NOTE — PROGRESS NOTES
"Subjective   Concerns:  Would like to go to [place] [facility] for further ultrasound due to choroid plexus cyst found on 16w3d ultrasound. Moving to [place] in about 2 weeks. Is taking iron pills twice per day and has been taking them for about 1 week.     ROS:  No - Headache, but does feel dizzy at times  YES - Changes in vision, sometimes her vision is a little blurry, no daily just once or twice  No - Chest Pain  No - Shortness of Breath  YES - Nausea, all the time  No - Vomiting, will dry heave at times  No - Abdominal pain   No - Contractions  No - Dysuria   No - Vaginal Discharge    No - Vaginal bleeding   No - Loss of Fluid   No - Extremity swelling   Present - Fetal movement       Patient Active Problem List   Diagnosis     Seizure (H)     Chronic viral hepatitis B without delta agent and without coma (H)     Anxiety disorder, unspecified type     Severe single current episode of major depressive disorder, with psychotic features (H)     Beta thalassemia trait     Language barrier, cultural differences     H/O transfusion of packed red blood cells     Supervision of high risk pregnancy, antepartum     Choroid plexus cyst of fetus, single or unspecified fetus       Norman Watts speaks Jennifer so an  was used today.    Guidance:  Nausea  OTC medications  genetic testing  weight gain and exercise    Going to WIC? Yes      Objective  BP (!) 88/55 (BP Location: Left arm, Patient Position: Sitting, Cuff Size: Adult Regular)   Pulse 82   Temp 98  F (36.7  C) (Oral)   Resp 12   Ht 1.537 m (5' 0.5\")   Wt 52.3 kg (115 lb 6.4 oz)   LMP 11/30/2018 (Approximate)   SpO2 100%   BMI 22.17 kg/m    No distress.  Gravid abdomen.  .  Fundal height 24 cm.  no edema.    Results  Blood type: See Note.  Results for orders placed or performed in visit on 02/26/19   CBC w/ Diff and Plt ([facility])   Result Value Ref Range    WBC 12.3 (H) 4.0 - 11.0 thou/uL    RBC 4.03 3.80 - 5.40 mill/uL    Hemoglobin 8.3 (L) " 12.0 - 16.0 g/dL    Hematocrit 26.7 (L) 35.0 - 47.0 %    MCV 66 (L) 80 - 100 fL    MCH 20.6 (L) 27.0 - 34.0 pg    MCHC 31.1 (L) 32.0 - 36.0 g/dL    RDW 18.6 (H) 11.0 - 14.5 %    Platelets 219 140 - 440 thou/uL    Mean Platelet Volume 12.2 8.5 - 12.5 fL    % Neutrophils 86 (H) 50 - 70 %    % Lymphocytes 10 (L) 20 - 40 %    % Monocytes 4 2 - 10 %    % Eosinophils 0 0 - 6 %    % Basophils 0 0 - 2 %    Neutrophils (Absolute) 10.3 (H) 2.0 - 7.7 thou/uL    Lymphs (Absolute) 1.2 0.8 - 4.4 thou/uL    Monocytes(Absolute) 0.5 0.0 - 0.9 thou/uL    Eos (Absolute) 0.0 0.0 - 0.4 thou/uL    Baso (Absolute) 0.0 0.0 - 0.2 thou/uL    Narrative    Test performed by:  [facility] [address] [place] [address]       Assessment & Plan  29 year old  at 22w4d with AMBER 2019 based on 16w3d week US    Paw was seen today for prenatal care.    Nausea: has ongoing nausea which makes it hard for her to eat. Her  is worried about her taking more medications, but we discussed that B6 is a vitamin. We also talked about drinking thea tea as well as that can help with nausea for some patient.  -     vitamin B6 (PYRIDOXINE) 25 MG tablet; Take 1 tablet (25 mg) by mouth daily    Supervision of high risk pregnancy in second trimester  -     [facility]  -     vitamin B6 (PYRIDOXINE) 25 MG tablet; Take 1 tablet (25 mg) by mouth daily  -     CBC w/ Diff and Plt [facility])  -     Needs varicella titre    History of hepatitis B infection: following annually by gastroenterology. Would like have her see [facility] prior to moving to [place] for pregnancy and delivery plans, however this is not possible.        -      See gastroenterologist in [place] prior to delivery of infant    Seizures: Hospitalized in 2018. This is when she found out she was pregnant. Should follow up with them on 3/8/19 prior to moving to [place]. She is on a levetiracetam taper until she is therapeutic on lamotrigine.  She is also on a higher dose of folic acid for this reason as well. [facility] if patient does not have them with her during move for more information.    Anemia: hemoglobin has been 8.6 for first and 8.3 for second OB appointment. Patient is taking 325 ferrous sulfate BID. Her hemoglobin should also be followed throughout her pregnancy.       -      Check hgb at next OB visit       -      Continue ferrous sulfate 325 mg BID    Choroid plexus cyst of fetus: found on 19w ultrasound. Discussed with patient and . They would like to proceed to [place] [facility] for ultrasound and assessment there.        -       [facility] referral    Weight gain inadequate: 0.635 kg (1 lb 6.4 oz) to date, out of recommended total of 25-35 lbs (pregravid BMI 18.5-24.9)      Return to clinic in 2-4 weeks if possible prior to moving to [place].    Lorenza Sierra MD  I precepted today with Julius Paz MD.

## 2019-02-27 LAB — BLD GP AB SCN SERPL QL: NEGATIVE

## 2019-02-27 NOTE — PATIENT INSTRUCTIONS
[place]  [facility]  19 Referral and prenatal records faxed to [place]  at [phone].  They will review and call pt to schedule appt./NG

## 2019-02-28 LAB
ABO + RH BLD: NORMAL
REPEAT ABO/RH TYPING (HML): NORMAL

## 2019-03-06 ENCOUNTER — OFFICE VISIT (OUTPATIENT)
Dept: FAMILY MEDICINE | Facility: CLINIC | Age: 29
End: 2019-03-06
Payer: COMMERCIAL

## 2019-03-06 VITALS
BODY MASS INDEX: 21.82 KG/M2 | HEART RATE: 91 BPM | RESPIRATION RATE: 16 BRPM | TEMPERATURE: 98.5 F | OXYGEN SATURATION: 99 % | WEIGHT: 113.6 LBS | DIASTOLIC BLOOD PRESSURE: 58 MMHG | SYSTOLIC BLOOD PRESSURE: 93 MMHG

## 2019-03-06 DIAGNOSIS — R56.9 SEIZURES (H): ICD-10-CM

## 2019-03-06 DIAGNOSIS — O09.92 SUPERVISION OF HIGH RISK PREGNANCY IN SECOND TRIMESTER: ICD-10-CM

## 2019-03-06 LAB — VZV IGG SER QL IF: POSITIVE

## 2019-03-06 RX ORDER — LEVETIRACETAM 1000 MG/1
1000 TABLET ORAL DAILY
Qty: 3 TABLET | Refills: 1 | Status: SHIPPED | OUTPATIENT
Start: 2019-03-06 | End: 2020-06-26

## 2019-03-06 RX ORDER — FOLIC ACID, .BETA.-CAROTENE, ASCORBIC ACID, CHOLECALCIFEROL, .ALPHA.-TOCOPHEROL ACETATE, DL-, THIAMINE MONONITRATE, RIBOFLAVIN, NIACINAMIDE, PYRIDOXINE HYDROCHLORIDE, CYANOCOBALAMIN, CALCIUM PANTOTHENATE, CALCIUM CARBONATE, FERROUS FUMARATE, AND ZINC OXIDE 1; 1000; 100; 400; 30; 3; 3; 15; 20; 12; 7; 200; 29; 20 MG/1; [IU]/1; MG/1; [IU]/1; [IU]/1; MG/1; MG/1; MG/1; MG/1; UG/1; MG/1; MG/1; MG/1; MG/1
1 TABLET, CHEWABLE ORAL DAILY
Qty: 90 TABLET | Refills: 1 | Status: SHIPPED | OUTPATIENT
Start: 2019-03-06 | End: 2020-06-26

## 2019-03-06 NOTE — PROGRESS NOTES
Preceptor attestation:  Vital signs reviewed: BP 93/58   Pulse 91   Temp 98.5  F (36.9  C) (Oral)   Resp 16   Wt 51.5 kg (113 lb 9.6 oz)   LMP 11/30/2018 (Approximate)   SpO2 99%   BMI 21.82 kg/m      Patient seen, evaluated, and discussed with the resident.  I have verified the content of the note, which accurately reflects my assessment of the patient and the plan of care.    Supervising physician: Chiquis Lozada MD  [facility]

## 2019-03-06 NOTE — NURSING NOTE
.Due to patient being non-English speaking/uses sign language, an  was used for this visit. Only for face-to-face interpretation by an external agency, date and length of interpretation can be found on the scanned worksheet.     name: Dane rFaire  Agency: [employer]  Language: Jennifer   Telephone number: [phone]  Type of interpretation: Face-to-face, spoken

## 2019-03-06 NOTE — PROGRESS NOTES
Subjective  Concerns: She needs a refill on her Keppra until can see neurologist on Friday. This looks like the plan is taper to another medication. She isn't taking prenatal because it tastes gross after she swallows it..     ROS:  YES - Headache, heavy head at times, doesn't think it's a pain though  No - Changes in vision  No - Chest Pain  No - Shortness of Breath  YES - Nausea, still ongoing, hot teas helped with this  No - Vomiting  No - Abdominal pain   No - Contractions  No - Dysuria   No - Vaginal Discharge    No - Vaginal bleeding   No - Loss of Fluid   No - Extremity swelling   Present - Fetal movement     Going to WIC? Yes    Risk Assessment   Average Risk Category  No significant risk factors: No    At Risk Category (up to 3)  Teen pregnancy: No  Poor social situation: No  Domestic abuse: No  Financial difficulties: No  Smoker: No  H/O  deliver: No  H/O drug abuse: No  Non-English speaking: Yes  Advanced maternal age: No  GDM risks: No  Previous C/S: No  H/O PIH: No  H/O STIs: No  H/O mental health concerns: No  Onset care > 20 weeks: No  Other: Hep B and siezures    High Risk Category (4 or more At Risk or)  Diabetes/GDM: No  Multiple gestation: No  Chronic hypertension: No  Significant hx of asthma: No  Fetal demise > 20 weeks: No  Positive tox screen: No  Current mental health treatment: No  Other: Current seizure and hepatitis infection    Risk: High Risk   Date determined: 3/6/2019    Patient Active Problem List   Diagnosis     Seizure (H)     Chronic viral hepatitis B without delta agent and without coma (H)     Anxiety disorder, unspecified type     Severe single current episode of major depressive disorder, with psychotic features (H)     Beta thalassemia trait     Language barrier, cultural differences     H/O transfusion of packed red blood cells     Supervision of high risk pregnancy, antepartum     Choroid plexus cyst of fetus, single or unspecified fetus       Norman Watts speaks Jennifer  so an  was used today.    Guidance: signs of  labor    Objective  BP 93/58   Pulse 91   Temp 98.5  F (36.9  C) (Oral)   Resp 16   Wt 51.5 kg (113 lb 9.6 oz)   LMP 2018 (Approximate)   SpO2 99%   BMI 21.82 kg/m    No distress.  Gravid abdomen.  .  Fundal height 26 cm.  no edema.    Results  Blood type: O POS  Results for orders placed or performed in visit on 19   Carolina Zoster Imm Status Haydee ([facility])   Result Value Ref Range    V.zoster Immune Status Positive     Narrative    Test performed by:  [facility] [address] [place] [address]  Assay interference due to circulating antibodies against HIV, Hepatitis A,   Hepatitis B, Hepatitis C, HAMA and Rheumatoid Factor has not been evaluated.  The assay performance in detecting antibodies to Varicella Zoster in   individuals vaccinated with the FDA-licensed VZV vaccine has not been   established.  Negative: Absence of detectable Varicella Zoster IgG antibodies. A negative   result indicates no detectable VZV antibody, but does not rule out acute   infection. It should be noted that the test usually scores negative in   infected patients during the incubation period and the early stages of   infection.  Equivocal: Suggest recollection.  Positive: Presence of detectable Varicella Zoster IgG antibodies. A positive   result generally indicates exposure to the pathogen or administration of   specific immune-globulins, but it is no indication of active infection or   stage of disease.       Assessment & Plan  29 year old  at 23w5d with AMBER 2019 based on 16w3d US.    Paw was seen today for prenatal care.    Diagnoses and all orders for this visit:    Supervision of high risk pregnancy in second trimester: stopped taking prenatals due to taste so discussed switching to a chewable tablet. Emphasized the importance of taking this medication during her pregnancy. Has level 2 ultrasound scheduled with  [facility] prior to moving to [place]. She does have a growth and measuring discrepancy, however she will be seeing a perinatologist already next week. Encouraged patient to keep drinking warm elizabeth and thea teas to help with nausea. We also talked about finding an OB provider as soon as she is in [place]. We went over signs of  labor as well. She received a few of our office cards to give to her provider in [place] for records request and copies of her prenatal records up until today's visit.  -     Carolina Zoster Imm Status Haydee ([facility])  -     Prenatal Vit-Fe Fumarate-FA (PRENATAL 19) 29-1 MG CHEW; Take 1 tablet by mouth daily    Seizures (H): followed by neurologist for this. Initial plan looks like to taper the Keppra but she ran out prior to her visit. Will prescribe 3 more days so she can make it to her neurology appointment on Friday.  -     levETIRAcetam (KEPPRA) 1000 MG tablet; Take 1 tablet (1,000 mg) by mouth daily    Weight gain inadequate: -0.181 kg (-6.4 oz) to date, out of recommended total of 25-35 lbs (pregravid BMI 18.5-24.9)      Establish care with new OB as soon as in [place].    Lorenza Sierra MD  I precepted today with Chiquis Lozada MD.

## 2019-03-08 ENCOUNTER — TRANSFERRED RECORDS (OUTPATIENT)
Dept: HEALTH INFORMATION MANAGEMENT | Facility: CLINIC | Age: 29
End: 2019-03-08

## 2019-03-08 DIAGNOSIS — G40.409 TONIC CLONIC SEIZURES (H): ICD-10-CM

## 2019-03-08 DIAGNOSIS — G40.409 TONIC CLONIC SEIZURES (H): Primary | ICD-10-CM

## 2019-03-08 LAB — LEVETIRACETAM SERPL-MCNC: <2 UG/ML (ref 6–46)

## 2019-03-10 LAB — LAMOTRIGINE SERPL-MCNC: 1 UG/ML (ref 2.5–15)

## 2019-03-12 ENCOUNTER — DOCUMENTATION ONLY (OUTPATIENT)
Dept: FAMILY MEDICINE | Facility: CLINIC | Age: 29
End: 2019-03-12

## 2019-03-12 NOTE — PROGRESS NOTES
From:  [address]  [address]  [phone]  Fax [phone]      To: [facility]    Attn: Josef Herman MD    Fax Number:[phone]    Date: 3/12/2019    RE: Norman Watts 1990 MRN [id number]      RESULTS FOR YOUR REVIEW:    Orders Only on 03/08/2019   Component Date Value Ref Range Status     Lamotrigine 03/08/2019 1.0* 2.5 - 15.0 ug/mL Final    Comment: INTERPRETIVE INFORMATION:  Lamotrigine     Therapeutic Range:  2.5-15.0 ug/mL              Toxic:  Not well established     Pharmacokinetics varies widely, particularly with co-medications   and/or compromised renal function.  Adverse effects may include   dizziness, somnolence, nausea and vomiting.  Performed by [facility],  [address] [place] [id number] [phone]  [email or web address], Roberto Jeffery MD, Lab. Director       LEVETIRACETAM 03/08/2019 <2.0* 6.0 - 46.0 ug/mL Final       COMMENTS:         Thanks,  Lorenza Sierra    Results faxed by [facility]

## 2019-03-14 ENCOUNTER — TRANSFERRED RECORDS (OUTPATIENT)
Dept: HEALTH INFORMATION MANAGEMENT | Facility: CLINIC | Age: 29
End: 2019-03-14

## 2019-03-31 DIAGNOSIS — F41.9 ANXIETY DISORDER, UNSPECIFIED TYPE: ICD-10-CM

## 2019-10-01 PROBLEM — O35.00X0 MATERNAL CARE FOR (SUSPECTED) CENTRAL NERVOUS SYSTEM MALFORMATION IN FETUS, NOT APPLICABLE OR UNSPECIFIED: Status: ACTIVE | Noted: 2019-02-11

## 2020-03-10 ENCOUNTER — HEALTH MAINTENANCE LETTER (OUTPATIENT)
Age: 30
End: 2020-03-10

## 2020-06-26 ENCOUNTER — OFFICE VISIT (OUTPATIENT)
Dept: FAMILY MEDICINE | Facility: CLINIC | Age: 30
End: 2020-06-26
Payer: COMMERCIAL

## 2020-06-26 VITALS
RESPIRATION RATE: 14 BRPM | HEIGHT: 61 IN | BODY MASS INDEX: 20.39 KG/M2 | SYSTOLIC BLOOD PRESSURE: 95 MMHG | WEIGHT: 108 LBS | TEMPERATURE: 98.3 F | HEART RATE: 93 BPM | DIASTOLIC BLOOD PRESSURE: 61 MMHG

## 2020-06-26 DIAGNOSIS — Z00.00 PREVENTATIVE HEALTH CARE: ICD-10-CM

## 2020-06-26 DIAGNOSIS — R56.9 SEIZURES (H): ICD-10-CM

## 2020-06-26 RX ORDER — FOLIC ACID, .BETA.-CAROTENE, ASCORBIC ACID, CHOLECALCIFEROL, .ALPHA.-TOCOPHEROL ACETATE, DL-, THIAMINE MONONITRATE, RIBOFLAVIN, NIACINAMIDE, PYRIDOXINE HYDROCHLORIDE, CYANOCOBALAMIN, CALCIUM PANTOTHENATE, CALCIUM CARBONATE, FERROUS FUMARATE, AND ZINC OXIDE 1; 1000; 100; 400; 30; 3; 3; 15; 20; 12; 7; 200; 29; 20 MG/1; [IU]/1; MG/1; [IU]/1; [IU]/1; MG/1; MG/1; MG/1; MG/1; UG/1; MG/1; MG/1; MG/1; MG/1
1 TABLET, CHEWABLE ORAL DAILY
Qty: 90 TABLET | Refills: 1 | Status: SHIPPED | OUTPATIENT
Start: 2020-06-26 | End: 2022-01-14

## 2020-06-26 RX ORDER — LANOLIN ALCOHOL/MO/W.PET/CERES
800 CREAM (GRAM) TOPICAL DAILY
Qty: 180 TABLET | Refills: 3 | Status: SHIPPED | OUTPATIENT
Start: 2020-06-26 | End: 2022-04-14

## 2020-06-26 RX ORDER — LEVETIRACETAM 1000 MG/1
1000 TABLET ORAL DAILY
Qty: 3 TABLET | Refills: 1 | Status: SHIPPED | OUTPATIENT
Start: 2020-06-26 | End: 2020-09-02

## 2020-06-26 RX ORDER — LAMOTRIGINE 25 MG/1
50 TABLET ORAL DAILY
Qty: 60 TABLET | Refills: 1 | Status: SHIPPED | OUTPATIENT
Start: 2020-06-26 | End: 2020-09-02

## 2020-06-26 ASSESSMENT — PATIENT HEALTH QUESTIONNAIRE - PHQ9: SUM OF ALL RESPONSES TO PHQ QUESTIONS 1-9: 0

## 2020-06-26 ASSESSMENT — MIFFLIN-ST. JEOR: SCORE: 1148.29

## 2020-06-26 NOTE — NURSING NOTE
Due to patient being non-English speaking/uses sign language, an  was used for this visit. Only for face-to-face interpretation by an external agency, date and length of interpretation can be found on the scanned worksheet.     name: Anastacio Hemphill  Agency: [employer]  Language: Jennifer   Telephone number: [phone]  Type of interpretation: Face-to-face, spoken

## 2020-06-26 NOTE — PROGRESS NOTES
Preceptor Attestation:    Patient seen and evaluated in person. I discussed the patient with the resident. I have verified the content of the note, which accurately reflects my assessment of the patient and the plan of care.   Supervising Physician:  Ruslan Feliz MD.

## 2020-06-26 NOTE — PROGRESS NOTES
"       SUBJECTIVE       Norman Watts is a 30 year old  female with a PMH significant for:     Patient Active Problem List   Diagnosis     Seizure (H)     Chronic viral hepatitis B without delta agent and without coma (H)     Anxiety disorder, unspecified type     Severe single current episode of major depressive disorder, with psychotic features (H)     Beta thalassemia trait     Language barrier, cultural differences     H/O transfusion of packed red blood cells     Supervision of high risk pregnancy, antepartum     Choroid plexus cyst of fetus, single or unspecified fetus     She presents with refills of seizures medications. She hasn't had any break through seizures. She hasn't had any issues with her seizure medications. She doesn't miss doses. She sees a neurologist for her antiseizure medications. She last saw them 9/19/2019. She saw a doctor in [place] while she was there and then she moved back to [place] in March. She is not breastfeeding. She is not feeling otherwise ill.    She is here with her child and .    PMH, Medications and Allergies were reviewed and updated as needed.        REVIEW OF SYSTEMS     See HPI        OBJECTIVE     Vitals:    06/26/20 1323   BP: 95/61   Pulse: 93   Resp: 14   Temp: 98.3  F (36.8  C)   Weight: 49 kg (108 lb)   Height: 1.543 m (5' 0.75\")     Body mass index is 20.57 kg/m .    Constitutional: Awake, alert, cooperative, no apparent distress.  Eyes: Lids and lashes normal, pupils equal.  ENT: Normocephalic, without obvious abnormality, atramatic.  Neck: Supple, symmetrical, trachea midline.  Back: Symmetric, no curvature  Cardiovascular: Regular rate and rhythm, normal S1 and S2, no S3 or S4, and no murmur noted.  Neurologic: Cranial nerves II-XII are grossly intact. Gait is normal.  Neuropsychiatric: Flat affect  Skin: No rashes, erythema, pallor, petechia or purpura.    No results found for this or any previous visit (from the past 24 hour(s)).        ASSESSMENT AND " PLAN     Norman was seen today for recheck.    Diagnoses and all orders for this visit:    Seizures (H): she hasn't had any issues with seizures or break through seizures. Unable to obtain out of state medical records at this time. Discussed continuing to take her medications daily until she is able to see a neurologist. She would like to return to the same neurologist she saw last time. She is not currently breastfeeding. Discussed taking daily vitamin. She was agreeable to the plan.  -     levETIRAcetam (KEPPRA) 1000 MG tablet; Take 1 tablet (1,000 mg) by mouth daily  -     lamoTRIgine (LAMICTAL) 25 MG tablet; Take 2 tablets (50 mg) by mouth daily  -     folic acid (FOLVITE) 400 MCG tablet; Take 2 tablets (800 mcg) by mouth daily  -     NEUROLOGY ADULT REFERRAL; Future    Preventative health care  -     Prenatal Vit-Fe Fumarate-FA (PRENATAL 19) 29-1 MG CHEW; Take 1 tablet by mouth daily    RTC prn or sooner if develops new or worsening symptoms.    Lorenza Sierra MD PGY2  I precepted today with Ruslan Feliz MD.

## 2020-06-30 NOTE — PATIENT INSTRUCTIONS
06/30/20   NEUROLOGY ADULT REFERRAL   [facility]  Phone: [phone]  Fax: [phone]     Referral, demographics, office note and medication list faxed to [phone]. They will contact patient to schedule.     Maria Elena Trent

## 2020-09-02 DIAGNOSIS — R56.9 SEIZURES (H): ICD-10-CM

## 2020-09-02 RX ORDER — LEVETIRACETAM 1000 MG/1
TABLET ORAL
Qty: 30 TABLET | Refills: 0 | Status: SHIPPED | OUTPATIENT
Start: 2020-09-02 | End: 2020-10-05

## 2020-09-02 RX ORDER — LAMOTRIGINE 25 MG/1
TABLET ORAL
Qty: 60 TABLET | Refills: 0 | Status: SHIPPED | OUTPATIENT
Start: 2020-09-02 | End: 2020-10-05

## 2020-10-05 DIAGNOSIS — R56.9 SEIZURES (H): ICD-10-CM

## 2020-10-05 RX ORDER — LAMOTRIGINE 25 MG/1
TABLET ORAL
Qty: 60 TABLET | Refills: 10 | Status: SHIPPED | OUTPATIENT
Start: 2020-10-05 | End: 2021-09-02

## 2020-10-05 RX ORDER — LEVETIRACETAM 1000 MG/1
TABLET ORAL
Qty: 30 TABLET | Refills: 10 | Status: SHIPPED | OUTPATIENT
Start: 2020-10-05 | End: 2021-09-02

## 2020-12-27 ENCOUNTER — HEALTH MAINTENANCE LETTER (OUTPATIENT)
Age: 30
End: 2020-12-27

## 2021-01-10 NOTE — TELEPHONE ENCOUNTER
Department of Emergency Medicine   ED  Provider Note  Admit Date/RoomTime: 1/9/2021  7:44 PM  ED Room: 12/12          History of Present Illness:  1/9/21, Time: 9:58 PM EST  Chief Complaint   Patient presents with    Abdominal Pain     RLQ since yesterday    Nausea    Emesis    Dizziness     for a week                Kim Nicole is a 51 y.o. male presenting to the ED for abdominal pain. Patient states he developed a right lower quadrant dull pain, sharp in nature, nothing makes it better or worse, does not radiate anywhere, came on gradually yesterday. He has not had this before. States he still has gallbladder and appendix. Denies any change in bowel or bladder. Denies any back pain. He also complains of nausea with emesis along with dizziness for the past week. States he feels lightheaded upon standing, and he vomited twice throughout the past week. Denies any hematemesis or hematochezia. Denies any paresthesias or weakness in extremities. Denies head pain, neck pain, blurred vision, chest pain, back pain, weakness in extremities, or any other symptoms or complaints. Review of Systems:   Pertinent positives and negatives are stated within HPI, all other systems reviewed and are negative.        --------------------------------------------- PAST HISTORY ---------------------------------------------  Past Medical History:  has a past medical history of Anxiety, Arthritis, Bipolar affective disorder, currently depressed, moderate (HCC), Chronic back pain, Depression, GERD (gastroesophageal reflux disease), Headache(784.0), Hypotension, Movement disorder, Neuromuscular disorder ([facility] [address] and Other disorders of kidney and ureter in diseases classified elsewhere. Past Surgical History:  has a past surgical history that includes back surgery (2006); Endoscopy, colon, diagnostic (2016); Gastric bypass surgery; Nerve Block (Bilateral, 08/10/2016); Nerve Block (Bilateral, 08/17/2016);  Nerve Block Patient needs to contact Neurology for this refill. Rx given 1 x here in clinic to switch from Depakote due to teratogenic effect. Please contact patient or pharmacy to have neurologist Rx Lamictal. Thanks.    Elvin Brizuela  Family Medicine Resident PGY3     (Left, 10/11/2016); and Nerve Block (Left, 10/18/2016). Social History:  reports that he has never smoked. He has never used smokeless tobacco. He reports that he does not drink alcohol or use drugs. Family History: family history is not on file. . Unless otherwise noted, family history is non contributory    The patients home medications have been reviewed. Allergies: Roxicodone [oxycodone hcl], Diclofenac sodium, Cortisone, and Tramadol        ---------------------------------------------------PHYSICAL EXAM--------------------------------------    Constitutional/General: Alert and oriented x3  Head: Normocephalic and atraumatic  Eyes: PERRL, EOMI, sclera non icteric  Mouth: Oropharynx clear, handling secretions, no trismus, no asymmetry of the posterior oropharynx or uvular edema  Neck: Supple, full ROM, no stridor, no meningeal signs  Respiratory: Lungs clear to auscultation bilaterally, no wheezes, rales, or rhonchi. Not in respiratory distress  Cardiovascular:  Regular rate. Regular rhythm. 2+ distal pulses. Equal extremity pulses. Chest: No chest wall tenderness  GI:  Abdomen Soft, with mild diffuse tenderness, no guarding or rebound, BS normal   Musculoskeletal: Moves all extremities x 4. Warm and well perfused, no clubbing, cyanosis, or edema. Capillary refill <3 seconds  Integument: skin warm and dry. No rashes. Neurologic: GCS 15, no focal deficits, symmetric strength 5/5 in the upper and lower extremities bilaterally. NIH is 0  Psychiatric: Normal Affect          -------------------------------------------------- RESULTS -------------------------------------------------  I have personally reviewed all laboratory and imaging results for this patient. Results are listed below.      LABS: (Lab results interpreted by me)  Results for orders placed or performed during the hospital encounter of 01/09/21   CBC Auto Differential   Result Value Ref Range    WBC 11.6 (H) 4.5 - 11.5 E9/L    RBC 5.36 Negative Negative   COVID-19   Result Value Ref Range    SARS-CoV-2, NAAT Not Detected Not Detected   EKG 12 Lead   Result Value Ref Range    Ventricular Rate 93 BPM    Atrial Rate 93 BPM    P-R Interval 172 ms    QRS Duration 114 ms    Q-T Interval 348 ms    QTc Calculation (Bazett) 432 ms    P Axis 23 degrees    R Axis -47 degrees    T Axis 20 degrees   ,       RADIOLOGY:  Interpreted by Radiologist unless otherwise specified  XR CHEST PORTABLE   Final Result   No acute cardiopulmonary abnormality. CT ABDOMEN PELVIS WO CONTRAST Additional Contrast? None   Final Result   Nonobstructive stone in the left kidney measuring approximately 3 mm. No   evidence of hydronephrosis or hydroureter. Moderate-sized hiatal hernia with fluid along the distal esophagus which may   represent gastroesophageal reflux or esophageal dysmotility. Distended fluid-filled stomach which may reflect gastro paresis or delayed   gastric emptying. Large amount of stool in the colon, consistent with constipation. EKG Interpretation  Interpreted by emergency department physician, Dr. Calvin Gilbert, rate 93, no STEMI      ------------------------- NURSING NOTES AND VITALS REVIEWED ---------------------------   The nursing notes within the ED encounter and vital signs as below have been reviewed by myself  /87   Pulse 102   Temp 97.1 °F (36.2 °C)   Resp 18   Ht 6' 3\" (1.905 m)   Wt 230 lb (104.3 kg)   SpO2 99%   BMI 28.75 kg/m²     Oxygen Saturation Interpretation: Normal    The patients available past medical records and past encounters were reviewed. ------------------------------ ED COURSE/MEDICAL DECISION MAKING----------------------  Medications   0.9 % sodium chloride bolus (1,000 mLs Intravenous New Bag 1/9/21 2050)   ondansetron (ZOFRAN) injection 4 mg (4 mg Intravenous Given 1/9/21 2050)           The cardiac monitor revealed sinus with a heart rate in the 80s as interpreted by me.  The cardiac monitor was ordered secondary to the patient's ab pain and to monitor the patient for dysrhythmia. CPT [id number]         Medical Decision Making:    Patient medicated as above. Labs and imaging reviewed. Reevaluation, patient's resting comfortably. Remains hemodynamically stable, exam unchanged. He does not have acute abdomen, no focal deficits. Given this, along with his findings, did not feel that further emergent evaluation was indicated. Patient was discharged. Patient is to follow-up with the PCP in 1 to 2 days. Patient is to have a repeat abdominal examination on the emergent basis if new or worsening symptoms arise. Counseling: The emergency provider has spoken with the patient and discussed todays results, in addition to providing specific details for the plan of care and counseling regarding the diagnosis and prognosis. Questions are answered at this time and they are agreeable with the plan.       --------------------------------- IMPRESSION AND DISPOSITION ---------------------------------    IMPRESSION  1. Abdominal pain, unspecified abdominal location        DISPOSITION  Disposition: Discharge to home  Patient condition is stable        NOTE: This report was transcribed using voice recognition software.  Every effort was made to ensure accuracy; however, inadvertent computerized transcription errors may be present        Yimi Alvarado MD  01/09/21 [id number]

## 2021-04-24 ENCOUNTER — HEALTH MAINTENANCE LETTER (OUTPATIENT)
Age: 31
End: 2021-04-24

## 2021-09-01 DIAGNOSIS — R56.9 SEIZURES (H): ICD-10-CM

## 2021-09-02 RX ORDER — LEVETIRACETAM 1000 MG/1
TABLET ORAL
Qty: 30 TABLET | Refills: 1 | Status: SHIPPED | OUTPATIENT
Start: 2021-09-02 | End: 2021-09-03

## 2021-09-02 RX ORDER — LAMOTRIGINE 25 MG/1
TABLET ORAL
Qty: 60 TABLET | Refills: 1 | Status: SHIPPED | OUTPATIENT
Start: 2021-09-02 | End: 2021-09-03

## 2021-09-03 ENCOUNTER — OFFICE VISIT (OUTPATIENT)
Dept: FAMILY MEDICINE | Facility: CLINIC | Age: 31
End: 2021-09-03
Payer: COMMERCIAL

## 2021-09-03 VITALS
RESPIRATION RATE: 16 BRPM | DIASTOLIC BLOOD PRESSURE: 65 MMHG | SYSTOLIC BLOOD PRESSURE: 101 MMHG | WEIGHT: 102.6 LBS | HEIGHT: 61 IN | HEART RATE: 89 BPM | TEMPERATURE: 98.4 F | BODY MASS INDEX: 19.37 KG/M2

## 2021-09-03 DIAGNOSIS — R56.9 SEIZURES (H): ICD-10-CM

## 2021-09-03 DIAGNOSIS — F41.9 ANXIETY DISORDER, UNSPECIFIED TYPE: Primary | ICD-10-CM

## 2021-09-03 PROCEDURE — T1013 SIGN LANG/ORAL INTERPRETER: HCPCS | Performed by: FAMILY MEDICINE

## 2021-09-03 PROCEDURE — 99214 OFFICE O/P EST MOD 30 MIN: CPT | Mod: GC | Performed by: FAMILY MEDICINE

## 2021-09-03 RX ORDER — LAMOTRIGINE 25 MG/1
TABLET ORAL
Qty: 60 TABLET | Refills: 1 | Status: SHIPPED | OUTPATIENT
Start: 2021-09-03 | End: 2021-09-14

## 2021-09-03 RX ORDER — LAMOTRIGINE 25 MG/1
TABLET ORAL
Qty: 60 TABLET | Refills: 1 | Status: CANCELLED | OUTPATIENT
Start: 2021-09-03

## 2021-09-03 RX ORDER — HYDROXYZINE HYDROCHLORIDE 25 MG/1
25 TABLET, FILM COATED ORAL 3 TIMES DAILY PRN
Qty: 30 TABLET | Refills: 1 | Status: SHIPPED | OUTPATIENT
Start: 2021-09-03 | End: 2022-12-23

## 2021-09-03 RX ORDER — LEVETIRACETAM 1000 MG/1
TABLET ORAL
Qty: 30 TABLET | Refills: 1 | Status: SHIPPED | OUTPATIENT
Start: 2021-09-03 | End: 2021-09-14

## 2021-09-03 RX ORDER — LEVETIRACETAM 1000 MG/1
TABLET ORAL
Qty: 30 TABLET | Refills: 1 | Status: CANCELLED | OUTPATIENT
Start: 2021-09-03

## 2021-09-03 ASSESSMENT — MIFFLIN-ST. JEOR: SCORE: 1122.77

## 2021-09-03 NOTE — PROGRESS NOTES
Preceptor Attestation:  I discussed the patient with the resident and evaluated the patient in person. I have verified the content of the note, which accurately reflects my assessment of the patient and the plan of care.  Supervising Physician:  Nereida Moralez MD.

## 2021-09-03 NOTE — NURSING NOTE
Due to patient being non-English speaking/uses sign language, an  was used for this visit. Only for face-to-face interpretation by an external agency, date and length of interpretation can be found on the scanned worksheet.     name: Anastacio Hemphill  Agency: [employer]  Language: Jennifer   Telephone number: [phone]  Type of interpretation: Face-to-face, spoken

## 2021-09-03 NOTE — PROGRESS NOTES
[facility] CLINIC    Assessment/Plan:    Seizures (H)  Sent two month refills but discussed that need to follow up with neuro  - lamoTRIgine (LAMICTAL) 25 MG tablet  Dispense: 60 tablet; Refill: 1  - levETIRAcetam (KEPPRA) 1000 MG tablet  Dispense: 30 tablet; Refill: 1  - Adult Neurology Referral    Anxiety disorder, unspecified type  Sounds like frequent panic attacks. RY not significantly elevated otherwise, and having significant stressors with not feeling safe in her neighborhood. Discussed deep breathing techniques. Declined psychotherapy or daily pharmacotherapy for now.   - hydrOXYzine (ATARAX) 25 MG tablet  Dispense: 30 tablet; Refill: 1          Ivania Coreas MD  PGY3, Family Medicine    I staffed with Dr. Moralez, who agrees with my assessment and plan.    Review of prior external note(s) from - Outside records from [facility] RASTA Watts is a 31 year old female with a PMH of   Patient Active Problem List   Diagnosis     Seizure (H)     Chronic viral hepatitis B without delta agent and without coma (H)     Anxiety disorder, unspecified type     Severe single current episode of major depressive disorder, with psychotic features (H)     Beta thalassemia trait     Language barrier, cultural differences     H/O transfusion of packed red blood cells     Supervision of high risk pregnancy, antepartum     Choroid plexus cyst of fetus, single or unspecified fetus     presenting to clinic today with a chief complaint of:    Patient presents with:  Refill Request: Followup on medications, need refill  Anxiety    History of seizure disorder. Last seen by neurology reportedly in September 2019, although I cannot see these records, apparently it was in [place]. A referral was placed to neurology last time.  The last neuro notes I can see are from March 2019. At that time she was supposed to be on both keppra and lamotrigine and supposed to follow up in 4 months.  "Hasn't had any breakthrough seziures. Notices that she gets more sleepy after taking her aniepileptics but no other side effects.     Anxiety- feels nervous and heart rate races. This happens a couple times a week. No life changes. Sleeping okay.  Things that trigger them: seeing people fight outside her house. This happens frequently, unfortunately. She also gets anxiety attacks just randomly as well.     Current Outpatient Medications   Medication Sig Dispense Refill     hydrOXYzine (ATARAX) 25 MG tablet Take 1 tablet (25 mg) by mouth 3 times daily as needed for anxiety 30 tablet 1     lamoTRIgine (LAMICTAL) 25 MG tablet TAKE 2 TABLETS ( 50 MG TOTAL) BY MOUTH DAILY 60 tablet 1     levETIRAcetam (KEPPRA) 1000 MG tablet TAKE ONE TABLET ( 1,000 MG ) BY MOUTH DAILY 30 tablet 1     folic acid (FOLVITE) 400 MCG tablet Take 2 tablets (800 mcg) by mouth daily (Patient not taking: Reported on 9/3/2021) 180 tablet 3     Prenatal Vit-Fe Fumarate-FA (PRENATAL 19) 29-1 MG CHEW Take 1 tablet by mouth daily (Patient not taking: Reported on 9/3/2021) 90 tablet 1       O: /65   Pulse 89   Temp 98.4  F (36.9  C)   Resp 16   Ht 1.549 m (5' 1\")   Wt 46.5 kg (102 lb 9.6 oz)   BMI 19.39 kg/m     Gen:  Well nourished and in no acute distress   HEENT: NCAT  Neuro: grossly infact, no seizure activity during visit  Psych: Euthymic, somewhat quiet and with a shy demeanor.      This note was created using Dragon dictation system. Typos are not purposeful.       "

## 2021-09-03 NOTE — PROGRESS NOTES
Anxiety- feels nervous and heart rate races. This happens a couple times a week. No life changes. Sleeping okay.  Things that trigger them: seeing people fight outside her house.

## 2021-09-07 ASSESSMENT — ANXIETY QUESTIONNAIRES
3. WORRYING TOO MUCH ABOUT DIFFERENT THINGS: NOT AT ALL
2. NOT BEING ABLE TO STOP OR CONTROL WORRYING: SEVERAL DAYS
7. FEELING AFRAID AS IF SOMETHING AWFUL MIGHT HAPPEN: NOT AT ALL
1. FEELING NERVOUS, ANXIOUS, OR ON EDGE: MORE THAN HALF THE DAYS
5. BEING SO RESTLESS THAT IT IS HARD TO SIT STILL: NOT AT ALL
6. BECOMING EASILY ANNOYED OR IRRITABLE: NOT AT ALL
IF YOU CHECKED OFF ANY PROBLEMS ON THIS QUESTIONNAIRE, HOW DIFFICULT HAVE THESE PROBLEMS MADE IT FOR YOU TO DO YOUR WORK, TAKE CARE OF THINGS AT HOME, OR GET ALONG WITH OTHER PEOPLE: SOMEWHAT DIFFICULT
GAD7 TOTAL SCORE: 3

## 2021-09-07 ASSESSMENT — PATIENT HEALTH QUESTIONNAIRE - PHQ9
5. POOR APPETITE OR OVEREATING: NOT AT ALL
SUM OF ALL RESPONSES TO PHQ QUESTIONS 1-9: 7

## 2021-09-08 ASSESSMENT — ANXIETY QUESTIONNAIRES: GAD7 TOTAL SCORE: 3

## 2021-09-14 DIAGNOSIS — R56.9 SEIZURES (H): ICD-10-CM

## 2021-09-14 RX ORDER — LEVETIRACETAM 1000 MG/1
TABLET ORAL
Qty: 30 TABLET | Refills: 3 | Status: SHIPPED | OUTPATIENT
Start: 2021-09-14 | End: 2022-01-14 | Stop reason: ALTCHOICE

## 2021-09-14 RX ORDER — LAMOTRIGINE 25 MG/1
TABLET ORAL
Qty: 30 TABLET | Refills: 3 | Status: SHIPPED | OUTPATIENT
Start: 2021-09-14 | End: 2021-12-14

## 2021-09-14 NOTE — PROGRESS NOTES
Patient was not able to get into neurology until mid December- will send additional referrals of lamictal and keppra to ensure patient does not run out before then.

## 2021-10-09 ENCOUNTER — HEALTH MAINTENANCE LETTER (OUTPATIENT)
Age: 31
End: 2021-10-09

## 2021-10-12 ENCOUNTER — OFFICE VISIT (OUTPATIENT)
Dept: FAMILY MEDICINE | Facility: CLINIC | Age: 31
End: 2021-10-12
Payer: COMMERCIAL

## 2021-10-12 VITALS
SYSTOLIC BLOOD PRESSURE: 98 MMHG | WEIGHT: 99.4 LBS | HEART RATE: 79 BPM | RESPIRATION RATE: 14 BRPM | OXYGEN SATURATION: 99 % | TEMPERATURE: 97.9 F | DIASTOLIC BLOOD PRESSURE: 64 MMHG | BODY MASS INDEX: 18.78 KG/M2

## 2021-10-12 DIAGNOSIS — R56.9 SEIZURES (H): ICD-10-CM

## 2021-10-12 DIAGNOSIS — F41.9 ANXIETY DISORDER, UNSPECIFIED TYPE: Primary | ICD-10-CM

## 2021-10-12 PROCEDURE — 99213 OFFICE O/P EST LOW 20 MIN: CPT | Mod: GC | Performed by: FAMILY MEDICINE

## 2021-10-12 ASSESSMENT — PATIENT HEALTH QUESTIONNAIRE - PHQ9: SUM OF ALL RESPONSES TO PHQ QUESTIONS 1-9: 5

## 2021-10-12 NOTE — PROGRESS NOTES
[facility] CLINIC    Assessment/Plan:    Anxiety disorder, unspecified type  Panic attacks- mood okay between attack.   Started on hydroxyzine for panic attacks last month; patient was going to consider pharmaco/psycho therapy.  She has not yet started hydroxyzine; we discussed this.   Still wants to hold off on preventative treatment with selective serotonin reuptake inhibitor and/or psychotherapy.    Epilepsy  On lamictal bid and keppra daily. Has follow up appointment with neuro in mid-December; has refills to last her until then.      RTC in 6 months for pap smear and to check back in.       Ivania Coreas MD  PGY3, Family Medicine    I staffed with Dr. Martines, who agrees with my assessment and plan.    Prescription drug management       Norman Watts is a 31 year old female with a PMH of   Patient Active Problem List   Diagnosis     Seizure (H)     Chronic viral hepatitis B without delta agent and without coma (H)     Anxiety disorder, unspecified type     Severe single current episode of major depressive disorder, with psychotic features (H)     Beta thalassemia trait     Language barrier, cultural differences     H/O transfusion of packed red blood cells     Supervision of high risk pregnancy, antepartum     Choroid plexus cyst of fetus, single or unspecified fetus     presenting to clinic today with a chief complaint of:    Patient presents with:  Recheck Medication    Panic attacks: 1-2 times/week. Takes deep breaths, but doesn't seem to help. Hasn't started trying the hydroxyzine.     No seizures. Has neuro appt in December    Current Outpatient Medications   Medication Sig Dispense Refill     hydrOXYzine (ATARAX) 25 MG tablet Take 1 tablet (25 mg) by mouth 3 times daily as needed for anxiety 30 tablet 1     lamoTRIgine (LAMICTAL) 25 MG tablet TAKE 2 TABLETS ( 50 MG TOTAL) BY MOUTH DAILY 30 tablet 3     levETIRAcetam (KEPPRA) 1000 MG tablet TAKE ONE TABLET ( 1,000 MG ) BY MOUTH DAILY  30 tablet 3     folic acid (FOLVITE) 400 MCG tablet Take 2 tablets (800 mcg) by mouth daily (Patient not taking: Reported on 9/3/2021) 180 tablet 3     Prenatal Vit-Fe Fumarate-FA (PRENATAL 19) 29-1 MG CHEW Take 1 tablet by mouth daily (Patient not taking: Reported on 9/3/2021) 90 tablet 1       O: BP 98/64   Pulse 79   Temp 97.9  F (36.6  C)   Resp 14   Wt 45.1 kg (99 lb 6.4 oz)   SpO2 99%   BMI 18.78 kg/m     Gen:  Well nourished and in no acute distress.   HEENT: PERRL;  nasopharynx pink and moist; oropharynx pink and moist  Neck: supple without lymphadenopathy  CV:  No increased work of breathing  Psych: Euthymic; quiet and slightly flat affect  Neuro: no tremor, normal gait    This note was created using Dragon dictation system. Typos are not purposeful.

## 2021-10-12 NOTE — PROGRESS NOTES
Preceptor Attestation:    I discussed the patient with the resident and evaluated the patient in person. I have verified the content of the note, which accurately reflects my assessment of the patient and the plan of care.   Supervising Physician:  Shoaib Martines MD.

## 2021-10-12 NOTE — NURSING NOTE
Due to patient being non-English speaking/uses sign language, an  was used for this visit. Only for face-to-face interpretation by an external agency, date and length of interpretation can be found on the scanned worksheet.     name: Angelica Bhatti  Agency: [employer]  Language: Jennifer   Telephone number: [phone]  Type of interpretation: Telephone, spoken

## 2021-11-04 ENCOUNTER — APPOINTMENT (OUTPATIENT)
Dept: CT IMAGING | Facility: HOSPITAL | Age: 31
End: 2021-11-04
Attending: EMERGENCY MEDICINE
Payer: COMMERCIAL

## 2021-11-04 ENCOUNTER — TELEPHONE (OUTPATIENT)
Dept: BEHAVIORAL HEALTH | Facility: CLINIC | Age: 31
End: 2021-11-04

## 2021-11-04 ENCOUNTER — HOSPITAL ENCOUNTER (EMERGENCY)
Facility: HOSPITAL | Age: 31
Discharge: PSYCHIATRIC OR SUBSTANCE USE FACILITY | End: 2021-11-05
Attending: EMERGENCY MEDICINE | Admitting: EMERGENCY MEDICINE
Payer: COMMERCIAL

## 2021-11-04 DIAGNOSIS — F32.A DEPRESSION, UNSPECIFIED DEPRESSION TYPE: ICD-10-CM

## 2021-11-04 DIAGNOSIS — F06.1 CATATONIA: ICD-10-CM

## 2021-11-04 LAB
ACANTHOCYTES BLD QL SMEAR: SLIGHT
ALBUMIN SERPL-MCNC: 4.6 G/DL (ref 3.5–5)
ALBUMIN UR-MCNC: 20 MG/DL
ALP SERPL-CCNC: 57 U/L (ref 45–120)
ALT SERPL W P-5'-P-CCNC: 26 U/L (ref 0–45)
AMMONIA PLAS-SCNC: 23 UMOL/L (ref 11–35)
AMPHETAMINES UR QL SCN: NORMAL
ANION GAP SERPL CALCULATED.3IONS-SCNC: 12 MMOL/L (ref 5–18)
APPEARANCE UR: CLEAR
AST SERPL W P-5'-P-CCNC: 37 U/L (ref 0–40)
BARBITURATES UR QL: NORMAL
BASOPHILS # BLD AUTO: 0.1 10E3/UL (ref 0–0.2)
BASOPHILS NFR BLD AUTO: 0 %
BENZODIAZ UR QL: NORMAL
BILIRUB DIRECT SERPL-MCNC: 0.4 MG/DL
BILIRUB SERPL-MCNC: 1.4 MG/DL (ref 0–1)
BILIRUB UR QL STRIP: NEGATIVE
BUN SERPL-MCNC: 7 MG/DL (ref 8–22)
C REACTIVE PROTEIN LHE: 0.1 MG/DL (ref 0–0.8)
CALCIUM SERPL-MCNC: 9.6 MG/DL (ref 8.5–10.5)
CANNABINOIDS UR QL SCN: NORMAL
CHLORIDE BLD-SCNC: 105 MMOL/L (ref 98–107)
CO2 SERPL-SCNC: 24 MMOL/L (ref 22–31)
COCAINE UR QL: NORMAL
COLOR UR AUTO: YELLOW
CREAT SERPL-MCNC: 0.75 MG/DL (ref 0.6–1.1)
CREAT UR-MCNC: 313 MG/DL
ELLIPTOCYTES BLD QL SMEAR: SLIGHT
EOSINOPHIL # BLD AUTO: 0.2 10E3/UL (ref 0–0.7)
EOSINOPHIL NFR BLD AUTO: 2 %
ERYTHROCYTE [DISTWIDTH] IN BLOOD BY AUTOMATED COUNT: 17 % (ref 10–15)
ETHANOL SERPL-MCNC: <10 MG/DL
GFR SERPL CREATININE-BSD FRML MDRD: >90 ML/MIN/1.73M2
GLUCOSE BLD-MCNC: 95 MG/DL (ref 70–125)
GLUCOSE UR STRIP-MCNC: NEGATIVE MG/DL
HCG SERPL QL: NEGATIVE
HCT VFR BLD AUTO: 33.7 % (ref 35–47)
HGB BLD-MCNC: 10.7 G/DL (ref 11.7–15.7)
HGB UR QL STRIP: NEGATIVE
HOLD SPECIMEN: NORMAL
HOLD SPECIMEN: NORMAL
IMM GRANULOCYTES # BLD: 0 10E3/UL
IMM GRANULOCYTES NFR BLD: 0 %
KETONES UR STRIP-MCNC: 40 MG/DL
LEUKOCYTE ESTERASE UR QL STRIP: NEGATIVE
LYMPHOCYTES # BLD AUTO: 2.4 10E3/UL (ref 0.8–5.3)
LYMPHOCYTES NFR BLD AUTO: 20 %
MAGNESIUM SERPL-MCNC: 2 MG/DL (ref 1.8–2.6)
MCH RBC QN AUTO: 19.3 PG (ref 26.5–33)
MCHC RBC AUTO-ENTMCNC: 31.8 G/DL (ref 31.5–36.5)
MCV RBC AUTO: 61 FL (ref 78–100)
METHADONE UR QL SCN: NORMAL
MONOCYTES # BLD AUTO: 0.8 10E3/UL (ref 0–1.3)
MONOCYTES NFR BLD AUTO: 7 %
MUCOUS THREADS #/AREA URNS LPF: PRESENT /LPF
NEUTROPHILS # BLD AUTO: 8.7 10E3/UL (ref 1.6–8.3)
NEUTROPHILS NFR BLD AUTO: 71 %
NITRATE UR QL: NEGATIVE
NRBC # BLD AUTO: 0 10E3/UL
NRBC BLD AUTO-RTO: 0 /100
OPIATES UR QL SCN: NORMAL
OXYCODONE UR QL: NORMAL
PCP UR QL SCN: NORMAL
PH UR STRIP: 6 [PH] (ref 5–7)
PLAT MORPH BLD: ABNORMAL
PLATELET # BLD AUTO: 195 10E3/UL (ref 150–450)
POLYCHROMASIA BLD QL SMEAR: SLIGHT
POTASSIUM BLD-SCNC: 2.9 MMOL/L (ref 3.5–5)
PROCALCITONIN SERPL-MCNC: <0.02 NG/ML (ref 0–0.49)
PROT SERPL-MCNC: 8.5 G/DL (ref 6–8)
RBC # BLD AUTO: 5.55 10E6/UL (ref 3.8–5.2)
RBC MORPH BLD: ABNORMAL
RBC URINE: 2 /HPF
SODIUM SERPL-SCNC: 141 MMOL/L (ref 136–145)
SP GR UR STRIP: 1.02 (ref 1–1.03)
SQUAMOUS EPITHELIAL: 2 /HPF
TARGETS BLD QL SMEAR: SLIGHT
TSH SERPL DL<=0.005 MIU/L-ACNC: 2.2 UIU/ML (ref 0.3–5)
UROBILINOGEN UR STRIP-MCNC: <2 MG/DL
WBC # BLD AUTO: 12.1 10E3/UL (ref 4–11)
WBC URINE: 3 /HPF

## 2021-11-04 PROCEDURE — 93005 ELECTROCARDIOGRAM TRACING: CPT | Performed by: EMERGENCY MEDICINE

## 2021-11-04 PROCEDURE — 85025 COMPLETE CBC W/AUTO DIFF WBC: CPT | Performed by: EMERGENCY MEDICINE

## 2021-11-04 PROCEDURE — 80177 DRUG SCRN QUAN LEVETIRACETAM: CPT | Performed by: EMERGENCY MEDICINE

## 2021-11-04 PROCEDURE — 82140 ASSAY OF AMMONIA: CPT | Performed by: EMERGENCY MEDICINE

## 2021-11-04 PROCEDURE — 250N000011 HC RX IP 250 OP 636: Performed by: EMERGENCY MEDICINE

## 2021-11-04 PROCEDURE — 82077 ASSAY SPEC XCP UR&BREATH IA: CPT | Performed by: EMERGENCY MEDICINE

## 2021-11-04 PROCEDURE — 80175 DRUG SCREEN QUAN LAMOTRIGINE: CPT | Performed by: EMERGENCY MEDICINE

## 2021-11-04 PROCEDURE — 81001 URINALYSIS AUTO W/SCOPE: CPT | Performed by: EMERGENCY MEDICINE

## 2021-11-04 PROCEDURE — 80076 HEPATIC FUNCTION PANEL: CPT | Performed by: EMERGENCY MEDICINE

## 2021-11-04 PROCEDURE — 90791 PSYCH DIAGNOSTIC EVALUATION: CPT

## 2021-11-04 PROCEDURE — 83735 ASSAY OF MAGNESIUM: CPT | Performed by: EMERGENCY MEDICINE

## 2021-11-04 PROCEDURE — 84703 CHORIONIC GONADOTROPIN ASSAY: CPT | Performed by: EMERGENCY MEDICINE

## 2021-11-04 PROCEDURE — 96374 THER/PROPH/DIAG INJ IV PUSH: CPT

## 2021-11-04 PROCEDURE — 86141 C-REACTIVE PROTEIN HS: CPT | Performed by: EMERGENCY MEDICINE

## 2021-11-04 PROCEDURE — 84145 PROCALCITONIN (PCT): CPT | Performed by: EMERGENCY MEDICINE

## 2021-11-04 PROCEDURE — C9803 HOPD COVID-19 SPEC COLLECT: HCPCS

## 2021-11-04 PROCEDURE — 99285 EMERGENCY DEPT VISIT HI MDM: CPT | Mod: 25

## 2021-11-04 PROCEDURE — 80307 DRUG TEST PRSMV CHEM ANLYZR: CPT | Performed by: EMERGENCY MEDICINE

## 2021-11-04 PROCEDURE — 70450 CT HEAD/BRAIN W/O DYE: CPT

## 2021-11-04 PROCEDURE — 36415 COLL VENOUS BLD VENIPUNCTURE: CPT | Performed by: EMERGENCY MEDICINE

## 2021-11-04 PROCEDURE — 84443 ASSAY THYROID STIM HORMONE: CPT | Performed by: EMERGENCY MEDICINE

## 2021-11-04 RX ORDER — LORAZEPAM 2 MG/ML
0.5 INJECTION INTRAMUSCULAR ONCE
Status: COMPLETED | OUTPATIENT
Start: 2021-11-04 | End: 2021-11-04

## 2021-11-04 RX ADMIN — LORAZEPAM 0.5 MG: 2 INJECTION INTRAMUSCULAR; INTRAVENOUS at 22:02

## 2021-11-04 ASSESSMENT — ENCOUNTER SYMPTOMS
HALLUCINATIONS: 1
FATIGUE: 1
SEIZURES: 1

## 2021-11-04 ASSESSMENT — MIFFLIN-ST. JEOR: SCORE: 1201.7

## 2021-11-04 NOTE — ED TRIAGE NOTES
Pt comes to ED for evaluation of hallucinations and sleepiness. Per family member, patient appeared to be talking and laughing to someone that was not in room.

## 2021-11-04 NOTE — ED PROVIDER NOTES
ED Triage Provider Note  [facility]  Encounter Date: Nov 4, 2021    History:  Chief Complaint   Patient presents with     hallucinations/sleepy     Paw RASTA Watts is a 31 year old female who presents to the ED with hallucinations.     Per , patient had a seizure last night and woke up this morning having hallucinations. She was talking to someone that wasn't there and laughing. No seizure today but she has not been acting normally and has been sleeping most of the day. Patient had a similar episode in 2019 and was brought to the hospital by EMS. No recent illness. Patient was given her prescribed medications yesterday of hydroxyzine, keppra, and Lamictal.    Review of Systems:  Review of Systems   Constitutional: Positive for fatigue.   Neurological: Positive for seizures (x1).   Psychiatric/Behavioral: Positive for hallucinations.     Exam:  /62   Pulse 113   Temp 99.1  F (37.3  C) (Temporal)   Resp 18   SpO2 99%   General: No acute distress. Appears stated age. Does not phonate during exam.  Cardio: Regular rate, extremities well perfused  Resp: Normal work of breathing, grossly normal respiratory rate  Neuro: awake, looking around room, follows commands, does not speak, stands and walks unassisted without difficulty, PERRL @ 3mm with EOMI, negative pronator drift    Medical Decision Making:  Patient arriving to the ED with problem as above. A medical screening exam was performed. Orders initiated from triage (CT head, EKG, blood, urine) to expedite ED workup.    ED Course as of Nov 04 1804   Thu Nov 04, 2021   1755 31yoF with history of seizures (takes Keppra & Lamictal) presenting with her  for evaluation of abnormal behavior.  reports she had a short seizure last night (stopped with medication) and slept overnight. Today has not been herself; talking to herself, inappropriately laughing, will not converse---not back to baseline. No recent illness. Patient  herself has no complaints but will not speak to ; has been able to walk. No seizure seen today.    HR 110s on presentation with otherwise normal vitals. Sitting calmly on exam, makes good eye contact, follows commands, does not speak, no obvious signs of trauma, no focal neuro deficits, normal work of breathing. Doubt stroke. Does require workup given ongoing altered mental status. CT head, EKG, blood, urine ordered from triage to expedite workup.          The patient is appropriate to wait in triage.          Broderick Negrete MD  11/04/21  Emergency Medicine  [facility] EMERGENCY DEPARTMENT  [address] [place] [id number]  [phone]  Dept: [phone]       Broderick Negrete MD  11/04/21 [id number]

## 2021-11-05 ENCOUNTER — HOSPITAL ENCOUNTER (INPATIENT)
Facility: CLINIC | Age: 31
LOS: 1 days | Discharge: HOME | End: 2021-11-05
Attending: PSYCHIATRY & NEUROLOGY | Admitting: PSYCHIATRY & NEUROLOGY
Payer: COMMERCIAL

## 2021-11-05 VITALS
DIASTOLIC BLOOD PRESSURE: 67 MMHG | OXYGEN SATURATION: 100 % | TEMPERATURE: 98.4 F | RESPIRATION RATE: 14 BRPM | SYSTOLIC BLOOD PRESSURE: 98 MMHG | HEART RATE: 100 BPM

## 2021-11-05 VITALS
DIASTOLIC BLOOD PRESSURE: 55 MMHG | BODY MASS INDEX: 22.66 KG/M2 | RESPIRATION RATE: 18 BRPM | TEMPERATURE: 97.7 F | OXYGEN SATURATION: 99 % | HEIGHT: 61 IN | HEART RATE: 93 BPM | SYSTOLIC BLOOD PRESSURE: 100 MMHG | WEIGHT: 120 LBS

## 2021-11-05 PROBLEM — F06.1 CATATONIA: Status: ACTIVE | Noted: 2021-11-05

## 2021-11-05 LAB
ATRIAL RATE - MUSE: 110 BPM
DIASTOLIC BLOOD PRESSURE - MUSE: NORMAL MMHG
INTERPRETATION ECG - MUSE: NORMAL
LEVETIRACETAM (KEPPRA): 9.9 UG/ML (ref 6–46)
P AXIS - MUSE: -18 DEGREES
PR INTERVAL - MUSE: 186 MS
QRS DURATION - MUSE: 78 MS
QT - MUSE: 336 MS
QTC - MUSE: 454 MS
R AXIS - MUSE: -23 DEGREES
SARS-COV-2 RNA RESP QL NAA+PROBE: NEGATIVE
SYSTOLIC BLOOD PRESSURE - MUSE: NORMAL MMHG
T AXIS - MUSE: 13 DEGREES
VENTRICULAR RATE- MUSE: 110 BPM

## 2021-11-05 PROCEDURE — 250N000013 HC RX MED GY IP 250 OP 250 PS 637: Performed by: STUDENT IN AN ORGANIZED HEALTH CARE EDUCATION/TRAINING PROGRAM

## 2021-11-05 PROCEDURE — 124N000002 HC R&B MH UMMC

## 2021-11-05 PROCEDURE — 99239 HOSP IP/OBS DSCHRG MGMT >30: CPT | Mod: GC | Performed by: PSYCHIATRY & NEUROLOGY

## 2021-11-05 PROCEDURE — 99222 1ST HOSP IP/OBS MODERATE 55: CPT | Performed by: PSYCHIATRY & NEUROLOGY

## 2021-11-05 PROCEDURE — 87635 SARS-COV-2 COVID-19 AMP PRB: CPT | Performed by: EMERGENCY MEDICINE

## 2021-11-05 RX ORDER — LAMOTRIGINE 25 MG/1
50 TABLET ORAL DAILY
Status: DISCONTINUED | OUTPATIENT
Start: 2021-11-05 | End: 2021-11-05 | Stop reason: HOSPADM

## 2021-11-05 RX ORDER — METOPROLOL SUCCINATE 50 MG/1
50 TABLET, EXTENDED RELEASE ORAL DAILY
COMMUNITY
End: 2022-12-23

## 2021-11-05 RX ORDER — LANOLIN ALCOHOL/MO/W.PET/CERES
3 CREAM (GRAM) TOPICAL
Status: DISCONTINUED | OUTPATIENT
Start: 2021-11-05 | End: 2021-11-05 | Stop reason: HOSPADM

## 2021-11-05 RX ORDER — ERGOCALCIFEROL 1.25 MG/1
50000 CAPSULE, LIQUID FILLED ORAL WEEKLY
COMMUNITY
End: 2022-04-14

## 2021-11-05 RX ORDER — OLANZAPINE 10 MG/2ML
10 INJECTION, POWDER, FOR SOLUTION INTRAMUSCULAR 3 TIMES DAILY PRN
Status: DISCONTINUED | OUTPATIENT
Start: 2021-11-05 | End: 2021-11-05 | Stop reason: HOSPADM

## 2021-11-05 RX ORDER — ACETAMINOPHEN 325 MG/1
650 TABLET ORAL EVERY 4 HOURS PRN
Status: DISCONTINUED | OUTPATIENT
Start: 2021-11-05 | End: 2021-11-05 | Stop reason: HOSPADM

## 2021-11-05 RX ORDER — OLANZAPINE 10 MG/1
10 TABLET ORAL 3 TIMES DAILY PRN
Status: DISCONTINUED | OUTPATIENT
Start: 2021-11-05 | End: 2021-11-05 | Stop reason: HOSPADM

## 2021-11-05 RX ORDER — POLYETHYLENE GLYCOL 3350 17 G/17G
17 POWDER, FOR SOLUTION ORAL DAILY PRN
Status: DISCONTINUED | OUTPATIENT
Start: 2021-11-05 | End: 2021-11-05 | Stop reason: HOSPADM

## 2021-11-05 RX ORDER — LEVETIRACETAM 500 MG/1
1000 TABLET ORAL DAILY
Status: DISCONTINUED | OUTPATIENT
Start: 2021-11-05 | End: 2021-11-05 | Stop reason: HOSPADM

## 2021-11-05 RX ORDER — HYDROXYZINE HYDROCHLORIDE 25 MG/1
25 TABLET, FILM COATED ORAL EVERY 4 HOURS PRN
Status: DISCONTINUED | OUTPATIENT
Start: 2021-11-05 | End: 2021-11-05 | Stop reason: HOSPADM

## 2021-11-05 RX ORDER — LEVETIRACETAM 1000 MG/1
1000 TABLET ORAL DAILY
COMMUNITY
End: 2022-04-14

## 2021-11-05 RX ORDER — LAMOTRIGINE 25 MG/1
50 TABLET ORAL DAILY
COMMUNITY
End: 2022-01-14

## 2021-11-05 RX ORDER — MAGNESIUM HYDROXIDE/ALUMINUM HYDROXICE/SIMETHICONE 120; 1200; 1200 MG/30ML; MG/30ML; MG/30ML
30 SUSPENSION ORAL EVERY 4 HOURS PRN
Status: DISCONTINUED | OUTPATIENT
Start: 2021-11-05 | End: 2021-11-05 | Stop reason: HOSPADM

## 2021-11-05 RX ADMIN — LAMOTRIGINE 50 MG: 25 TABLET ORAL at 11:23

## 2021-11-05 RX ADMIN — ACETAMINOPHEN 650 MG: 325 TABLET, FILM COATED ORAL at 11:23

## 2021-11-05 RX ADMIN — LEVETIRACETAM 1000 MG: 500 TABLET, FILM COATED ORAL at 11:23

## 2021-11-05 ASSESSMENT — ACTIVITIES OF DAILY LIVING (ADL)
HEARING_DIFFICULTY_OR_DEAF: NO
WALKING_OR_CLIMBING_STAIRS_DIFFICULTY: NO
LAUNDRY: WITH SUPERVISION
PATIENT_/_FAMILY_COMMUNICATION_STYLE: SPOKEN LANGUAGE (NON-ENGLISH)
DOING_ERRANDS_INDEPENDENTLY_DIFFICULTY: NO
ORAL_HYGIENE: INDEPENDENT
ADL_ASSESSMENT: WDL
DRESSING/BATHING_DIFFICULTY: NO
DIFFICULTY_COMMUNICATING: NO
HYGIENE/GROOMING: INDEPENDENT
FALL_HISTORY_WITHIN_LAST_SIX_MONTHS: NO
CONCENTRATING,_REMEMBERING_OR_MAKING_DECISIONS_DIFFICULTY: NO
TOILETING_ISSUES: NO
DRESS: INDEPENDENT
WEAR_GLASSES_OR_BLIND: NO
DIFFICULTY_EATING/SWALLOWING: NO
INTERPRETER_SERVICES_OFFERED_TO_THE_PATIENT: YES

## 2021-11-05 NOTE — ED NOTES
"11/4/2021  Norman Watts 1990   Note: pt is marked for merge of her other chart   MRN: [id number]    [facility] Crisis Assessment:    Started at: 9:55pm  Completed at: 10:55pm  Patient was assessed via virtually ([employer] cart or other teleconferencing device).    This  consulted with the ED provider and nursing staff and then met with the patient.  Pt required a \"Jennifer\"  and one was used to conduct the interview.    Assessment was completed via telemedicine. I initiated the video consultation, introduced myself and reviewed my role. I reviewed the release of information with the patient. Patient was not responding, but the  consents to consultation via video visit.    Chief Complaint and History of Presenting Problem:  Patient is a 31 year old  female who presented to the ED by Family/Friends related to concerns for altered mental status, hallucinations and seizure last evening.  Both  and patient need  in the language of  Jennifer.         The pt is not responding to any questions, has mutism and staring straight a head, pt seems to have catatonia.  reports that she had a 1 minute seizure last evening. This morning she was laughing at nothing and talking to someone who wasn't in the room, seemingly to be having some auditory/visual hallucinations.  reports that she has a hx of depression and anxiety.  She had a similar episode in 2019 and was at [place] for a couple of days.  She also had hallucinations at that time following seizures.      In chart review it shows hx of:   Generalized tonic-clonic seizures with post-ictal hallucinations  Major Depressive Disorder.    She is on Keppra and lamotrigine    Psychotherapy techniques or interventions utilized throughout assessment include: Establishing rapport, Active listening, Assess dimensions of crisis and Brief Supportive Therapy    Biopsychosocial Background  Pt resides with her  and their 2 year old " son.   reports that he is the primary care-taker at their home. She is an immigrant from [facility] per records.    Mental Health History and Current Symptoms   Patient identifies historical diagnoses of  depressed mood and psychotic symptoms in the context of seizures and treatment with keppra.     Mental Health History (prior psychiatric hospitalizations, civil commitments, programmatic care, etc): Hospitalized at [facility] from 12/31/2018 - 1/4/2019     Family Mental and Chemical Health History: none reported     Current and Historic Psychotropic Medications: Keppra and lamotrigine. Patient   Medication Adherent: possibly missed doses   Recent medication changes? No    Current Providers  Primary Care Provider: Yes, Ivania Cruz  Psychiatrist: No  Therapist: No  : No  CTSS or ARMHS: No  ACT Team: No  Other: No    Has an OSCAR been signed? No - unresponsive    Relevant Medical Concerns  Patient identifies concerns with completing ADLs? No - not typically  Patient can ambulate independently? Yes  Other medical health concerns? Yes- seizure disorder   History of concussion or TBI? No     Trauma History   Physical, Emotional, or Sexual abuse: unknown - pt unresponsive   Loss of a friend or family member to suicide: unknown - pt unresponsive   Other identified traumatic event or significant stressor: No -  denies anything traumatic recently    Current Symptoms  Attention, Hyperactivity, and Impulsivity: No   Anxiety:No    Behavioral Difficulties: Yes: Wandering, Withdrawal/Isolation and Other: unresponsive    Mood Symptoms: Yes: Impaired concentration and Impaired decision making    Appetite: No   Feeding and Eating: Yes: Other: ate last at 8am  Interpersonal Functioning: No  Learning Disabilities/Cognitive/Developmental Disorders: No   General Cognitive Impairments: Yes: Decision-Making, Judgment/Insight and Orientation  If yes, see completed Mini-Cog Assessment below.  Sleep: No    Psychosis: Yes: Hallucinations: Auditory and Visual and Grossly Disorganized or Catatonic    Trauma: No     Substance Use History and Treatments   denies she abuses any alcohol or drugs     Patient has recently completed a drug screen or BAL/Breathalyzer? No    History of Suicidal Ideation, Suicide Attempts, Non-Suicidal Self Injury, and Risk Formulation:   History of suicidal ideation: None reported   Details of Current Ideation and Attempts: None reported  Patient does not have a history of suicidal ideation. - Her  denies, but patient was not answering questions.   Patient is not able to identify triggers to suicidal ideation.   does not acknowledge a history of suicide attempts.   Pt unable to answer   does not acknowledge a history of self-injurious behavior.   Pt unable to answerr.     Pt unable to be assessed fully for suicidal ideation/tendencies due to catatonia.     ESS-6  1.a. Over the past 2 weeks, have you had thoughts of killing yourself? Unresponsive   1.b. Have you ever attempted to kill yourself and, if yes, when did this last happen? Unresponsive  2. Recent or current suicide plan? Unresponsive  3. Recent or current intent to act on ideation? Unresponsive  4. Lifetime psychiatric hospitalization? Unresponsive  5. Pattern of excessive substance use? Unresponsive  6. Current irritability, agitation, or aggression? Unresponsive  ESS-6 Score: No score.    Current risk factors for suicide include hx of major depression w/ psychosis . Protective factors against suicide include strong bond to family/friends and responsibilities to others (spouse, pets, children, etc.).    Other Risk Areas  Aggressive/assumptive/homicidal risk factors: No   Duty to warn?No   Was a Child Protection Report Made? No   Was a Adult Protection Report Made? No      Sexually inappropriate behavior? No      Vulnerability to sexual exploitation? No     Mental Status Exam:  Affect: Blunted and  Flat  Appearance: Disheveled   Attention Span/Concentration: Other: no response    Eye Contact: Avoidant - pt staring straight ahead the whole time  Fund of Knowledge: Other: unable to assess    Language /Speech Content: Non-fluent - did not respond - unable to assess.   Language /Speech Volume: Mute   Language /Speech Rate/Productions: did not respond - unable to assess.     Recent Memory: Other: did not respond - unable to assess.   Remote Memory: Other: did not respond - unable to assess.   Mood: Other: did not respond - unable to assess. catatonic, depressed   Orientation:   Person: Answer: did not respond - unable to assess.    Place: Answer: did not respond - unable to assess.   Time of Day: Answer: did not respond - unable to assess.    Date: Answer: did not respond - unable to assess.    Situation (Do they understand why they are here?): Answer: did not respond - unable to assess.    Psychomotor Behavior: Underactive   Thought Content: Hallucinations  Thought Form: Other: did not respond - unable to assess.     Clinical Summary and Disposition  Clinical summary (include strengths, protective factors, community resources, and assessment of vulnerability/risk): Difficult to assess patient due to her being unable to respond to questions.  There was also a significant language barrier for both her and .  Clinically, as stated above there is a hx of major depression and tonic-clonic seizures and hallucinations.  She also has a hx of depression.  If she is medically cleared she will need to be treated Psychiatrically for her catatonia and depression etc.     Assessment and intervention included interviewing patient directly, obtaining collateral from pt's  Jez who was with her and reviewing chart/EPIC notes.      Psychotherapy techniques were hard to utilize as the patient was not engaging in the interview. Attempted to complete assessment of risk etc.  Active listening and observing, showed respect  and established rapport, encouraged expression of feelings and emotions, assessed strengths, and provided safety and suicide assessment.    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 Patient's  agrees at this time.      Details of final disposition include: Inpatient mental health .      If Inpatient, is patient admitted voluntary? Yes  -so far voluntarily, but pt is holdable   Patient aware of potential for transfer if there is not appropriate placement? NA - unknown, unresponsive   [facility] NA - unknown, unresponsive   Central Intake Notified? Yes: Date: 11/5/2021 Time: 11pm    Safety and After Care Planning:        Safety Plan Provided?  No    Follow-Up Plans and Providers: None at this time, Inpatient admission    Duration of face to face time with patient in minutes: 1.0 hrs    CPT code(s) utilized: 24790    TRAVIS Fallon

## 2021-11-05 NOTE — ED NOTES
Norman Watts  November 4, 2021  SAFE Note    Note: pt is marked for merge of her other chart   MRN: [id number]    Critical Safety Issues: Pt is a 32 yo female who presents this evening with her  for concerns of altered mental status.  Both  and patient need  in the language of  Jennifer.    Writer used  for interview this evening.      The pt is not responding to any questions, has mutism and staring straight a head, pt seems to have catatonia.  reports that she had a 1 minute seizure last evening. This morning she was laughing at nothing and talking to someone who wasn't in the room, seemingly to be having some auditory/visual hallucinations.  reports that she has a hx of depression and anxiety.  She had a similar episode in 2019 and was at [place] for a couple of days.  She also had hallucinations at that time following seizures.      In chart review it shows hx of:   Generalized tonic-clonic seizures with post-ictal hallucinations  Major Depressive Disorder.    She is on Keppra and lamotrigine      Current Suicidal Ideation/Self-Injurious Concerns/Methods: None - N/A      Current or Historical Inappropriate Sexual Behavior: No      Current or Historical Aggression/Homicidal Ideation: None - N/A      Triggers: unknown     Updated care team: Yes: Writer consulted with Ike Mchugh DO about inpatient disposition.     Writer also called and provided background information to central intake at 11pm    For additional details see full [facility] assessment.     EN Bustillos, [facility]

## 2021-11-05 NOTE — ED NOTES
Pt presents Pt non-verbal, restless, continues to attempt to walk away/exit bed. Pt's significant other stated pt hasn't been taking her seizure medications, pt's significant other gave her medication yesterday and pt had seizure at home and hasn't been responsive verbally since the seizure that last 1-3 minutes. Assessment completed with use of telephone Jennifer . Unable to complete neuro assessment as pt is non-verbal and not following commands appropriately. Pt restless, stares off, not looking directly at staff, pulls away from staff when attempting to get vitals. Pt moving all extremities independently.   SKIN: WNL.   HEENT: Normocephalic, pupils 7 mm reactive to light round, brisk. alert unable to assess orientation as pt will not speak.   CHEST: Symmetrical rise, breath sounds are equal and clear bilaterally. Unable to assess for pain/discomfort/SOB  ABD: NTND. No reported N&V or diarrhea.  EXT: GAY x 4  Rest of exam unremarkable.

## 2021-11-05 NOTE — H&P
"  Psychiatry History and Physical    Norman Watts MRN# [id number]   Age: 31 year old YOB: 1990     Date of Admission:  11/5/2021  Admitting Physician:  Pro Mcgee MD          Contacts:     Primary Outpatient Psychiatrist: Not currently following with one  Primary Physician: Ivania Cruz         Chief Complaint:     \"Hallucinations\"         History of Present Illness:     History obtained from patient and electronic chart    Norman Watts is a 31 year old Gertrudis female with a past psychiatric history of depression and medical hx notable for tonic clonic seizures currently prescribed Keppra and lamotrigine admitted from the ER on 11/06/2021 due to concern for hallucinations following a 1 min seizure last night in the context of medication non-adherence.    Per ED Note:   \"She has a history of seizures and is on Keppra and lamotrigine. Patient missed one day of these medications because they were late to pick them up at the pharmacy. Per patient's , last night the patient had a 1 minute seizure and was biting her lips. She was not incontinent and did not fall or hit her head during the episode. Following the seizure, patient slept for hours. When she woke up she was talking to herself and laughing.  states that she was seeing and hearing things.\"      \"Patient has a history of a similar episode in 2019 where she was admitted to [facility]. At present  states that she has a history of depression, but has not had any complaints of this recently. He states that the patient has been confused today and is not aware of what happened. She has been walking around, eating, drinking, and able to go to the bathroom by herself today.\"     \"In the ED, the pt was not responding to any questions, has mutism and staring straight a head, pt seems to have catatonia.\"     She was medically cleared for admission to inpatient psychiatric unit.    Per patient report:    Norman Watts was " "interviewed in her room with the assistance of a Gertrudis interpretor. She reports that she has no memory of yesterday's events, and is unsure if she had a seizure or not. She reports a history of seizures after which she becomes confused and \"sleeps a lot\". She is unsure of when she last took her antiepileptic medications. She states that she sometimes feels sad, but denies any persistent symptoms that last longer than a few hours. She denies having any auditory or visual hallucinations. When asked if she'd be okay discharging today, she states that she is concerned that her arms and legs \"feel heavy\". She denies any suicidal thoughts or homicidal thoughts. No noted psychiatric symptoms of any kind endorse during assessment    ED/Hospital Course    Patient had a largely unremarkable ED course. Her was HR was in the 110s on presentation with otherwise normal vitals. IV Ativan given in ED for suspected catatonia.     The risks, benefits, alternatives and side effects have been discussed and are understood by the patient and other caregivers.         Psychiatric Review of Systems:     Negative unless noted in HPI         Medical Review of Systems:     The Review of Systems is negative other than what is noted in the HPI         Psychiatric History:     Prior diagnoses: previous psychiatric diagnoses include Depression     Hospitalizations: Admitted to [facility] in 2019 as above, and 2017    Court Commitments: None per patient report and chart review     Suicide attempts: None per patient report and chart review     Self-injurious behavior: None per patient report and chart review     Violence: None per patient report     ECT: None per chart review     TMS: None per patient report    Psychiatric Medications:  None - has taken medications for depression in the past, but not recently. Family medicine provider has recommended hydroxyzine for anxiety.    Other medications:  Lamotrigine  Keppra         Substance Use History: "      Denies history of chemical dependency treatment          Social History:     Family/Relationships:            Past Medical History:     Reports history of: seizures    No other pertinent medical history         Allergies:      No Known Allergies         Medications:     Current Outpatient Medications   Medication Sig Dispense Refill     lamoTRIgine (LAMICTAL) 25 MG tablet Take 50 mg by mouth daily       levETIRAcetam (KEPPRA) 1000 MG tablet Take 1,000 mg by mouth daily       metoprolol succinate ER (TOPROL-XL) 50 MG 24 hr tablet Take 50 mg by mouth daily       vitamin D2 (ERGOCALCIFEROL) 36951 units (1250 mcg) capsule Take 50,000 Units by mouth once a week               Family History:     No family history on file.         Labs:     Admission on 11/04/2021, Discharged on 11/05/2021   Component Date Value Ref Range Status     Ventricular Rate 11/04/2021 110  BPM Final     Atrial Rate 11/04/2021 110  BPM Final     AR Interval 11/04/2021 186  ms Final     QRS Duration 11/04/2021 78  ms Final     QT 11/04/2021 336  ms Final     QTc 11/04/2021 454  ms Final     P Axis 11/04/2021 -18  degrees Final     R AXIS 11/04/2021 -23  degrees Final     T Axis 11/04/2021 13  degrees Final     Interpretation ECG 11/04/2021    Final                    Value:Sinus tachycardia  Inferior infarct , age undetermined  Abnormal ECG  No previous ECGs available  Confirmed by SEE ED PROVIDER NOTE FOR, ECG INTERPRETATION (4000),  MARIBEL LARSON ([id number]) on 11/5/2021 10:31:20 AM       Alcohol, Blood 11/04/2021 <10  None detected mg/dL Final    None Detected     Sodium 11/04/2021 141  136 - 145 mmol/L Final     Potassium 11/04/2021 2.9* 3.5 - 5.0 mmol/L Final     Chloride 11/04/2021 105  98 - 107 mmol/L Final     Carbon Dioxide (CO2) 11/04/2021 24  22 - 31 mmol/L Final     Anion Gap 11/04/2021 12  5 - 18 mmol/L Final     Urea Nitrogen 11/04/2021 7* 8 - 22 mg/dL Final     Creatinine 11/04/2021 0.75  0.60 - 1.10 mg/dL Final      Calcium 11/04/2021 9.6  8.5 - 10.5 mg/dL Final     Glucose 11/04/2021 95  70 - 125 mg/dL Final     GFR Estimate 11/04/2021 >90  >60 mL/min/1.73m2 Final    As of July 11, 2021, eGFR is calculated by the CKD-EPI creatinine equation, without race adjustment. eGFR can be influenced by muscle mass, exercise, and diet. The reported eGFR is an estimation only and is only applicable if the renal function is stable.     Bilirubin Total 11/04/2021 1.4* 0.0 - 1.0 mg/dL Final     Bilirubin Direct 11/04/2021 0.4  <=0.5 mg/dL Final     Protein Total 11/04/2021 8.5* 6.0 - 8.0 g/dL Final     Albumin 11/04/2021 4.6  3.5 - 5.0 g/dL Final     Alkaline Phosphatase 11/04/2021 57  45 - 120 U/L Final     AST 11/04/2021 37  0 - 40 U/L Final     ALT 11/04/2021 26  0 - 45 U/L Final     Magnesium 11/04/2021 2.0  1.8 - 2.6 mg/dL Final     Ammonia 11/04/2021 23  11 - 35 umol/L Final     hCG Serum Qualitative 11/04/2021 Negative  Negative Final     CRP 11/04/2021 0.1  0.0-<0.8 mg/dL Final     Procalcitonin 11/04/2021 <0.02  0.00 - 0.49 ng/mL Final    Comment: Interpretation and Recommendations  <0.05 ng/mL Normal  Very low risk of bacterial infection.  Strongly discourage antibiotics.    0.05-0.24 ng/mL Low risk of systemic infection. Local bacterial infection possible.  Assess other clinical features of infection.  Discourage antibiotics.    0.25-0.49 ng/mL Possible early systemic infection or localized infection  Encourage antibiotics only in correct clinical context.  Consider obtaining blood cultures or other relevant cultures.  Recheck PCT in 6-12 hours to ensure baseline low level.  If repeat PCT is rising, consider early systemic infection and consider starting antibiotics.    0.50-1.99 ng/mL Moderate risk of systemic infection.  Recommend antibiotics.  Evaluate culture results and clinical features to target antibacterial therapy.  Obtain blood cultures and other relevant cultures if not done.    If empiric antibiotics were started,  recheck PCT in:       2 days to guide antibiotic de-escalation.       Discontinue or de-escalate                            antibiotics when PCT concentration is <80% of      peak or abs PCT <0.5.    If empiric antibiotics were NOT started, recheck PCT in:       6-24 hours to re-evaluate need for antibiotics.     2.00-9.99 g/mL High risk for progression to severe sepsis.  Strongly recommend initiating or continuing antibiotics.  Evaluate culture results and clinical features to target antibacterial therapy.  Obtain blood cultures and other relevant cultures if not done.  Repeat PCT in 2 days to guide antibiotic de-escalation.  Consider de-escalating antibiotics when PCT concentration is <80% or peak or abs PCT < 0.05.    Greater than or equal to 10 ng/mL Very high likelihood of severe sepsis or septic shock.  Strongly recommend initiating or continuing antibiotics.  Evaluate culture results and clinical features to target antibacterial therapy.  Obtain blood cultures and other relevant cultures if not done.  Repeat PCT in 2 days to guide antibiotic de-escalation.  Consider de-escalating antibiotics when PCT                            concentration is <80% of peak or abs PCT < 1.    Normal Procalcitonin in healthy populations: <=0.07  ng/ml     Procalcitonin Interpretation Guidelines:   Diagnosis of systemic bacterial infection/sepsis/septic shock:       <0.5 ng/mL:           Low risk of sepsis; localized bacterial infection possible       >=0.5 to <=2.0 ng/mL: Sepsis possible. Interpret in context of the specific clinical background and condition of the patient. Retest PCL within 6-24 hours.       >2.0 ng.mL:           High risk of sepsis and/or septic shock.         Antibiotic therapy may be discontinued if the current PCL is <=0.5 ng/mL or the Change in PCL (using highest PCL this admission and current PCL) is >80%. Do PCL testing on patients being treated with antibiotics for sepsis every 1-2 days.     Diagnosis  of lower respiratory tract infection(LRTI) and decision making on antibiotic therapy:      <0.1 ng/ml:            Bacterial infection very unlikely. Antibiotic therapy strongly discouraged.      0.1 to                            0.25 ng/mL :    Bacterial infection unlikely. Antibiotic therapy discouraged.      0.26 to 0.5 ng/ml:     Bacterial infection likely. Antibiotic therapy encouraged.      >0.5 ng/mL:            Bacterial infection very likely. Antibiotic therapy strongly encouraged.                   If antibiotics are not started for suspected LRTI, repeat PCL within 6-24 hours if symptoms persist or worsen.       Antibiotic therapy for LRTI may be discontinued if the PCL is <=0.25 ng/ml or the Change in PCL is >80%       Do PCL testing on patients being treated with antibiotics for LRTI every 1-2 days.     Decisions regarding antibiotic therapy should not be based solely on PCL concentrations. PCL results should be used in conjunction with the laboratory findings and clinical signs and be interpreted in the context of the patient's clinical situation.     Color Urine 11/04/2021 Yellow  Colorless, Straw, Light Yellow, Yellow Final     Appearance Urine 11/04/2021 Clear  Clear Final     Glucose Urine 11/04/2021 Negative  Negative mg/dL Final     Bilirubin Urine 11/04/2021 Negative  Negative Final     Ketones Urine 11/04/2021 40 * Negative mg/dL Final     Specific Gravity Urine 11/04/2021 1.025  1.001 - 1.030 Final     Blood Urine 11/04/2021 Negative  Negative Final     pH Urine 11/04/2021 6.0  5.0 - 7.0 Final     Protein Albumin Urine 11/04/2021 20 * Negative mg/dL Final     Urobilinogen Urine 11/04/2021 <2.0  <2.0 mg/dL Final     Nitrite Urine 11/04/2021 Negative  Negative Final     Leukocyte Esterase Urine 11/04/2021 Negative  Negative Final     Mucus Urine 11/04/2021 Present* None Seen /LPF Final     RBC Urine 11/04/2021 2  <=2 /HPF Final     WBC Urine 11/04/2021 3  <=5 /HPF Final     Squamous Epithelials  Urine 11/04/2021 2* <=1 /HPF Final     Levetiracetam 11/04/2021 9.9  6.0 - 46.0 ug/mL Final    Suggested Trough Concentrations: 6.0 - 46.0 ug/mL     TSH 11/04/2021 2.20  0.30 - 5.00 uIU/mL Final     WBC Count 11/04/2021 12.1* 4.0 - 11.0 10e3/uL Final     RBC Count 11/04/2021 5.55* 3.80 - 5.20 10e6/uL Final     Hemoglobin 11/04/2021 10.7* 11.7 - 15.7 g/dL Final     Hematocrit 11/04/2021 33.7* 35.0 - 47.0 % Final     MCV 11/04/2021 61* 78 - 100 fL Final     MCH 11/04/2021 19.3* 26.5 - 33.0 pg Final     MCHC 11/04/2021 31.8  31.5 - 36.5 g/dL Final     RDW 11/04/2021 17.0* 10.0 - 15.0 % Final     Platelet Count 11/04/2021 195  150 - 450 10e3/uL Final     % Neutrophils 11/04/2021 71  % Final     % Lymphocytes 11/04/2021 20  % Final     % Monocytes 11/04/2021 7  % Final     % Eosinophils 11/04/2021 2  % Final     % Basophils 11/04/2021 0  % Final     % Immature Granulocytes 11/04/2021 0  % Final     NRBCs per 100 WBC 11/04/2021 0  <1 /100 Final     Absolute Neutrophils 11/04/2021 8.7* 1.6 - 8.3 10e3/uL Final     Absolute Lymphocytes 11/04/2021 2.4  0.8 - 5.3 10e3/uL Final     Absolute Monocytes 11/04/2021 0.8  0.0 - 1.3 10e3/uL Final     Absolute Eosinophils 11/04/2021 0.2  0.0 - 0.7 10e3/uL Final     Absolute Basophils 11/04/2021 0.1  0.0 - 0.2 10e3/uL Final     Absolute Immature Granulocytes 11/04/2021 0.0  <=0.0 10e3/uL Final     Absolute NRBCs 11/04/2021 0.0  10e3/uL Final     Hold Specimen 11/04/2021 JIC   Final     Hold Specimen 11/04/2021 JI   Final     Platelet Assessment 11/04/2021 Automated Count Confirmed. Platelet morphology is normal.  Automated Count Confirmed. Platelet morphology is normal. Final     Acanthocytes 11/04/2021 Slight* None Seen Final     Elliptocytes 11/04/2021 Slight* None Seen Final     Polychromasia 11/04/2021 Slight* None Seen Final     Target Cells 11/04/2021 Slight* None Seen Final     RBC Morphology 11/04/2021 Confirmed RBC Indices   Final     Amphetamines Urine 11/04/2021 Screen  "Negative  Screen Negative Final     Benzodiazepines Urine 2021 Screen Negative  Screen Negative Final     Opiates Urine 2021 Screen Negative  Screen Negative Final     PCP Urine 2021 Screen Negative  Screen Negative Final     Cannabinoids Urine 2021 Screen Negative  Screen Negative Final     Barbiturates Urine 2021 Screen Negative  Screen Negative Final     Cocaine Urine 2021 Screen Negative  Screen Negative Final     Methadone Urine 2021 Screen Negative  Screen Negative Final     Oxycodone Urine 2021 Screen Negative  Screen Negative Final     Creatinine Urine mg/dL 2021 313  mg/dL Final     SARS CoV2 PCR 2021 Negative  Negative Final    NEGATIVE: SARS-CoV-2 (COVID-19) RNA not detected, presumed negative.              Psychiatric Examination:     24 Hour Vital Signs Summary:  SpO2: 100 % (SpO2  Min: 100 %  Max: 100 %)  Pulse: 100 (Pulse  Min: 100  Max: 100)  BP: 98/67  Systolic (24hrs), Av , Min:98 , Max:98   Diastolic (24hrs), Av, Min:67, Max:67    Appearance:  awake, alert, adequately groomed, dressed in hospital scrubs and appeared as age stated  Attitude:  cooperative  Eye Contact:  good  Mood: \"okay\"  Affect:  mood congruent and generally neutral  Speech:  clear, coherent (per interpretors report)  Psychomotor Behavior:  no evidence of tardive dyskinesia, dystonia, or tics; no evidence of catatonia  Thought Process:  logical and linear  Associations:  no loose associations  Thought Content:  no evidence of suicidal ideation or homicidal ideation and no evidence of psychotic thought  Insight:  fair  Judgment:  intact  Oriented to:  time, person, and place  Attention Span and Concentration:  intact  Recent and Remote Memory:  limited  Language:  Jennifer, with appropriate syntax and vocabulary per interpretor  Fund of Knowledge: Not formally assessed  Muscle Strength and Tone: Not assessed  Gait and Station: Normal         Physical Exam:     See ED " "assessment note by ED physician on 11/4        Assessment   Paw RASTA Watts is a 31 year old [place]rudis female with a past psychiatric history of depression and medical hx notable for tonic clonic seizures currently prescribed Keppra and lamotrigine admitted from the ER on 11/05/2021 due to concern for hallucinations following a 1-3 min seizure last night in the context of medication non-adherence. In the ED, the pt was not responding to any questions, had mutism and staring straight ahead which prompted the thought of catatonia which lead to transfer and admission to [facility]. Her lab workup and imaging were unremarkable, though head CT was limited by motion. She has a prior hx of similar hospitalizations and seizure episodes that are followed by visual hallucinations. She has a hx of seizure disorder and has recently not been taking her medications, likely resulting in a recurrent seizure last evening.    We have considered that the patient may be having recurrent seizures which would lead to the symptoms observed in the ED (mutism, staring, etc.) as opposed to catatonia. This would indicate that her auditory/visual hallucinations may be recurrent post-ictal episodes. At this point, her symptoms can most likely be explained by her history of seizure disorder in the context of recent medication non-compliance. She reports \"heaviness\" in her arms and legs which is a common symptom following seizure. Her hallucinatory symptoms as stated by her  are likely secondary to a post-ictal period which, apparently, she has a history of very similar episodes. She does not meet criteria for a mood disorder and certainly does not meet crtieria for a psychotic disorder at this time and therefore would not benefit from inpatient psychiatric hospitalization. She states that she does not have recollection of yesterdays events but her MSE is unremarkable. We have consulted neurology who has agreed to evaluate the patient this afternoon. " "The patient appears to be appropriate for discharge today pending neurology evaluation. If she is deemed to require additional hospitalization, she will likely be transferred to a medical floor.     Disposition pending clinical stabilization, medication optimization and development of an appropriate discharge plan in conjunction with Neurology.    Risk for harm is low.  Risk factors: None identified  Protective factors: family and hospitalization     Principal psychiatric diagnosis:     Episodes of sadness, not currently meeting criteria for MDD        Plan       Admit to [address] with Attending Physician Dr. Everardo M.D.    Neurology consult, appreciate recs    Restart home anti-epileptics as below    Medications:   Outpatient medications held:       None    Outpatient medications continued:     Lamotrigine 50 mg PO QID     Keppra, 1000 mg PO QID    New medications initiated:     None    Hospital PRNs as ordered:      Olanzapine 10 mg PO/IM prn Q2H severe agitation/psychosis    Hydroxyzine 25-50 mg Q6H PRN for anxiety    Tylenol 650 mg Q6H PRN for pain and fever    Maalox 30 ml Q4H PRN for indigestion    Melatonin, 3mg PRN at bedtime for sleep    Miralax 17mg PRN for constipaion    Medications: risks/benefits discussed with patient    Patient will be treated in therapeutic milieu with appropriate individual and group therapies.    Laboratory/Imaging:  -as above    Legal Status:   Voluntary admission    Safety Assessment:        Pt has not required locked seclusion or restraints in the past 24 hours to maintain safety, please refer to RN documentation for further details.    Consults:    Neurology for evaluation of \"arm and leg heaviness\" and medication management in patient with seizure disorder    Medical diagnoses to be addressed this admission:     Seizure disorder  o Currently on Lamotrigine and Keppra, has not taken recently     Dispo: Pending medication management, neurology evaluation and clinical " stabilization      Patient was seen and discussed with the Attending Physician, MD Victor Hugo Best, MS4    Attestation:   I was present with the medical student who participated in the service and in the documentation of the note. I have verified the history and personally performed the physical exam and medical decision making. I agree with the assessment and plan of care as documented in the note.    Daniel Aguilar, PGY-1 (Psychiatry)  [facility]    Psychiatry Attending Attestation:  I, Pro Mcgee , have personally performed an examination of this patient and I have reviewed the resident's documentation.  I have edited the note to reflect all relevant changes.  I have discussed this patient with the house staff on 11/5/2021.  I agree with resident findings and plan in today's note and yesterdays resident H&P.  I have reviewed all vitals and laboratory findings.      Pro Hurt MD on 11/7/2021 at 10:55 PM

## 2021-11-05 NOTE — PLAN OF CARE
BEHAVIORAL TEAM DISCUSSION    Participants:   Pro Mcgee MD Attending Psychiatrist  Daniel Aguilar MD, PGY1  Honey Driscoll, MSW, [facility] Clinical Treatment Coordinator  TINA Cox MS, OTR/L   Victor Hugo Shepard, MS4  Khai Parsons, MS3   Progress: adequate for discharge  Anticipated length of stay: discharge today  Continued Stay Criteria/Rationale: none - admitted due to concerns for catatonia/psychosis - these symptoms were related to post-ictal presentation  Medical/Physical: seizure disorder - neurology consult completed  Precautions:   Behavioral Orders   Procedures    Code 1 - Restrict to Unit    Routine Programming     As clinically indicated    Seizure precautions    Status 15     Every 15 minutes.     Plan: presentation secondary to medical condition - no need for ongoing psychiatry admission - psychiatry has consulted with neurology - per medicine/neurology no need for medical admission - will discharge to home to follow up with neurology out patient as previously scheduled.   Rationale for change in precautions or plan: per ongoing assessment.

## 2021-11-05 NOTE — PROGRESS NOTES
11/05/21 1026   Patient Belongings   Did you bring any home meds/supplements to the hospital?  Yes   Disposition of meds  Sent to security/pharmacy per site process   Patient Belongings locker   Patient Belongings Put in Hospital Secure Location (Security or Locker, etc.) cash/credit card;cell phone/electronics;clothing;jewelry;money (see comment);necklace;plastic bag;purse/wallet;shoes   Belongings Search Yes   Clothing Search Yes   Second Staff Lsisa MAGDALENO   Items in Locker: shirt, Bra, pajama pants, underwear, hat,socks, tennis shoes, state ID, certification of citizenship.     Items sent to Security: Cash $33, Visa Card #[id number], social security #[id number]      A               Admission:  I am responsible for any personal items that are not sent to the safe or pharmacy.  [facility] is not responsible for loss, theft or damage of any property in my possession.    Signature:  _________________________________ Date: _______  Time: _____                                              Staff Signature:  ____________________________ Date: ________  Time: _____      2nd Staff person, if patient is unable/unwilling to sign:    Signature: ________________________________ Date: ________  Time: _____     Discharge:  [facility] has returned all of my personal belongings:    Signature: _________________________________ Date: ________  Time: _____                                          Staff Signature:  ____________________________ Date: ________  Time: _____

## 2021-11-05 NOTE — PLAN OF CARE
"  Problem: Sensory Perception Impairment (Psychotic Signs/Symptoms)  Goal: Decreased Sensory Symptoms (Psychotic Signs/Symptoms)  Outcome: Improving     Pt arrived on the unit at 0945. Was seen at [facility] ED catatonic symptoms and hallucinations post-seizure. Upon arriving to the unit, pt is responsive, answering questions via  and following verbal commands. Writer asked pt if she understood why she was here. Pt responded, \"I don't know\". Writer explained the events that lead to ED admission and transfer. Pt stated she doesn't remember being at home and having a seizure, but remembers when she got to the hospital.    Pt presents with a flat, blunted affect. She is quiet and withdrawn on approach. Vitals taken, belongings searched and pt oriented to the unit via . Pt denies any anxiety, SI or depression. Denies auditory or visual hallucinations. When asked, pt stated to , \"the only voice I hear is my nurse\". Pt lives with her  and 2 year old daughter- reports she feels safe where she is living. Denies abuse hx. When asked if anybody should be notified of her hospitalization, pt stated her  and her 's sister. Writer asked if pt could notify them- pt stated no because she didn't have a phone or know their numbers. Writer showed pt the pt phones, explained how to operate them and gave pt 's phone number.     Pt reported lower back pain. Writer offered hot/cold pack or tylenol. Pt took tylenol with scheduled medication, which was effective. Pt ate lunch in the dining area and has been resting in her room since admission. Writer assisted pt in calling - got voicemail. Pt declined to leave a message. Will continue to monitor and assist as needed.  "

## 2021-11-05 NOTE — PROGRESS NOTES
SPIRITUAL HEALTH SERVICES  SPIRITUAL ASSESSMENT Progress Note  [facility] ([facility]) 22N     REFERRAL SOURCE: Pt request    Met with pt via Jennifer  on ipad.  Pt said that she is Sabianist, and will often spend time praying when she feels overwhelmed or sad.  Pt accepted prayer and supportive presence, and I let her know that [facility] would continue to be available.    PLAN: [facility] will remain available     Matilde Johnson  Pager: [phone]

## 2021-11-05 NOTE — DISCHARGE INSTRUCTIONS
Behavioral Discharge Planning and Instructions    Summary: You were admitted on 11/5/2021 from [facility]'s Emergency Department due to concerns that you had symptoms of catanoia.  You were treated by Dr. Pro Mcgee and discharged on 11/5/2021 from  to Home    Main Diagnosis: Seizure Disorder     Health Care Follow-up:     Tuesday December 14 2021 - 9:30 AM Calixto Busby MD - Neurologist   [facility] [place] [address] ph: [phone]    Follow up with Neurology as previously scheduled by your primary care providers.     Attend all scheduled appointments with your outpatient providers. Call at least 24 hours in advance if you need to reschedule an appointment to ensure continued access to your outpatient providers.     Major Treatments, Procedures and Findings:  You were provided with: a psychiatric assessment, assessed for medical stability, medication evaluation and/or management and milieu management    Symptoms to Report: increased confusion, mood getting worse or thoughts of suicide    Early warning signs can include: sleep disturbances increased unusual thinking, such as paranoia or hearing voices    Safety and Wellness:  Take all medicines as directed.  Make no changes unless your doctor suggests them.      Follow treatment recommendations.  Refrain from alcohol and non-prescribed drugs.     Resources:   Crisis Intervention: [phone] or [phone] (TTY: [phone]).  Call anytime for help.  National [place] on Mental Illness (www.mn.nguyen.org): 361.821.2800 or 555-634-4793.  [facility] Crisis Response - Adult [phone]    General Medication Instructions:   See your medication sheet(s) for instructions.   Take all medicines as directed.  Make no changes unless your doctor suggests them.   Go to all your doctor visits.  Be sure to have all your required lab tests. This way, your medicines can be refilled on  time.  Do not use any drugs not prescribed by your doctor.  Avoid alcohol.    Advance Directives:   Scanned document on file with [facility]? No scanned doc  Is document scanned? Pt states no documents  Honoring Choices Your Rights Handout: Informed and given  Was more information offered? Pt declined    The Treatment team has appreciated the opportunity to work with you. If you have any questions or concerns about your recent admission, you can contact the unit which can receive your call 24 hours a day, 7 days a week. They will be able to get in touch with a Provider if needed. The unit number is [phone] .

## 2021-11-05 NOTE — PLAN OF CARE
Discharge note    Pt discharged at 1835. Picked up by family member at [facility]. Discharge instructions given to patient including medications and follow up appointment. Pt denies SI and hallucinations. Pt has no further questions.     Discharge instructions given via .

## 2021-11-05 NOTE — ED NOTES
Pt transferring to [place] in mental health department. Pt calm and cooperative. Used language line to communicate with pt. Family updated regarding transfer and verbalized understanding. Nurse to nurse report given to Jimena Addison

## 2021-11-05 NOTE — ED NOTES
"[facility] ED Handoff Report    ED Chief Complaint: Catatonia    ED Diagnosis:  (F32.A) Depression, unspecified depression type  Comment:   Plan:     (F06.1) Catatonia  Comment:   Plan:        PMH:  No past medical history on file.     Code Status:  No Order     Falls Risk: Yes Band: Applied    Current Living Situation/Residence: lives with a significant other     Elimination Status: Continent: Yes     Activity Level: SBA    Patients Preferred Language: Jennifer      Needed: Yes    Vital Signs:  /55   Pulse 93   Temp 97.7  F (36.5  C) (Oral)   Resp 18   Ht 1.549 m (5' 1\")   Wt 54.4 kg (120 lb)   LMP 10/28/2021 (Approximate)   SpO2 99%   BMI 22.67 kg/m       Cardiac Rhythm: NSR    Pain Score: 0/10    Is the Patient Confused:  Yes    Last Food or Drink: 11/4/21 at 1200    Focused Assessment:  Neuro    Tests Performed: Done: Labs    Treatments Provided:  Fluids    Family Dynamics/Concerns: No    Family Updated On Visitor Policy: Yes    Plan of Care Communicated to Family: Yes    Who Was Updated about Plan of Care:     Belongings Checklist Done and Signed by Patient: Yes    Covid: asymptomatic , negative    Additional Information: JING    RN: Shoaib Shepard  11/5/2021 6:58 AM         "

## 2021-11-05 NOTE — ED PROVIDER NOTES
"EMERGENCY DEPARTMENT NOTE     Name: Norman Watts    Age/Sex: 31 year old female   MRN: [id number]   Evaluation Date & Time:  11/4/2021  8:39 PM    PCP:    Ivania Cruz   ED Provider: Ike Mchugh D.O.       CHIEF COMPLAINT    hallucinations/sleepy       DIAGNOSIS & DISPOSITION     1. Depression, unspecified depression type    2. Catatonia      DISPOSITION: Admit to psychiatry    At the conclusion of the encounter I discussed the results of all of the tests and the disposition. The questions were answered. The patient or family acknowledged understanding and was agreeable with the care plan.    TOTAL CRITICAL CARE TIME (EXCLUDING PROCEDURES): Not applicable    PROCEDURES:   None    EMERGENCY DEPARTMENT COURSE/MEDICAL DECISION MAKING   9:29 PM I met with the patient to gather history and to perform my initial exam.  We discussed treatment options and the plan for care while in the Emergency Department. PPE: N95, surgical mask and cap, face shield.   10:56 PM Spoke to DEC regarding the patient's case. They agree that she thinks she has some depression maybe catatonia or psychotic features. Once she is cleared medically, they will work on inpatient psych placement.   11:04 PM Patient signed out to Dr. Rhonda MD.     Norman Watts is a 31 year old female with relevant past history of GI disorder and depression who presents to the emergency department for evaluation of altered mental status  Triage note reviewed:Pt comes to ED for evaluation of hallucinations and sleepiness. Per family member, patient appeared to be talking and laughing to someone that was not in room.   Vital signs:BP 98/61   Pulse 104   Temp 99.1  F (37.3  C) (Temporal)   Resp 18   Ht 1.549 m (5' 1\")   Wt 54.4 kg (120 lb)   LMP 10/28/2021 (Approximate)   SpO2 98%   BMI 22.67 kg/m    Pertinent physical exam findings:  General: Alert attentive to external environment, follows commands but is nonverbal  HEENT: Head atraumatic, neck " supple without meningeal irritation  Cardiac: Regular rate and rhythm S1-S2 without murmur rub  Pulmonary: Lungs are clear to ascultation bilaterally with good breath sounds  Abdomen: Soft nontender, positive bowel sounds.  No organomegaly or mass  Neuro: Patient has 5 out of 5 motor strength of upper and lower extremity.  Cranial nerves II through XII are grossly intact although the patient has been nonverbal while in the emergency department.  Patient is seen to ambulate in the emergency department and does not appear to have ataxia  Diagnostic studies:  Imaging: CT of the head: Pending.  On my evaluation I do not see any mass bleed or infarct  Lab:EKG: Sinus tachycardia  WBC 12, hemoglobin 10.7, comprehensive metabolic profile within normal limits.  hCG negative.  TSH 2.2 urinalysis without pyuria or bacteriuria, urine drug screen negative, alcohol level nondetectable  EKG Impression: Sinus tachycardia  Interventions: IV lorazepam  Medical decision making: Patient is remained alert.  With prior history of depression and current negative work-up this appears to represent has significant depression with catatonia and possible psychotic features.  Patient has been evaluated by the DEC asessor and current plan is for inpatient psychiatric admission.  Patient was signed out at change of shift to Dr. Miller pending psychiatric bed placement will follow up formal read of CT scan.    ED INTERVENTIONS     Medications   LORazepam (ATIVAN) injection 0.5 mg (0.5 mg Intravenous Given 11/4/21 2202)       DISCHARGE MEDICATIONS        Review of your medicines      You have not been prescribed any medications.           INFORMATION SOURCE AND LIMITATIONS    History/Exam limitations: Patient's altered mental status  Patient information was obtained from: Patient's  and patient  Use of : Yes (AC) - Language Jennifer    HISTORY OF PRESENT ILLNESS   Norman Watts female with a relevant past history of seizures, who  presents to this ED via walk in for evaluation of hallucinations and fatigue.    History is limited secondary to patient's altered mental status.     She has a history of seizures and is on Keppra and lamotrigine. Patient missed one day of these medications because they were late to pick them up at the pharmacy. Per patient's , last night the patient had a 1 minute seizure and was biting her lips. She was not incontinent and did not fall or hit her head during the episode. Following the seizure, patient slept for hours. When she woke up she was talking to herself and laughing.  states that she was seeing and hearing things.     Patient has a history of a similar episode in 2019 where she was admitted to [facility]. At present  states that she has a history of depression, but has not had any complaints of this recently. He states that the patient has been confused today and is not aware of what happened. She has been walking around, eating, drinking, and able to go to the bathroom by herself today.     No recent illness.  unsure if she has had a fever. No drug or alcohol use. Patient denies headache.         REVIEW OF SYSTEMS:   Constitutional: Positive for fatigue (sleepiness) per patient's . Negative for fever.   HENT: Negative for URI symptoms or sore throat.    Cardiac: Negative for  chest pain,palpitations, near syncope or syncope  Respiratory: Negative for cough and shortness of breath.    Gastrointestinal: Negative for abdominal pain, nausea, vomiting, constipation, diarrhea, rectal bleeding or melena.  Genitourinary: Negative for dysuria, flank pain and hematuria.   Musculoskeletal: Negative for back pain.   Skin: Negative for  rash  Neurological: Positive for 1 minute seizure (last night). Positive for visual and auditory hallucinations (per patient's ). Negative for dizziness, headache, syncope, speech difficulty, unilateral weakness or imbalance with walking.    Hematological: Negative for adenopathy. Does not bruise/bleed easily.   Psychiatric/Behavioral: Positive for confusion (per patient's )      PATIENT HISTORY   No past medical history on file.  There is no problem list on file for this patient.    No past surgical history on file.  Social Histrory  Smoking:  Alcohol Use:  No Known Allergies      OUTPATIENT MEDICATIONS     New Prescriptions    No medications on file      Vitals:    11/04/21 2215 11/04/21 2230 11/04/21 2245 11/04/21 2300   BP: 101/59 92/59 94/58 98/61   Pulse: 109 104 100 104   Resp:       Temp:       TempSrc:       SpO2: 97% 97% 96% 98%   Weight:       Height:           Physical Exam   Constitutional: Alert attentive to external environment follows commands.  Appears well-developed and well-nourished.   HEENT:    Head: Atraumatic.   Neck: Normal range of motion. Neck supple.   Cardiovascular: Normal rate, regular rhythm and normal heart sounds.    Pulmonary/Chest: Normal effort  and breath sounds normal.   Abdominal: Soft. Bowel sounds are normal.   Musculoskeletal: Normal range of motion.   Neurological: Alert  Normal strength.No sensory deficit. No cranial nerve deficit.    Skin: Skin is warm and dry.   Psychiatric: depressed mood and affect.  Patient is withdrawn and remains nonverbal      DIAGNOSTICS    LABORATORY FINDINGS (REVIEWED AND INTERPRETED):  Labs Ordered and Resulted from Time of ED Arrival to Time of ED Departure   BASIC METABOLIC PANEL - Abnormal       Result Value    Sodium 141      Potassium 2.9 (*)     Chloride 105      Carbon Dioxide (CO2) 24      Anion Gap 12      Urea Nitrogen 7 (*)     Creatinine 0.75      Calcium 9.6      Glucose 95      GFR Estimate >90     HEPATIC FUNCTION PANEL - Abnormal    Bilirubin Total 1.4 (*)     Bilirubin Direct 0.4      Protein Total 8.5 (*)     Albumin 4.6      Alkaline Phosphatase 57      AST 37      ALT 26     ROUTINE UA WITH MICROSCOPIC REFLEX TO CULTURE - Abnormal    Color Urine Yellow       Appearance Urine Clear      Glucose Urine Negative      Bilirubin Urine Negative      Ketones Urine 40  (*)     Specific Gravity Urine 1.025      Blood Urine Negative      pH Urine 6.0      Protein Albumin Urine 20  (*)     Urobilinogen Urine <2.0      Nitrite Urine Negative      Leukocyte Esterase Urine Negative      Mucus Urine Present (*)     RBC Urine 2      WBC Urine 3      Squamous Epithelials Urine 2 (*)    CBC WITH PLATELETS AND DIFFERENTIAL - Abnormal    WBC Count 12.1 (*)     RBC Count 5.55 (*)     Hemoglobin 10.7 (*)     Hematocrit 33.7 (*)     MCV 61 (*)     MCH 19.3 (*)     MCHC 31.8      RDW 17.0 (*)     Platelet Count 195      % Neutrophils 71      % Lymphocytes 20      % Monocytes 7      % Eosinophils 2      % Basophils 0      % Immature Granulocytes 0      NRBCs per 100 WBC 0      Absolute Neutrophils 8.7 (*)     Absolute Lymphocytes 2.4      Absolute Monocytes 0.8      Absolute Eosinophils 0.2      Absolute Basophils 0.1      Absolute Immature Granulocytes 0.0      Absolute NRBCs 0.0     RBC AND PLATELET MORPHOLOGY - Abnormal    Platelet Assessment        Value: Automated Count Confirmed. Platelet morphology is normal.    Acanthocytes Slight (*)     Elliptocytes Slight (*)     Polychromasia Slight (*)     Target Cells Slight (*)     RBC Morphology Confirmed RBC Indices     ETHYL ALCOHOL LEVEL - Normal    Alcohol, Blood <10     MAGNESIUM - Normal    Magnesium 2.0     AMMONIA - Normal    Ammonia 23     HCG QUALITATIVE PREGNANCY - Normal    hCG Serum Qualitative Negative     CRP INFLAMMATION - Normal    CRP 0.1     PROCALCITONIN - Normal    Procalcitonin <0.02     TSH WITH FREE T4 REFLEX - Normal    TSH 2.20     DRUGS OF ABUSE 1+ PANEL, URINE ([facility] ONLY)    Amphetamines Urine Screen Negative      Benzodiazepines Urine Screen Negative      Opiates Urine Screen Negative      PCP Urine Screen Negative      Cannabinoids Urine Screen Negative      Barbiturates Urine Screen Negative      Cocaine  Urine Screen Negative      Methadone Urine Screen Negative      Oxycodone Urine Screen Negative      Creatinine Urine mg/dL 313     KEPPRA (LEVETIRACETAM) LEVEL   LAMOTRIGINE LEVEL         IMAGING (REVIEWED AND INTERPRETED):  Head CT w/o contrast    (Results Pending)           ECG (REVIEWED AND INTERPRETED):   ECG:   Performed at: 20:52  HR:  110bpm  Rhythm: Sinus tachycardia  Axis: -23  QRS duration: 78 ms  QTC: 454 ms  ST changes: No ST segment elevation or depression, no T wave inversion,No Q wave  Interpretation: Sinus tachycardia   No prior for comparison    I have reviewed the patient's ECG, with comments made as listed above. Please see scanned image for full interpretation.         I, Scarlet Bailey, am serving as a scribe to document services personally performed by Ike Mchugh D.O., based on my observation and the provider s statements to me.    I, Ike Mchugh D.O., attest that Scarlet Bailey is acting in a scribe capacity, has observed my performance of the services and has documented them in accordance with my direction.    Ike Mchugh D.O.  EMERGENCY MEDICINE   11/04/21  [facility] EMERGENCY DEPARTMENT  [address] [place] [address]  [phone]  Dept: [phone]       Ike Mchugh DO  11/04/21 0610

## 2021-11-05 NOTE — PLAN OF CARE
Assessment/Intervention/Current Symptoms and Care Coordination  - chart review  - team meeting  - team rounds   - post team rounds team meeting / discussion  - per psychiatry - symptoms present in ED concerning for psychosis/catanoia are related to recent seizure - recommend that patient is discharged - medical/nuerology consulted - reports no need for ongoing medical hospitalization.   patient to discharge and follow up with neurology as previously scheduled.   - behavioral team discussion note completed for plan of care.     - initial psychosocial assessment not completed as same day admission and discharge.       Discharge Plan or Goal  Pending stabilization & development of a safe discharge plan.  Considerations include: Return home with outpatient providers      Barriers to Discharge   Patient requires further psychiatric stabilization due to current symptomology      Referral Status  See discharge instructions -       Legal Status  Patient is voluntary

## 2021-11-05 NOTE — ED PROVIDER NOTES
ED Behavioral Health Patient Transition of Care Note    Assuming care from:  Ike Mchugh DO    Brief history:    Norman Watts female with a relevant past history of seizures, who presents to this ED via walk in for evaluation of hallucinations and fatigue.    Per patient's , last night the patient had a 1 minute seizure and was biting her lips. She was not incontinent and did not fall or hit her head during the episode. Following the seizure, patient slept for hours. When she woke up she was talking to herself and laughing.  states that she was seeing and hearing things. patient has been confused today and is not aware of what happened.    Any outstanding medical concerns:  awaiting imaging results - CT Head     CT scan head without any acute issues.  Was some motion degradation but no obvious acute issue.    Has SW seen the patient:  yes     Is the patient on a hold: Voluntary, Holdable    Current plan for disposition: Medical clearance and inpatient psych placement.    Safety concerns:  No, pt has been cooperative    Dietary restrictions:  None, pt can eat and drink ad luan    Have daily medications been ordered: Home medications have been ordered     Significant events during shift: No issues overnight.  Still awaiting placement.  11:00 PM Patient signed out to me by Dr. Mchugh.      1. Depression, unspecified depression type    2. Catatonia      I, Blanche Bishop, am serving as a scribe to document services personally performed by Dr albarado , based on my observations and the provider's statements to me.  I, Nanette Albarado MD, attest that Blanche Bishop is acting in a scribe capacity, has observed my performance of the services and has documented them in accordance with my direction.      Nanette Albarado M.D.  Emergency Medicine  [facility] EMERGENCY DEPARTMENT  [facility] [facility] [place] [address] [phone]  Dept: [phone]       Eva Albarado,  MD  11/05/21 0551

## 2021-11-06 LAB — LAMOTRIGINE SERPL-MCNC: 1.7 UG/ML

## 2021-11-06 NOTE — DISCHARGE SUMMARY
"Discharge Summary    Norman Watts MRN# [id number]   Age: 31 year old YOB: 1990      Date of Admission:                         11/5/2021  Admitting Physician:       Pro Mcgee MD  Discharge Physician:        Pro Mcgee MD          Contacts:      Primary Outpatient Psychiatrist: Not currently following with one  Primary Physician: Ivania Cruz          Chief Complaint:      \"Hallucinations\"          History of Present Illness:      History obtained from patient and electronic chart     Norman Watts is a 31 year old female with a past psychiatric history of depression and medical hx notable for tonic clonic seizures currently prescribed Keppra and lamotrigine admitted from the ER on 11/06/2021 due to concern for hallucinations following a 1 min seizure last night in the context of medication non-adherence.     Per ED Note:   \"She has a history of seizures and is on Keppra and lamotrigine. Patient missed one day of these medications because they were late to pick them up at the pharmacy. Per patient's , last night the patient had a 1 minute seizure and was biting her lips. She was not incontinent and did not fall or hit her head during the episode. Following the seizure, patient slept for hours. When she woke up she was talking to herself and laughing.  states that she was seeing and hearing things.\"      \"Patient has a history of a similar episode in 2019 where she was admitted to [facility]. At present  states that she has a history of depression, but has not had any complaints of this recently. He states that the patient has been confused today and is not aware of what happened. She has been walking around, eating, drinking, and able to go to the bathroom by herself today.\"      \"In the ED, the pt was not responding to any questions, has mutism and staring straight a head, pt seems to have catatonia.\"     She was medically cleared for admission to inpatient " "psychiatric unit.     Per patient report:    Norman Watts was interviewed in her room with the assistance of a Gertrudis interpretor. She reports that she has no memory of yesterday's events, and is unsure if she had a seizure or not. She reports a history of seizures after which she becomes confused and \"sleeps a lot\". She is unsure of when she last took her antiepileptic medications. She states that she sometimes feels sad, but denies any persistent symptoms that last longer than a few hours. She denies having any auditory or visual hallucinations. When asked if she'd be okay discharging today, she states that she is concerned that her arms and legs \"feel heavy\". She denies any suicidal thoughts or homicidal thoughts. No noted psychiatric symptoms of any kind endorse during assessment     ED/Hospital Course    Patient had a largely unremarkable ED course. Her was HR was in the 110s on presentation with otherwise normal vitals. IV Ativan given in ED for suspected catatonia.      The risks, benefits, alternatives and side effects have been discussed and are understood by the patient and other caregivers.          Psychiatric Review of Systems:      Negative             Medications:      Current Outpatient Prescriptions          Current Outpatient Medications   Medication Sig Dispense Refill     lamoTRIgine (LAMICTAL) 25 MG tablet Take 50 mg by mouth daily         levETIRAcetam (KEPPRA) 1000 MG tablet Take 1,000 mg by mouth daily         metoprolol succinate ER (TOPROL-XL) 50 MG 24 hr tablet Take 50 mg by mouth daily         vitamin D2 (ERGOCALCIFEROL) 73048 units (1250 mcg) capsule Take 50,000 Units by mouth once a week                     Psychiatric Examination:      24 Hour Vital Signs Summary:  SpO2: 100 % (SpO2  Min: 100 %  Max: 100 %)  Pulse: 100 (Pulse  Min: 100  Max: 100)  BP: 98/67  Systolic (24hrs), Av , Min:98 , Max:98   Diastolic (24hrs), Av, Min:67, Max:67     Appearance:  awake, alert, adequately " "groomed, dressed in hospital scrubs and appeared as age stated  Attitude:  cooperative  Eye Contact:  good  Mood: \"okay\"  Affect:  mood congruent and generally neutral  Speech:  clear, coherent (per interpretors report)  Psychomotor Behavior:  no evidence of tardive dyskinesia, dystonia, or tics; no evidence of catatonia  Thought Process:  logical and linear  Associations:  no loose associations  Thought Content:  no evidence of suicidal ideation or homicidal ideation and no evidence of psychotic thought  Insight:  fair  Judgment:  intact  Oriented to:  time, person, and place  Attention Span and Concentration:  intact  Recent and Remote Memory:  limited  Language:  Jennifer, with appropriate syntax and vocabulary per interpretor  Fund of Knowledge: Not formally assessed  Muscle Strength and Tone: Not assessed  Gait and Station: Normal          Physical Exam:      See ED assessment note by ED physician on 11/4         Assessment   Paw RASTA Watts is a 31 year old Gertrudis female with a past psychiatric history of depression and medical hx notable for tonic clonic seizures currently prescribed Keppra and lamotrigine admitted from the ER on 11/05/2021 due to concern for hallucinations following a 1-3 min seizure last night in the context of medication non-adherence. In the ED, the pt was not responding to any questions, had mutism and staring straight ahead which prompted the thought of catatonia which lead to transfer and admission to [place]. Her lab workup and imaging were unremarkable, though head CT was limited by motion. She has a prior hx of similar hospitalizations and seizure episodes that are followed by visual hallucinations. She has a hx of seizure disorder and has recently not been taking her medications, likely resulting in a recurrent seizure last evening. At time of assessment shortly after arriving on [facility] [address], was endorsing no psychiatric symptoms, including hallucinations, mutism, or any other symptoms. " "     We have considered that the patient may be having recurrent seizures which would lead to the symptoms observed in the ED (mutism, staring, etc.) as opposed to catatonia. This would indicate that her auditory/visual hallucinations may be recurrent post-ictal episodes. At this point, her symptoms can most likely be explained by her history of seizure disorder in the context of recent medication non-compliance. She reports \"heaviness\" in her arms and legs which is a common symptom following seizure. Her hallucinatory symptoms as stated by her  are likely secondary to a post-ictal period which, apparently, she has a history of very similar episodes. She does not meet criteria for a mood disorder and certainly does not meet crtieria for a psychotic disorder at this time and therefore would not benefit from inpatient psychiatric hospitalization. She states that she does not have recollection of yesterdays events but her MSE is unremarkable. There is no psychiatric reason for admission, and neurology consult notes no reason for admission to medicine for observation, so patient is appropriate for discharge. Presentation is not fitting with catatonia, psychosis, or a depressive disorder     Risk for harm is low.  Risk factors: None identified  Protective factors: family and hospitalization      Principal psychiatric diagnosis:     History of MDD, no current symptoms     Medications:   Outpatient medications held:       None     Outpatient medications continued:     Lamotrigine 50 mg PO QID     Keppra, 1000 mg PO QID     New medications initiated:     None      Medications: risks/benefits discussed with patient     Patient will be treated in therapeutic milieu with appropriate individual and group therapies.     Legal Status:   Voluntary admission     Safety Assessment:        Pt has not required locked seclusion or restraints in the past 24 hours to maintain safety, please refer to RN documentation for further " "details.     Consults:    Neurology for evaluation of \"arm and leg heaviness\" and medication management in patient with seizure disorder     Medical diagnoses to be addressed this admission:     Seizure disorder  ? Currently on Lamotrigine and Keppra, has not taken recently     Dispo: Discharge to home with follow-up as scheduled with family medicine and neurology.     Patient seen and discussed with attending physician, Dr. Pro Mcgee who is in agreement with my assessment and plan.    Daniel Aguilar, PGY-1 (Psychiatry)  [facility]    Attending Attestation:  The patient has been seen and evaluated by me, Pro Mcgee . I have examined the patient today and reviewed the discharge plan with the resident. I agree with the final assessment and plan, as noted in the discharge summary. I have reviewed today's vital signs, medications, labs and imaging.    Total time discharge plannin minutes  Pro Hurt MD on 2021 at 10:52 PM    "

## 2021-11-06 NOTE — CONSULTS
"[facility]  GENERAL NEUROLOGY CONSULT     Patient Name:  Norman Watts  MRN:  [id number]    :  1990  Date of Service:  2021      Neurology consultation service was asked to see Norman Watts by Dr. Daniel Aguilar to evaluate for breakthrough seizure.    CC: seizure    History of Present Illness:   Norman Watts is a 31 year old female with history of depression and seizures who presents with reported hallucinations and seizure. Patient is primarily Gertrudis speaking in the assistance of an  was utilized. Patient is uncertain why she is admitted to the hospital. She does report having a seizure 2 days ago. This was witnessed by her . Her  was not present to corroborate the story. She states that she had been taking lamotrigine and Keppra but stopped taking these 2 medications. When questioned about this, she describes taking Tylenol instead. Admittedly, there was language barriers despite having an . It seemed as if she thought that Tylenol would be able to take the place of Keppra and lamotrigine. I encouraged her to restart the Keppra and lamotrigine and to remain compliant on these medications. She states that she has had seizures \"for a long time\". She is unable to characterize her seizures. Her  has observed her seizures but he has never explained what they look like. Since admission, she has been restarted on her 2 AEDs and no seizure-like activities have been observed.    ROS  A 10-point ROS was performed as per HPI. Pertinent negatives:. Positives:       PMH  No past medical history on file.  No past surgical history on file.    Medications   No medications prior to admission.       Allergies  No Known Allergies    Social History  I have reviewed this patient's social history    Family History    This patient has no significant family history    Physical Examination   Vitals: BP 98/67 (BP Location: Left arm)   Pulse 100   Temp 98.4  F (36.9  C) " (Oral)   Resp 14   LMP 10/28/2021 (Approximate)   SpO2 100%   General: Adult, in NAD, cooperative  HEENT: Mucous membranes moist, sclera anicteric  Cardiac: RRR no M  Chest: No respiratory distress on room air  Extremities: No LE swelling  Skin: No rash or lesion on face or arms  Psych: Mood flat, affect blunted  Neuro:  Mental status: Awake, alert, attentive, oriented. Able to name common objects and repeat simple sentences. Able to follow simple and complex crossed commands.   Cranial nerves: Eyes conjugate, PERRLA, EOMI, face symmetric, facial sensation intact, shoulder shrug strong, tongue/uvula midline, not dysarthric.  Motor: Tone within normal. No atrophy or twitches.     Right Left   Shoulder abduction:      5 5   Elbow Flexion: 5 5   Elbow Extension:            5 5   Wrist Extension:          5 5               5 5   Finger Flexion 5 5   Wrist Flexion 5 5   Hip Flexion 5 5   Knee Extension 5 5   Knee Flexion 5 5   Dorsiflexion 5 5   Plantar flexion 5 5     Reflexes: Normoreflexic and symmetric biceps, patellar, and achilles. Toes down-going.   Sensory: Intact to LT, cold temp, vibration in BUE/BLE without differences in BUE/BLE or when comparing left and right body  Coordination: FNF no dysmetria, HTS & KAUR normal  Gait: Normal width, stride length, turn, and arm swing. Station normal.     Investigations   Head CT unremarkable    Impression and Recommendations  Norman Watts is a 31 year old female with depression admitted to [facility] due to concern for hallucinations. She has a past history of seizures and treated with lamotrigine and Keppra. Patient reports noncompliance with her AEDs resulting in a suspected breakthrough seizure. Patient is primarily Gertrudis speaking and this story was not able to be corobarated. No signs/symptoms of infection. Denies illicit drug use. Neurologic exam is normal with unremarkable sensory exam.     RECOMMENDATIONS:  > Restart PTA Keppra and  lamotrigine   > Stressed the importance of compliance to AEDs  > Seizure precautions discussed   > Follow up with outpatient neurology       Patient seen and plan of care communicated to primary team.    Teresa Harden DO   of Neurology   [facility]  Pager [phone]

## 2021-12-14 ENCOUNTER — LAB (OUTPATIENT)
Dept: LAB | Facility: HOSPITAL | Age: 31
End: 2021-12-14
Payer: COMMERCIAL

## 2021-12-14 ENCOUNTER — OFFICE VISIT (OUTPATIENT)
Dept: NEUROLOGY | Facility: CLINIC | Age: 31
End: 2021-12-14
Payer: COMMERCIAL

## 2021-12-14 VITALS
WEIGHT: 100.4 LBS | HEIGHT: 61 IN | HEART RATE: 80 BPM | DIASTOLIC BLOOD PRESSURE: 55 MMHG | BODY MASS INDEX: 18.96 KG/M2 | SYSTOLIC BLOOD PRESSURE: 90 MMHG

## 2021-12-14 DIAGNOSIS — G40.802 FRONTAL LOBE EPILEPSY (H): ICD-10-CM

## 2021-12-14 DIAGNOSIS — R56.9 SEIZURES (H): ICD-10-CM

## 2021-12-14 PROBLEM — G40.309 GENERALIZED CONVULSIVE EPILEPSY (H): Status: ACTIVE | Noted: 2017-05-19

## 2021-12-14 PROBLEM — G40.209 NONINTRACTABLE EPILEPSY WITH COMPLEX PARTIAL SEIZURES (H): Status: ACTIVE | Noted: 2017-05-19

## 2021-12-14 LAB
ALBUMIN SERPL-MCNC: 4.5 G/DL (ref 3.5–5)
ALP SERPL-CCNC: 64 U/L (ref 45–120)
ALT SERPL W P-5'-P-CCNC: 25 U/L (ref 0–45)
ANION GAP SERPL CALCULATED.3IONS-SCNC: 9 MMOL/L (ref 5–18)
AST SERPL W P-5'-P-CCNC: 25 U/L (ref 0–40)
BILIRUB SERPL-MCNC: 1.3 MG/DL (ref 0–1)
BUN SERPL-MCNC: 11 MG/DL (ref 8–22)
CALCIUM SERPL-MCNC: 9.8 MG/DL (ref 8.5–10.5)
CHLORIDE BLD-SCNC: 105 MMOL/L (ref 98–107)
CO2 SERPL-SCNC: 25 MMOL/L (ref 22–31)
CREAT SERPL-MCNC: 0.75 MG/DL (ref 0.6–1.1)
GFR SERPL CREATININE-BSD FRML MDRD: >90 ML/MIN/1.73M2
GLUCOSE BLD-MCNC: 85 MG/DL (ref 70–125)
LEVETIRACETAM (KEPPRA): 21.5 UG/ML (ref 6–46)
POTASSIUM BLD-SCNC: 3.9 MMOL/L (ref 3.5–5)
PROT SERPL-MCNC: 8.2 G/DL (ref 6–8)
SODIUM SERPL-SCNC: 139 MMOL/L (ref 136–145)

## 2021-12-14 PROCEDURE — 99215 OFFICE O/P EST HI 40 MIN: CPT | Performed by: PSYCHIATRY & NEUROLOGY

## 2021-12-14 PROCEDURE — 80177 DRUG SCRN QUAN LEVETIRACETAM: CPT

## 2021-12-14 PROCEDURE — 80175 DRUG SCREEN QUAN LAMOTRIGINE: CPT

## 2021-12-14 PROCEDURE — 82040 ASSAY OF SERUM ALBUMIN: CPT

## 2021-12-14 PROCEDURE — 36415 COLL VENOUS BLD VENIPUNCTURE: CPT

## 2021-12-14 RX ORDER — LAMOTRIGINE 100 MG/1
TABLET ORAL
Qty: 90 TABLET | Refills: 1 | Status: SHIPPED | OUTPATIENT
Start: 2021-12-14 | End: 2022-01-14

## 2021-12-14 ASSESSMENT — MIFFLIN-ST. JEOR: SCORE: 1112.79

## 2021-12-14 NOTE — PATIENT INSTRUCTIONS
Patient Education     Discharge Instructions for Epilepsy  You have been diagnosed with epilepsy, a disorder of recurring seizures. When you have a seizure, an electrical disturbance happens in your brain. There are different kinds of seizures, and each person may have one or many types of seizures. Here are some guidelines for you and your family.  If you have a seizure  Ask friends and family members to learn seizure management. Also, tell them to do the following if you have a seizure:    Clear the area to prevent injury.    Position you on a flat, carpeted surface, if possible.    Don t try to restrain you.    Don t put anything in your mouth.    Turn you onto your side if you start to vomit.    Keep track of the date and time the seizure started, how long it lasted, whether or not you lost consciousness, a description of your body movements, what provoked the seizure (if known), and any injuries you suffered. Using a watch may help keep correct time of events.      Stay with you until you regain consciousness.    Call 911 if the seizure is longer than 5 minutes, if there are multiple seizures, or if you don't start to wake up after the seizure stops.    Activities  Following are some things to consider:    Enjoy your normal activities. Most people with epilepsy lead normal lives.    Don't do hazardous activities, such as mountain climbing or scuba diving. A seizure under these conditions could lead to a fatal accident.    Don't swim alone or participate in other similar activities without others nearby.    Ask your healthcare provider about any restrictions on driving or other activities.    Check with your state department of public safety to learn whether there are any driving limitations based on your condition.  Other home care  Other considerations:    Take your medicine exactly as directed. Skipping doses can affect the way your body handles the medicine, which could cause you to have a  seizure.    Don t drink alcohol or use any medicine without talking with your healthcare provider first.    Seizure medicines may interact with other medicines. Make sure all of your healthcare providers have a list of all your medicines.     Birth control pills may not work as effectively when taking seizure medicines. Ask your healthcare provider if a change in birth control is needed.     Wear a medical alert pendant or bracelet that alerts others to your condition, especially if you are allergic to seizure medicine.    Join a local support group. Ask your healthcare provider for names and phone numbers.  Call 911  Tell your family members or friends to call 911 right away if you have:    Seizure that lasts more than 5 minutes    Multiple seizures in a row    No recovery of consciousness after the seizure stops  When to call your healthcare provider  Have family members or friends call your healthcare provider right away if you have:    Seizures that are getting longer and worse    Seizures that are different from those you ve had in the past    Seizures strong enough to cause injury    Skin rash    Fever of 100.4 F (38 C) or higher, or as directed by your healthcare provider  Negin last reviewed this educational content on 4/1/2018 2000-2021 [employer]. All rights reserved. This information is not intended as a substitute for professional medical care. Always follow your healthcare professional's instructions.

## 2021-12-14 NOTE — LETTER
2021         RE: Norman Watts  [address] [place] [address]        Dear Colleague,    Thank you for referring your patient, Norman Watts, to the [facility] [place]. Please see a copy of my visit note below.    NEUROLOGY FOLLOW UP VISIT  NOTE       [facility] [place]  [address] [place] [address]  Tel: [phone]  Fax: [phone]  [email or web address]     Norman Watts,  1990, MRN [id number]  PCP: Ivania Cruz  Date: 2021      ASSESSMENT & PLAN     Visit Diagnosis  1. Frontal lobe epilepsy (Dys III Bifrontal, independent)     Frontal lobe epilepsy  31-year-old female with history of depression, beta thalassemia trait, anxiety and depression who is been followed in the clinic for frontal lobe epilepsy.  Previously she was on lamotrigine but was admitted to [facility] in 2019 after she stopped taking lamotrigine due to pregnancy and was started back on low-dose lamotrigine in addition to Keppra.  She has been on Keppra and lamotrigine since then but I feel she can be managed with lamotrigine monotherapy (she was on lamotrigine monotherapy before she quit taking it due to her pregnancy in 2019).  I have recommended:    1.  Increase lamotrigine to 100 mg twice daily and after 1 week increase it to 1-1/2 tablet (150 mg) twice daily  2.  No change in the dose of Keppra.  Continue 1000 mg twice daily  3.  Check Keppra level, lamotrigine level and comprehensive metabolic panel  4.  Follow-up in 1 month when I will gradually taper her off Keppra.  5.  She was counseled about the risk of congenital malformation in women of childbearing age    Thank you again for this referral, please feel free to contact me if you have any questions.    Calixto Busby MD  [facility]  (Formerly, [facility]     HISTORY OF PRESENT ILLNESS     Patient is a 31-year-old female  with history of depression, beta thalassemia trait, anxiety with depression who is been followed in the clinic for frontal lobe epilepsy who returns for follow-up.  She was previously followed by Dr. Josef Herman and is a new patient for me.    She was initially seen in our clinic on 5/24/2017 when she reported she has been having generalized seizures nocturnally since 2012.  Her  describes very tight stiffening of the extremities and at times she would bite her lips or cheek or tongue.  She had no history of incontinence.  She had MRI of the brain that did not show any acute abnormality and EEG was done that showed some asymmetry but no clear epileptiform activity.  She was started on Keppra but it increased the headache along with depression and suicidal ideation.  She was switched to lamotrigine especially as it has less risk of birth defects she was admitted to [facility] in 2019 after she had a breakthrough seizure and had an EEG that showed frontal intermittent rhythmic delta activity and occasionally independent sharp discharges maximal at F7 and F8.  She had stopped taking lamotrigine a month before that admission she was stabilized and started on Keppra and also on lamotrigine.  Since her last visit she reports no further seizures.  For some reason she is on both Keppra and lamotrigine and ever since admission to [facility] no change was made     PROBLEM LIST   Patient Active Problem List   Diagnosis Code     Frontal lobe epilepsy (Dys III Bifrontal, independent) G40.802     Chronic viral hepatitis B without delta agent and without coma (H) B18.1     Anxiety disorder, unspecified type F41.9     Severe single current episode of major depressive disorder, with psychotic features (H) F32.3     Beta thalassemia trait D56.3     Language barrier, cultural differences Z60.3     H/O transfusion of packed red blood cells Z92.89     Supervision of high risk pregnancy, antepartum O09.90      Choroid plexus cyst of fetus, single or unspecified fetus O35.0XX0     Catatonia F06.1         PAST MEDICAL & SURGICAL HISTORY     Past Medical History:   Patient  has a past medical history of Depression, History of blood transfusion, and Seizure disorder (H).    Surgical History:  She  has a past surgical history that includes no history of surgery.     SOCIAL HISTORY     Reviewed, and she  reports that she has never smoked. She has never used smokeless tobacco. She reports that she does not drink alcohol and does not use drugs.     FAMILY HISTORY     Reviewed, and family history is not on file.     ALLERGIES     Allergies   Allergen Reactions     Keppra [Levetiracetam] Other (See Comments)     Suicidal ideation         REVIEW OF SYSTEMS     A 12 point review of system was performed and was negative except as outlined in the history of present illness.     HOME MEDICATIONS     Current Outpatient Rx   Medication Sig Dispense Refill     hydrOXYzine (ATARAX) 25 MG tablet Take 1 tablet (25 mg) by mouth 3 times daily as needed for anxiety 30 tablet 1     lamoTRIgine (LAMICTAL) 100 MG tablet Take 1 tablet twice a day for 1 week and afterwards 1-1/2 tablet twice daily 90 tablet 1     levETIRAcetam (KEPPRA) 1000 MG tablet TAKE ONE TABLET ( 1,000 MG ) BY MOUTH DAILY 30 tablet 3     metoprolol succinate ER (TOPROL-XL) 50 MG 24 hr tablet Take 50 mg by mouth daily       vitamin D2 (ERGOCALCIFEROL) 96899 units (1250 mcg) capsule Take 50,000 Units by mouth once a week       folic acid (FOLVITE) 400 MCG tablet Take 2 tablets (800 mcg) by mouth daily (Patient not taking: Reported on 9/3/2021) 180 tablet 3     lamoTRIgine (LAMICTAL) 25 MG tablet Take 50 mg by mouth daily       levETIRAcetam (KEPPRA) 1000 MG tablet Take 1,000 mg by mouth daily       Prenatal Vit-Fe Fumarate-FA (PRENATAL 19) 29-1 MG CHEW Take 1 tablet by mouth daily (Patient not taking: Reported on 9/3/2021) 90 tablet 1         PHYSICAL EXAM     Vital signs  BP  "90/55 (BP Location: Left arm, Patient Position: Sitting)   Pulse 80   Ht 1.549 m (5' 1\")   Wt 45.5 kg (100 lb 6.4 oz)   BMI 18.97 kg/m      Weight:   100 lbs 6.4 oz    Patient is alert and oriented x4 in no acute distress. Vital signs were reviewed and are documented in electronic medical record. Neck was supple, no carotid bruits, thyromegaly, JVD, or lymphadenopathy was noted.   NEUROLOGY EXAM:    Patient s speech was normal with no aphasia or dysarthria. Mentation, and affect were also normal.     Funduscopic exam was normal, with normal cup to disc ratio. Cranial nerves II -XII were intact.     Patient had normal mass, tone and motor strength was 5/5 in all extremities without pronator drift.     Sensation was intact to light touch, pinprick, and vibratory sensation.     Reflexes were 1+ symmetrical with downgoing toes.     No dysmetria noted on FNF or HKS. Romberg was negative.    Gait testing was normal. Able to walk on toes/heels. Tandem walk normal.     DIAGNOSTIC STUDIES     PERTINENT RADIOLOGY  Following imaging studies were reviewed:     MRI BRAIN 4/28/2017  1.  No acute infarcts, mass lesions or hemorrhage.  2.  No structural abnormality or migrational anomaly identified    EEG 5/24/2017  This is a mildly abnormal EEG due to some background asymmetry. This is a nonspecific finding. There are no epileptiform findings on today s study.    EEG 1/2/2019   This is an abnormal awake, drowsy and asleep EEG due to the presence of  1.  Moderate generalized background slowing, that mildly improved over the course of the recording  2.  Frontal intermittent rhythmic delta activity (FIR DA)  3.  Occasional independent sharp wave discharges, maximum F7/F8 derivations, with a white fields during drowsiness and sleep that alternated and laterality.  No clinical events or seizures were captured during the recording of the study     PERTINENT LABS  Following labs were reviewed:  Admission on 11/04/2021, Discharged on " 11/05/2021   Component Date Value     Ventricular Rate 11/04/2021 110      Atrial Rate 11/04/2021 110      [place] Interval 11/04/2021 186      QRS Duration 11/04/2021 78      QT 11/04/2021 336      QTc 11/04/2021 454      P Axis 11/04/2021 -18      R AXIS 11/04/2021 -23      T [place] 11/04/2021 13      Interpretation ECG 11/04/2021                      Value:Sinus tachycardia  Inferior infarct , age undetermined  Abnormal ECG  No previous ECGs available  Confirmed by SEE ED PROVIDER NOTE FOR, ECG INTERPRETATION (4000),  MARIBEL LARSON ([id number]) on 11/5/2021 10:31:20 AM       Alcohol, Blood 11/04/2021 <10      Sodium 11/04/2021 141      Potassium 11/04/2021 2.9*     Chloride 11/04/2021 105      Carbon Dioxide (CO2) 11/04/2021 24      Anion Gap 11/04/2021 12      Urea Nitrogen 11/04/2021 7*     Creatinine 11/04/2021 0.75      Calcium 11/04/2021 9.6      Glucose 11/04/2021 95      GFR Estimate 11/04/2021 >90      Bilirubin Total 11/04/2021 1.4*     Bilirubin Direct 11/04/2021 0.4      Protein Total 11/04/2021 8.5*     Albumin 11/04/2021 4.6      Alkaline Phosphatase 11/04/2021 57      AST 11/04/2021 37      ALT 11/04/2021 26      Magnesium 11/04/2021 2.0      Ammonia 11/04/2021 23      hCG Serum Qualitative 11/04/2021 Negative      CRP 11/04/2021 0.1      Procalcitonin 11/04/2021 <0.02      Color Urine 11/04/2021 Yellow      Appearance Urine 11/04/2021 Clear      Glucose Urine 11/04/2021 Negative      Bilirubin Urine 11/04/2021 Negative      Ketones Urine 11/04/2021 40 *     Specific Gravity Urine 11/04/2021 1.025      Blood Urine 11/04/2021 Negative      pH Urine 11/04/2021 6.0      Protein Albumin Urine 11/04/2021 20 *     Urobilinogen Urine 11/04/2021 <2.0      Nitrite Urine 11/04/2021 Negative      Leukocyte Esterase Urine 11/04/2021 Negative      Mucus Urine 11/04/2021 Present*     RBC Urine 11/04/2021 2      WBC Urine 11/04/2021 3      Squamous Epithelials Uri* 11/04/2021 2*     Levetiracetam 11/04/2021 9.9       Lamotrigine 11/04/2021 1.7*     TSH 11/04/2021 2.20      WBC Count 11/04/2021 12.1*     RBC Count 11/04/2021 5.55*     Hemoglobin 11/04/2021 10.7*     Hematocrit 11/04/2021 33.7*     MCV 11/04/2021 61*     MCH 11/04/2021 19.3*     MCHC 11/04/2021 31.8      RDW 11/04/2021 17.0*     Platelet Count 11/04/2021 195      % Neutrophils 11/04/2021 71      % Lymphocytes 11/04/2021 20      % Monocytes 11/04/2021 7      % Eosinophils 11/04/2021 2      % Basophils 11/04/2021 0      % Immature Granulocytes 11/04/2021 0      NRBCs per 100 WBC 11/04/2021 0      Absolute Neutrophils 11/04/2021 8.7*     Absolute Lymphocytes 11/04/2021 2.4      Absolute Monocytes 11/04/2021 0.8      Absolute Eosinophils 11/04/2021 0.2      Absolute Basophils 11/04/2021 0.1      Absolute Immature Granul* 11/04/2021 0.0      Absolute NRBCs 11/04/2021 0.0      Hold Specimen 11/04/2021 [facility]      Hold Specimen 11/04/2021 [facility]      Platelet Assessment 11/04/2021 Automated Count Confirmed. Platelet morphology is normal.      Acanthocytes 11/04/2021 Slight*     Elliptocytes 11/04/2021 Slight*     Polychromasia 11/04/2021 Slight*     Target Cells 11/04/2021 Slight*     RBC Morphology 11/04/2021 Confirmed RBC Indices      Amphetamines Urine 11/04/2021 Screen Negative      Benzodiazepines Urine 11/04/2021 Screen Negative      Opiates Urine 11/04/2021 Screen Negative      PCP Urine 11/04/2021 Screen Negative      Cannabinoids Urine 11/04/2021 Screen Negative      Barbiturates Urine 11/04/2021 Screen Negative      Cocaine Urine 11/04/2021 Screen Negative      Methadone Urine 11/04/2021 Screen Negative      Oxycodone Urine 11/04/2021 Screen Negative      Creatinine Urine mg/dL 11/04/2021 313      SARS CoV2 PCR 11/05/2021 Negative          Total time spent for face to face visit, reviewing labs/imaging studies, counseling and coordination of care was: 45 Minutes spent on the date of the encounter doing chart review, review of outside records, review of test results,  interpretation of tests, patient visit and documentation       This note was dictated using voice recognition software.  Any grammatical or context distortions are unintentional and inherent to the software.    Orders Placed This Encounter   Procedures     Keppra (Levetiracetam) Level     Lamotrigine Level     Comprehensive metabolic panel      New Prescriptions    No medications on file     Modified Medications    Modified Medication Previous Medication    LAMOTRIGINE (LAMICTAL) 100 MG TABLET lamoTRIgine (LAMICTAL) 25 MG tablet       Take 1 tablet twice a day for 1 week and afterwards 1-1/2 tablet twice daily    TAKE 2 TABLETS ( 50 MG TOTAL) BY MOUTH DAILY                     Again, thank you for allowing me to participate in the care of your patient.        Sincerely,        Calixto Busby MD

## 2021-12-14 NOTE — PROGRESS NOTES
NEUROLOGY FOLLOW UP VISIT  NOTE       [facility] NEUROLOGY [place]  [address] [place] [address]  Tel: [phone]  Fax: [phone]  [email or web address]     Norman RASTA Watts ANA 1990, MRN [id number]  PCP: Ivania Cruz  Date: 2021      ASSESSMENT & PLAN     Visit Diagnosis  Frontal lobe epilepsy (Dys III Bifrontal, independent)     Frontal lobe epilepsy  31-year-old female with history of depression, beta thalassemia trait, anxiety and depression who is been followed in the clinic for frontal lobe epilepsy.  Previously she was on lamotrigine but was admitted to [facility] in 2019 after she stopped taking lamotrigine due to pregnancy and was started back on low-dose lamotrigine in addition to Keppra.  She has been on Keppra and lamotrigine since then but I feel she can be managed with lamotrigine monotherapy (she was on lamotrigine monotherapy before she quit taking it due to her pregnancy in 2019).  I have recommended:    1.  Increase lamotrigine to 100 mg twice daily and after 1 week increase it to 1-1/2 tablet (150 mg) twice daily  2.  No change in the dose of Keppra.  Continue 1000 mg twice daily  3.  Check Keppra level, lamotrigine level and comprehensive metabolic panel  4.  Follow-up in 1 month when I will gradually taper her off Keppra.  5.  She was counseled about the risk of congenital malformation in women of childbearing age    Thank you again for this referral, please feel free to contact me if you have any questions.    Calixto Busby MD  [facility]  (Former[facility], [facility]     HISTORY OF PRESENT ILLNESS     Patient is a 31-year-old female with history of depression, beta thalassemia trait, anxiety with depression who is been followed in the clinic for frontal lobe epilepsy who returns for follow-up.  She was previously followed by Dr. Josef Herman and is a new patient for me.    She was  initially seen in our clinic on 5/24/2017 when she reported she has been having generalized seizures nocturnally since 2012.  Her  describes very tight stiffening of the extremities and at times she would bite her lips or cheek or tongue.  She had no history of incontinence.  She had MRI of the brain that did not show any acute abnormality and EEG was done that showed some asymmetry but no clear epileptiform activity.  She was started on Keppra but it increased the headache along with depression and suicidal ideation.  She was switched to lamotrigine especially as it has less risk of birth defects she was admitted to [facility] in 2019 after she had a breakthrough seizure and had an EEG that showed frontal intermittent rhythmic delta activity and occasionally independent sharp discharges maximal at F7 and F8.  She had stopped taking lamotrigine a month before that admission she was stabilized and started on Keppra and also on lamotrigine.  Since her last visit she reports no further seizures.  For some reason she is on both Keppra and lamotrigine and ever since admission to [facility] no change was made     PROBLEM LIST   Patient Active Problem List   Diagnosis Code     Frontal lobe epilepsy (Dys III Bifrontal, independent) G40.802     Chronic viral hepatitis B without delta agent and without coma (H) B18.1     Anxiety disorder, unspecified type F41.9     Severe single current episode of major depressive disorder, with psychotic features (H) F32.3     Beta thalassemia trait D56.3     Language barrier, cultural differences Z60.3     H/O transfusion of packed red blood cells Z92.89     Supervision of high risk pregnancy, antepartum O09.90     Choroid plexus cyst of fetus, single or unspecified fetus O35.0XX0     Catatonia F06.1         PAST MEDICAL & SURGICAL HISTORY     Past Medical History:   Patient  has a past medical history of Depression, History of blood transfusion, and Seizure disorder  "(H).    Surgical History:  She  has a past surgical history that includes no history of surgery.     SOCIAL HISTORY     Reviewed, and she  reports that she has never smoked. She has never used smokeless tobacco. She reports that she does not drink alcohol and does not use drugs.     FAMILY HISTORY     Reviewed, and family history is not on file.     ALLERGIES     Allergies   Allergen Reactions     Keppra [Levetiracetam] Other (See Comments)     Suicidal ideation         REVIEW OF SYSTEMS     A 12 point review of system was performed and was negative except as outlined in the history of present illness.     HOME MEDICATIONS     Current Outpatient Rx   Medication Sig Dispense Refill     hydrOXYzine (ATARAX) 25 MG tablet Take 1 tablet (25 mg) by mouth 3 times daily as needed for anxiety 30 tablet 1     lamoTRIgine (LAMICTAL) 100 MG tablet Take 1 tablet twice a day for 1 week and afterwards 1-1/2 tablet twice daily 90 tablet 1     levETIRAcetam (KEPPRA) 1000 MG tablet TAKE ONE TABLET ( 1,000 MG ) BY MOUTH DAILY 30 tablet 3     metoprolol succinate ER (TOPROL-XL) 50 MG 24 hr tablet Take 50 mg by mouth daily       vitamin D2 (ERGOCALCIFEROL) 42118 units (1250 mcg) capsule Take 50,000 Units by mouth once a week       folic acid (FOLVITE) 400 MCG tablet Take 2 tablets (800 mcg) by mouth daily (Patient not taking: Reported on 9/3/2021) 180 tablet 3     lamoTRIgine (LAMICTAL) 25 MG tablet Take 50 mg by mouth daily       levETIRAcetam (KEPPRA) 1000 MG tablet Take 1,000 mg by mouth daily       Prenatal Vit-Fe Fumarate-FA (PRENATAL 19) 29-1 MG CHEW Take 1 tablet by mouth daily (Patient not taking: Reported on 9/3/2021) 90 tablet 1         PHYSICAL EXAM     Vital signs  BP 90/55 (BP Location: Left arm, Patient Position: Sitting)   Pulse 80   Ht 1.549 m (5' 1\")   Wt 45.5 kg (100 lb 6.4 oz)   BMI 18.97 kg/m      Weight:   100 lbs 6.4 oz    Patient is alert and oriented x4 in no acute distress. Vital signs were reviewed and are " documented in electronic medical record. Neck was supple, no carotid bruits, thyromegaly, JVD, or lymphadenopathy was noted.   NEUROLOGY EXAM:    Patient s speech was normal with no aphasia or dysarthria. Mentation, and affect were also normal.     Funduscopic exam was normal, with normal cup to disc ratio. Cranial nerves II -XII were intact.     Patient had normal mass, tone and motor strength was 5/5 in all extremities without pronator drift.     Sensation was intact to light touch, pinprick, and vibratory sensation.     Reflexes were 1+ symmetrical with downgoing toes.     No dysmetria noted on FNF or HKS. Romberg was negative.    Gait testing was normal. Able to walk on toes/heels. Tandem walk normal.     DIAGNOSTIC STUDIES     PERTINENT RADIOLOGY  Following imaging studies were reviewed:     MRI BRAIN 4/28/2017  1.  No acute infarcts, mass lesions or hemorrhage.  2.  No structural abnormality or migrational anomaly identified    EEG 5/24/2017  This is a mildly abnormal EEG due to some background asymmetry. This is a nonspecific finding. There are no epileptiform findings on today s study.    EEG 1/2/2019   This is an abnormal awake, drowsy and asleep EEG due to the presence of  1.  Moderate generalized background slowing, that mildly improved over the course of the recording  2.  Frontal intermittent rhythmic delta activity (FIR DA)  3.  Occasional independent sharp wave discharges, maximum F7/F8 derivations, with a white fields during drowsiness and sleep that alternated and laterality.  No clinical events or seizures were captured during the recording of the study     PERTINENT LABS  Following labs were reviewed:  Admission on 11/04/2021, Discharged on 11/05/2021   Component Date Value     Ventricular Rate 11/04/2021 110      Atrial Rate 11/04/2021 110      AK Interval 11/04/2021 186      QRS Duration 11/04/2021 78      QT 11/04/2021 336      QTc 11/04/2021 454      P Axis 11/04/2021 -18      R AXIS  11/04/2021 -23      T [place] 11/04/2021 13      Interpretation ECG 11/04/2021                      Value:Sinus tachycardia  Inferior infarct , age undetermined  Abnormal ECG  No previous ECGs available  Confirmed by SEE ED PROVIDER NOTE FOR, ECG INTERPRETATION (4000),  MARIBEL LARSON ([id number]) on 11/5/2021 10:31:20 AM       Alcohol, Blood 11/04/2021 <10      Sodium 11/04/2021 141      Potassium 11/04/2021 2.9*     Chloride 11/04/2021 105      Carbon Dioxide (CO2) 11/04/2021 24      Anion Gap 11/04/2021 12      Urea Nitrogen 11/04/2021 7*     Creatinine 11/04/2021 0.75      Calcium 11/04/2021 9.6      Glucose 11/04/2021 95      GFR Estimate 11/04/2021 >90      Bilirubin Total 11/04/2021 1.4*     Bilirubin Direct 11/04/2021 0.4      Protein Total 11/04/2021 8.5*     Albumin 11/04/2021 4.6      Alkaline Phosphatase 11/04/2021 57      AST 11/04/2021 37      ALT 11/04/2021 26      Magnesium 11/04/2021 2.0      Ammonia 11/04/2021 23      hCG Serum Qualitative 11/04/2021 Negative      CRP 11/04/2021 0.1      Procalcitonin 11/04/2021 <0.02      Color Urine 11/04/2021 Yellow      Appearance Urine 11/04/2021 Clear      Glucose Urine 11/04/2021 Negative      Bilirubin Urine 11/04/2021 Negative      Ketones Urine 11/04/2021 40 *     Specific Gravity Urine 11/04/2021 1.025      Blood Urine 11/04/2021 Negative      pH Urine 11/04/2021 6.0      Protein Albumin Urine 11/04/2021 20 *     Urobilinogen Urine 11/04/2021 <2.0      Nitrite Urine 11/04/2021 Negative      Leukocyte Esterase Urine 11/04/2021 Negative      Mucus Urine 11/04/2021 Present*     RBC Urine 11/04/2021 2      WBC Urine 11/04/2021 3      Squamous Epithelials Uri* 11/04/2021 2*     Levetiracetam 11/04/2021 9.9      Lamotrigine 11/04/2021 1.7*     TSH 11/04/2021 2.20      WBC Count 11/04/2021 12.1*     RBC Count 11/04/2021 5.55*     Hemoglobin 11/04/2021 10.7*     Hematocrit 11/04/2021 33.7*     MCV 11/04/2021 61*     MCH 11/04/2021 19.3*     MCHC 11/04/2021 31.8       RDW 11/04/2021 17.0*     Platelet Count 11/04/2021 195      % Neutrophils 11/04/2021 71      % Lymphocytes 11/04/2021 20      % Monocytes 11/04/2021 7      % Eosinophils 11/04/2021 2      % Basophils 11/04/2021 0      % Immature Granulocytes 11/04/2021 0      NRBCs per 100 WBC 11/04/2021 0      Absolute Neutrophils 11/04/2021 8.7*     Absolute Lymphocytes 11/04/2021 2.4      Absolute Monocytes 11/04/2021 0.8      Absolute Eosinophils 11/04/2021 0.2      Absolute Basophils 11/04/2021 0.1      Absolute Immature Granul* 11/04/2021 0.0      Absolute NRBCs 11/04/2021 0.0      Hold Specimen 11/04/2021 [facility]      Hold Specimen 11/04/2021 [facility]      Platelet Assessment 11/04/2021 Automated Count Confirmed. Platelet morphology is normal.      Acanthocytes 11/04/2021 Slight*     Elliptocytes 11/04/2021 Slight*     Polychromasia 11/04/2021 Slight*     Target Cells 11/04/2021 Slight*     RBC Morphology 11/04/2021 Confirmed RBC Indices      Amphetamines Urine 11/04/2021 Screen Negative      Benzodiazepines Urine 11/04/2021 Screen Negative      Opiates Urine 11/04/2021 Screen Negative      PCP Urine 11/04/2021 Screen Negative      Cannabinoids Urine 11/04/2021 Screen Negative      Barbiturates Urine 11/04/2021 Screen Negative      Cocaine Urine 11/04/2021 Screen Negative      Methadone Urine 11/04/2021 Screen Negative      Oxycodone Urine 11/04/2021 Screen Negative      Creatinine Urine mg/dL 11/04/2021 313      SARS CoV2 PCR 11/05/2021 Negative          Total time spent for face to face visit, reviewing labs/imaging studies, counseling and coordination of care was: 45 Minutes spent on the date of the encounter doing chart review, review of outside records, review of test results, interpretation of tests, patient visit and documentation     This note was dictated using voice recognition software.  Any grammatical or context distortions are unintentional and inherent to the software.    Orders Placed This Encounter   Procedures      Keppra (Levetiracetam) Level     Lamotrigine Level     Comprehensive metabolic panel      New Prescriptions    No medications on file     Modified Medications    Modified Medication Previous Medication    LAMOTRIGINE (LAMICTAL) 100 MG TABLET lamoTRIgine (LAMICTAL) 25 MG tablet       Take 1 tablet twice a day for 1 week and afterwards 1-1/2 tablet twice daily    TAKE 2 TABLETS ( 50 MG TOTAL) BY MOUTH DAILY

## 2021-12-15 LAB — LAMOTRIGINE SERPL-MCNC: 2.2 UG/ML

## 2021-12-22 ENCOUNTER — TELEPHONE (OUTPATIENT)
Dept: NEUROLOGY | Facility: CLINIC | Age: 31
End: 2021-12-22
Payer: COMMERCIAL

## 2021-12-22 NOTE — TELEPHONE ENCOUNTER
Called patient with Jennifer  and had a 20min conversation regarding increasing her Lamictal to 1.5 tabs BID. She will continue her Keppra for now and will follow up with Dr. Busby 1/14/22  Cintia Corrales CMA on 12/22/2021 at 4:02 PM

## 2022-01-14 ENCOUNTER — OFFICE VISIT (OUTPATIENT)
Dept: NEUROLOGY | Facility: CLINIC | Age: 32
End: 2022-01-14
Payer: COMMERCIAL

## 2022-01-14 VITALS
DIASTOLIC BLOOD PRESSURE: 62 MMHG | HEART RATE: 89 BPM | SYSTOLIC BLOOD PRESSURE: 94 MMHG | BODY MASS INDEX: 18.88 KG/M2 | HEIGHT: 61 IN | WEIGHT: 100 LBS

## 2022-01-14 DIAGNOSIS — G40.802 FRONTAL LOBE EPILEPSY (H): ICD-10-CM

## 2022-01-14 DIAGNOSIS — R56.9 SEIZURES (H): ICD-10-CM

## 2022-01-14 PROCEDURE — 99214 OFFICE O/P EST MOD 30 MIN: CPT | Performed by: PSYCHIATRY & NEUROLOGY

## 2022-01-14 RX ORDER — LEVETIRACETAM 250 MG/1
TABLET ORAL
Qty: 65 TABLET | Refills: 0 | Status: SHIPPED | OUTPATIENT
Start: 2022-01-14 | End: 2022-04-14

## 2022-01-14 RX ORDER — LAMOTRIGINE 150 MG/1
150 TABLET ORAL 2 TIMES DAILY
Qty: 60 TABLET | Refills: 11 | Status: SHIPPED | OUTPATIENT
Start: 2022-01-14 | End: 2022-12-23

## 2022-01-14 RX ORDER — LAMOTRIGINE 150 MG/1
150 TABLET ORAL 2 TIMES DAILY
Qty: 11 TABLET | Refills: 11 | Status: SHIPPED | OUTPATIENT
Start: 2022-01-14 | End: 2022-01-14

## 2022-01-14 ASSESSMENT — MIFFLIN-ST. JEOR: SCORE: 1105.98

## 2022-01-14 NOTE — LETTER
2022         RE: Norman Watts  [address]  [address]  [place] [address]        Dear Colleague,    Thank you for referring your patient, Norman Watts, to the [facility] [place]. Please see a copy of my visit note below.    NEUROLOGY FOLLOW UP VISIT  NOTE       [facility] [place]  [address] [place] [address]  Tel: [phone]  Fax: [phone]  [email or web address]     Norman Watts,  1990, MRN [id number]  PCP: Ivania Cruz  Date: 2022      ASSESSMENT & PLAN     Visit Diagnosis  1. Frontal lobe epilepsy (H)     Frontal lobe epilepsy  32-year-old female with history of depression, beta thalassemia trait, anxiety and depression who is been followed in our clinic for frontal lobe epilepsy.  Previously she was on lamotrigine but after her admission to [facility] for breakthrough seizure due to noncompliance for some reason she was started on lamotrigine and Keppra.  As she was well managed on lamotrigine monotherapy, dose was ramped up to 150 mg twice daily.  I have recommended:    1.  Continue lamotrigine 150 mg twice daily.  I sent a new prescription to patient's pharmacy  2.  Taper Keppra in 20 days.  I sent in a new prescription for Keppra 250 mg with directions to gradually decrease the dose in 20 days  3.  Check lamotrigine level  4.  Follow-up will be in 3 months    Thank you again for this referral, please feel free to contact me if you have any questions.    Calixto Busby MD  [facility]  (Formerly, [facility]     HISTORY OF PRESENT ILLNESS     Patient is a 32-year-old female with history of depression, beta thalassemia trait, anxiety, depression who is being followed in our clinic for frontal lobe epilepsy.  She was last seen on 2021 and was on both lamotrigine and Keppra.  Previously she had stopped taking lamotrigine due to her pregnancy and was  started back at [facility] both on Keppra and lamotrigine.  I felt she can be managed on lamotrigine monotherapy and I had told her to gradually increase the dose of lamotrigine to 150 mg twice daily.  She was told not to make any change in the dose of Keppra.  She had blood levels checked during her visit and comprehensive metabolic panel was normal, lamotrigine level was 2.2 and Keppra level was therapeutic at 21.5.  Since her last visit she reports she was able to gradually increase the dose of Lamictal to 150 mg twice daily and continues to take Keppra.  She had not experienced any side effects    She was initially seen in our clinic on 5/24/2017 when she reported having seizures since 2012.  Her  describes tight stiffening of her extremities and at times she would bite her lips or cheek or tongue.  There is no history of any urinary incontinence.  MRI of the brain in the past did not show any abnormality.  EEG showed some asymmetry but no clear epileptiform activity.  Initially she was started on Keppra but it caused depression and suicidal ideation and was switched to lamotrigine.  She had a breakthrough seizure in 2019 when she was admitted to [facility] where EEG showed frontal intermittent rhythmic delta activity and occasional independent sharp discharges maximum at F7 and F8.  She had stopped taking lamotrigine before that admission and for some reason both Keppra and lamotrigine were restarted.     PROBLEM LIST   Patient Active Problem List   Diagnosis Code     Frontal lobe epilepsy (Dys III Bifrontal, independent) G40.802     Chronic viral hepatitis B without delta agent and without coma (H) B18.1     Anxiety disorder, unspecified type F41.9     Severe single current episode of major depressive disorder, with psychotic features (H) F32.3     Beta thalassemia trait D56.3     Language barrier, cultural differences Z60.3     H/O transfusion of packed red blood cells Z92.89      Supervision of high risk pregnancy, antepartum O09.90     Choroid plexus cyst of fetus, single or unspecified fetus O35.0XX0     Catatonia F06.1         PAST MEDICAL & SURGICAL HISTORY     Past Medical History:   Patient  has a past medical history of Depression, History of blood transfusion, and Seizure disorder (H).    Surgical History:  She  has a past surgical history that includes no history of surgery.     SOCIAL HISTORY     Reviewed, and she  reports that she has never smoked. She has never used smokeless tobacco. She reports that she does not drink alcohol and does not use drugs.     FAMILY HISTORY     Reviewed, and family history is not on file.     ALLERGIES     Allergies   Allergen Reactions     Keppra [Levetiracetam] Other (See Comments)     Suicidal ideation         REVIEW OF SYSTEMS     A 12 point review of system was performed and was negative except as outlined in the history of present illness.     HOME MEDICATIONS     Current Outpatient Rx   Medication Sig Dispense Refill     lamoTRIgine (LAMICTAL) 150 MG tablet Take 1 tablet (150 mg) by mouth 2 times daily 11 tablet 11     levETIRAcetam (KEPPRA) 250 MG tablet Take 3 tablets twice a day for 5 days, then 2 tablets twice a day for 5 days, then 1 tablet twice a day for 5 days, then 1 tablet once a day for 5 days then stop taking this medicine 65 tablet 0     folic acid (FOLVITE) 400 MCG tablet Take 2 tablets (800 mcg) by mouth daily (Patient not taking: Reported on 9/3/2021) 180 tablet 3     hydrOXYzine (ATARAX) 25 MG tablet Take 1 tablet (25 mg) by mouth 3 times daily as needed for anxiety 30 tablet 1     levETIRAcetam (KEPPRA) 1000 MG tablet Take 1,000 mg by mouth daily       metoprolol succinate ER (TOPROL-XL) 50 MG 24 hr tablet Take 50 mg by mouth daily       vitamin D2 (ERGOCALCIFEROL) 29715 units (1250 mcg) capsule Take 50,000 Units by mouth once a week           PHYSICAL EXAM     Vital signs  BP 94/62 (BP Location: Left arm, Patient Position:  "Sitting)   Pulse 89   Ht 1.549 m (5' 1\")   Wt 45.4 kg (100 lb)   BMI 18.89 kg/m      Weight:   100 lbs 0 oz    Patient is alert and oriented x4 in no acute distress. Vital signs were reviewed and are documented in electronic medical record. Neck was supple, no carotid bruits, thyromegaly, JVD, or lymphadenopathy was noted.   NEUROLOGY EXAM:    Patient s speech was normal with no aphasia or dysarthria. Mentation, and affect were also normal.     Funduscopic exam was normal, with normal cup to disc ratio. Cranial nerves II -XII were intact.     Patient had normal mass, tone and motor strength was 5/5 in all extremities without pronator drift.     Sensation was intact to light touch, pinprick, and vibratory sensation.     Reflexes were 1+ symmetrical with downgoing toes.     No dysmetria noted on FNF or HKS. Romberg was negative.    Gait testing was normal. Able to walk on toes/heels. Tandem walk normal.     PERTINENT DIAGNOSTIC STUDIES     Following studies were reviewed:     MRI BRAIN 4/28/2017  1.  No acute infarcts, mass lesions or hemorrhage.  2.  No structural abnormality or migrational anomaly identified     EEG 5/24/2017  This is a mildly abnormal EEG due to some background asymmetry. This is a nonspecific finding. There are no epileptiform findings on today s study.     EEG 1/2/2019   This is an abnormal awake, drowsy and asleep EEG due to the presence of  1.  Moderate generalized background slowing, that mildly improved over the course of the recording  2.  Frontal intermittent rhythmic delta activity (FIRDA)  3.  Occasional independent sharp wave discharges, maximum F7/F8 derivations, with a white fields during drowsiness and sleep that alternated and laterality.  No clinical events or seizures were captured during the recording of the study     PERTINENT LABS  Following labs were reviewed:  Lab on 12/14/2021   Component Date Value     Levetiracetam 12/14/2021 21.5      Lamotrigine 12/14/2021 2.2*     " Sodium 12/14/2021 139      Potassium 12/14/2021 3.9      Chloride 12/14/2021 105      Carbon Dioxide (CO2) 12/14/2021 25      Anion Gap 12/14/2021 9      Urea Nitrogen 12/14/2021 11      Creatinine 12/14/2021 0.75      Calcium 12/14/2021 9.8      Glucose 12/14/2021 85      Alkaline Phosphatase 12/14/2021 64      AST 12/14/2021 25      ALT 12/14/2021 25      Protein Total 12/14/2021 8.2*     Albumin 12/14/2021 4.5      Bilirubin Total 12/14/2021 1.3*     GFR Estimate 12/14/2021 >90    Admission on 11/04/2021, Discharged on 11/05/2021   Component Date Value     Ventricular Rate 11/04/2021 110      Atrial Rate 11/04/2021 110      WY Interval 11/04/2021 186      QRS Duration 11/04/2021 78      QT 11/04/2021 336      QTc 11/04/2021 454      P Axis 11/04/2021 -18      R AXIS 11/04/2021 -23      T Millis 11/04/2021 13      Interpretation ECG 11/04/2021                      Value:Sinus tachycardia  Inferior infarct , age undetermined  Abnormal ECG  No previous ECGs available  Confirmed by SEE ED PROVIDER NOTE FOR, ECG INTERPRETATION (4000),  MARIBEL LARSON ([id number]) on 11/5/2021 10:31:20 AM       Alcohol, Blood 11/04/2021 <10      Sodium 11/04/2021 141      Potassium 11/04/2021 2.9*     Chloride 11/04/2021 105      Carbon Dioxide (CO2) 11/04/2021 24      Anion Gap 11/04/2021 12      Urea Nitrogen 11/04/2021 7*     Creatinine 11/04/2021 0.75      Calcium 11/04/2021 9.6      Glucose 11/04/2021 95      GFR Estimate 11/04/2021 >90      Bilirubin Total 11/04/2021 1.4*     Bilirubin Direct 11/04/2021 0.4      Protein Total 11/04/2021 8.5*     Albumin 11/04/2021 4.6      Alkaline Phosphatase 11/04/2021 57      AST 11/04/2021 37      ALT 11/04/2021 26      Magnesium 11/04/2021 2.0      Ammonia 11/04/2021 23      hCG Serum Qualitative 11/04/2021 Negative      CRP 11/04/2021 0.1      Procalcitonin 11/04/2021 <0.02      Color Urine 11/04/2021 Yellow      Appearance Urine 11/04/2021 Clear      Glucose Urine 11/04/2021 Negative       Bilirubin Urine 11/04/2021 Negative      Ketones Urine 11/04/2021 40 *     Specific Gravity Urine 11/04/2021 1.025      Blood Urine 11/04/2021 Negative      pH Urine 11/04/2021 6.0      Protein Albumin Urine 11/04/2021 20 *     Urobilinogen Urine 11/04/2021 <2.0      Nitrite Urine 11/04/2021 Negative      Leukocyte Esterase Urine 11/04/2021 Negative      Mucus Urine 11/04/2021 Present*     RBC Urine 11/04/2021 2      WBC Urine 11/04/2021 3      Squamous Epithelials Uri* 11/04/2021 2*     Levetiracetam 11/04/2021 9.9      Lamotrigine 11/04/2021 1.7*     TSH 11/04/2021 2.20      WBC Count 11/04/2021 12.1*     RBC Count 11/04/2021 5.55*     Hemoglobin 11/04/2021 10.7*     Hematocrit 11/04/2021 33.7*     MCV 11/04/2021 61*     MCH 11/04/2021 19.3*     MCHC 11/04/2021 31.8      RDW 11/04/2021 17.0*     Platelet Count 11/04/2021 195      % Neutrophils 11/04/2021 71      % Lymphocytes 11/04/2021 20      % Monocytes 11/04/2021 7      % Eosinophils 11/04/2021 2      % Basophils 11/04/2021 0      % Immature Granulocytes 11/04/2021 0      NRBCs per 100 WBC 11/04/2021 0      Absolute Neutrophils 11/04/2021 8.7*     Absolute Lymphocytes 11/04/2021 2.4      Absolute Monocytes 11/04/2021 0.8      Absolute Eosinophils 11/04/2021 0.2      Absolute Basophils 11/04/2021 0.1      Absolute Immature Granul* 11/04/2021 0.0      Absolute NRBCs 11/04/2021 0.0      Hold Specimen 11/04/2021 JI      Hold Specimen 11/04/2021 [facility]      Platelet Assessment 11/04/2021 Automated Count Confirmed. Platelet morphology is normal.      Acanthocytes 11/04/2021 Slight*     Elliptocytes 11/04/2021 Slight*     Polychromasia 11/04/2021 Slight*     Target Cells 11/04/2021 Slight*     RBC Morphology 11/04/2021 Confirmed RBC Indices      Amphetamines Urine 11/04/2021 Screen Negative      Benzodiazepines Urine 11/04/2021 Screen Negative      Opiates Urine 11/04/2021 Screen Negative      PCP Urine 11/04/2021 Screen Negative      Cannabinoids Urine 11/04/2021  Screen Negative      Barbiturates Urine 11/04/2021 Screen Negative      Cocaine Urine 11/04/2021 Screen Negative      Methadone Urine 11/04/2021 Screen Negative      Oxycodone Urine 11/04/2021 Screen Negative      Creatinine Urine mg/dL 11/04/2021 313      SARS CoV2 PCR 11/05/2021 Negative          Total time spent for face to face visit, reviewing labs/imaging studies, counseling and coordination of care was: 30 Minutes spent on the date of the encounter doing chart review, review of outside records, review of test results, interpretation of tests, patient visit and documentation       This note was dictated using voice recognition software.  Any grammatical or context distortions are unintentional and inherent to the software.    Orders Placed This Encounter   Procedures     Lamotrigine Level      New Prescriptions    LEVETIRACETAM (KEPPRA) 250 MG TABLET    Take 3 tablets twice a day for 5 days, then 2 tablets twice a day for 5 days, then 1 tablet twice a day for 5 days, then 1 tablet once a day for 5 days then stop taking this medicine     Modified Medications    Modified Medication Previous Medication    LAMOTRIGINE (LAMICTAL) 150 MG TABLET lamoTRIgine (LAMICTAL) 100 MG tablet       Take 1 tablet (150 mg) by mouth 2 times daily    Take 1 tablet twice a day for 1 week and afterwards 1-1/2 tablet twice daily                   Again, thank you for allowing me to participate in the care of your patient.        Sincerely,        Calixto Busby MD

## 2022-01-14 NOTE — PROGRESS NOTES
NEUROLOGY FOLLOW UP VISIT  NOTE       [facility] NEUROLOGY [place]  [address] [place] [address]  Tel: [phone]  Fax: [phone]  [email or web address]     Norman RASTA Watts ANA 1990, MRN [id number]  PCP: Ivania Cruz  Date: 2022      ASSESSMENT & PLAN     Visit Diagnosis  Frontal lobe epilepsy (H)     Frontal lobe epilepsy  32-year-old female with history of depression, beta thalassemia trait, anxiety and depression who is been followed in our clinic for frontal lobe epilepsy.  Previously she was on lamotrigine but after her admission to [facility] for breakthrough seizure due to noncompliance for some reason she was started on lamotrigine and Keppra.  As she was well managed on lamotrigine monotherapy, dose was ramped up to 150 mg twice daily.  I have recommended:    1.  Continue lamotrigine 150 mg twice daily.  I sent a new prescription to patient's pharmacy  2.  Taper Keppra in 20 days.  I sent in a new prescription for Keppra 250 mg with directions to gradually decrease the dose in 20 days  3.  Check lamotrigine level  4.  Follow-up will be in 3 months    Thank you again for this referral, please feel free to contact me if you have any questions.    Calixto Busby MD  [facility]  (Former[facility], [facility]     HISTORY OF PRESENT ILLNESS     Patient is a 32-year-old female with history of depression, beta thalassemia trait, anxiety, depression who is being followed in our clinic for frontal lobe epilepsy.  She was last seen on 2021 and was on both lamotrigine and Keppra.  Previously she had stopped taking lamotrigine due to her pregnancy and was started back at [facility] both on Keppra and lamotrigine.  I felt she can be managed on lamotrigine monotherapy and I had told her to gradually increase the dose of lamotrigine to 150 mg twice daily.  She was told not to make any change in the dose  of Keppra.  She had blood levels checked during her visit and comprehensive metabolic panel was normal, lamotrigine level was 2.2 and Keppra level was therapeutic at 21.5.  Since her last visit she reports she was able to gradually increase the dose of Lamictal to 150 mg twice daily and continues to take Keppra.  She had not experienced any side effects    She was initially seen in our clinic on 5/24/2017 when she reported having seizures since 2012.  Her  describes tight stiffening of her extremities and at times she would bite her lips or cheek or tongue.  There is no history of any urinary incontinence.  MRI of the brain in the past did not show any abnormality.  EEG showed some asymmetry but no clear epileptiform activity.  Initially she was started on Keppra but it caused depression and suicidal ideation and was switched to lamotrigine.  She had a breakthrough seizure in 2019 when she was admitted to [facility] where EEG showed frontal intermittent rhythmic delta activity and occasional independent sharp discharges maximum at F7 and F8.  She had stopped taking lamotrigine before that admission and for some reason both Keppra and lamotrigine were restarted.     PROBLEM LIST   Patient Active Problem List   Diagnosis Code     Frontal lobe epilepsy (Dys III Bifrontal, independent) G40.802     Chronic viral hepatitis B without delta agent and without coma (H) B18.1     Anxiety disorder, unspecified type F41.9     Severe single current episode of major depressive disorder, with psychotic features (H) F32.3     Beta thalassemia trait D56.3     Language barrier, cultural differences Z60.3     H/O transfusion of packed red blood cells Z92.89     Supervision of high risk pregnancy, antepartum O09.90     Choroid plexus cyst of fetus, single or unspecified fetus O35.0XX0     Catatonia F06.1         PAST MEDICAL & SURGICAL HISTORY     Past Medical History:   Patient  has a past medical history of Depression,  "History of blood transfusion, and Seizure disorder (H).    Surgical History:  She  has a past surgical history that includes no history of surgery.     SOCIAL HISTORY     Reviewed, and she  reports that she has never smoked. She has never used smokeless tobacco. She reports that she does not drink alcohol and does not use drugs.     FAMILY HISTORY     Reviewed, and family history is not on file.     ALLERGIES     Allergies   Allergen Reactions     Keppra [Levetiracetam] Other (See Comments)     Suicidal ideation         REVIEW OF SYSTEMS     A 12 point review of system was performed and was negative except as outlined in the history of present illness.     HOME MEDICATIONS     Current Outpatient Rx   Medication Sig Dispense Refill     lamoTRIgine (LAMICTAL) 150 MG tablet Take 1 tablet (150 mg) by mouth 2 times daily 60 tablet 11     levETIRAcetam (KEPPRA) 250 MG tablet Take 3 tablets twice a day for 5 days, then 2 tablets twice a day for 5 days, then 1 tablet twice a day for 5 days, then 1 tablet once a day for 5 days then stop taking this medicine 65 tablet 0     folic acid (FOLVITE) 400 MCG tablet Take 2 tablets (800 mcg) by mouth daily (Patient not taking: Reported on 9/3/2021) 180 tablet 3     hydrOXYzine (ATARAX) 25 MG tablet Take 1 tablet (25 mg) by mouth 3 times daily as needed for anxiety 30 tablet 1     levETIRAcetam (KEPPRA) 1000 MG tablet Take 1,000 mg by mouth daily       metoprolol succinate ER (TOPROL-XL) 50 MG 24 hr tablet Take 50 mg by mouth daily       vitamin D2 (ERGOCALCIFEROL) 60302 units (1250 mcg) capsule Take 50,000 Units by mouth once a week           PHYSICAL EXAM     Vital signs  BP 94/62 (BP Location: Left arm, Patient Position: Sitting)   Pulse 89   Ht 1.549 m (5' 1\")   Wt 45.4 kg (100 lb)   BMI 18.89 kg/m      Weight:   100 lbs 0 oz    Patient is alert and oriented x4 in no acute distress. Vital signs were reviewed and are documented in electronic medical record. Neck was supple, no " carotid bruits, thyromegaly, JVD, or lymphadenopathy was noted.   NEUROLOGY EXAM:    Patient s speech was normal with no aphasia or dysarthria. Mentation, and affect were also normal.     Funduscopic exam was normal, with normal cup to disc ratio. Cranial nerves II -XII were intact.     Patient had normal mass, tone and motor strength was 5/5 in all extremities without pronator drift.     Sensation was intact to light touch, pinprick, and vibratory sensation.     Reflexes were 1+ symmetrical with downgoing toes.     No dysmetria noted on FNF or HKS. Romberg was negative.    Gait testing was normal. Able to walk on toes/heels. Tandem walk normal.     PERTINENT DIAGNOSTIC STUDIES     Following studies were reviewed:     MRI BRAIN 4/28/2017  1.  No acute infarcts, mass lesions or hemorrhage.  2.  No structural abnormality or migrational anomaly identified     EEG 5/24/2017  This is a mildly abnormal EEG due to some background asymmetry. This is a nonspecific finding. There are no epileptiform findings on today s study.     EEG 1/2/2019   This is an abnormal awake, drowsy and asleep EEG due to the presence of  1.  Moderate generalized background slowing, that mildly improved over the course of the recording  2.  Frontal intermittent rhythmic delta activity (FIRDA)  3.  Occasional independent sharp wave discharges, maximum F7/F8 derivations, with a white fields during drowsiness and sleep that alternated and laterality.  No clinical events or seizures were captured during the recording of the study     PERTINENT LABS  Following labs were reviewed:  Lab on 12/14/2021   Component Date Value     Levetiracetam 12/14/2021 21.5      Lamotrigine 12/14/2021 2.2*     Sodium 12/14/2021 139      Potassium 12/14/2021 3.9      Chloride 12/14/2021 105      Carbon Dioxide (CO2) 12/14/2021 25      Anion Gap 12/14/2021 9      Urea Nitrogen 12/14/2021 11      Creatinine 12/14/2021 0.75      Calcium 12/14/2021 9.8      Glucose 12/14/2021  85      Alkaline Phosphatase 12/14/2021 64      AST 12/14/2021 25      ALT 12/14/2021 25      Protein Total 12/14/2021 8.2*     Albumin 12/14/2021 4.5      Bilirubin Total 12/14/2021 1.3*     GFR Estimate 12/14/2021 >90    Admission on 11/04/2021, Discharged on 11/05/2021   Component Date Value     Ventricular Rate 11/04/2021 110      Atrial Rate 11/04/2021 110      MS Interval 11/04/2021 186      QRS Duration 11/04/2021 78      QT 11/04/2021 336      QTc 11/04/2021 454      P Axis 11/04/2021 -18      R AXIS 11/04/2021 -23      T [place] 11/04/2021 13      Interpretation ECG 11/04/2021                      Value:Sinus tachycardia  Inferior infarct , age undetermined  Abnormal ECG  No previous ECGs available  Confirmed by SEE ED PROVIDER NOTE FOR, ECG INTERPRETATION ([id number]),  MARIBEL LARSON ([id number]) on 11/5/2021 10:31:20 AM       Alcohol, Blood 11/04/2021 <10      Sodium 11/04/2021 141      Potassium 11/04/2021 2.9*     Chloride 11/04/2021 105      Carbon Dioxide (CO2) 11/04/2021 24      Anion Gap 11/04/2021 12      Urea Nitrogen 11/04/2021 7*     Creatinine 11/04/2021 0.75      Calcium 11/04/2021 9.6      Glucose 11/04/2021 95      GFR Estimate 11/04/2021 >90      Bilirubin Total 11/04/2021 1.4*     Bilirubin Direct 11/04/2021 0.4      Protein Total 11/04/2021 8.5*     Albumin 11/04/2021 4.6      Alkaline Phosphatase 11/04/2021 57      AST 11/04/2021 37      ALT 11/04/2021 26      Magnesium 11/04/2021 2.0      Ammonia 11/04/2021 23      hCG Serum Qualitative 11/04/2021 Negative      CRP 11/04/2021 0.1      Procalcitonin 11/04/2021 <0.02      Color Urine 11/04/2021 Yellow      Appearance Urine 11/04/2021 Clear      Glucose Urine 11/04/2021 Negative      Bilirubin Urine 11/04/2021 Negative      Ketones Urine 11/04/2021 40 *     Specific Gravity Urine 11/04/2021 1.025      Blood Urine 11/04/2021 Negative      pH Urine 11/04/2021 6.0      Protein Albumin Urine 11/04/2021 20 *     Urobilinogen Urine 11/04/2021 <2.0       Nitrite Urine 11/04/2021 Negative      Leukocyte Esterase Urine 11/04/2021 Negative      Mucus Urine 11/04/2021 Present*     RBC Urine 11/04/2021 2      WBC Urine 11/04/2021 3      Squamous Epithelials Uri* 11/04/2021 2*     Levetiracetam 11/04/2021 9.9      Lamotrigine 11/04/2021 1.7*     TSH 11/04/2021 2.20      WBC Count 11/04/2021 12.1*     RBC Count 11/04/2021 5.55*     Hemoglobin 11/04/2021 10.7*     Hematocrit 11/04/2021 33.7*     MCV 11/04/2021 61*     MCH 11/04/2021 19.3*     MCHC 11/04/2021 31.8      RDW 11/04/2021 17.0*     Platelet Count 11/04/2021 195      % Neutrophils 11/04/2021 71      % Lymphocytes 11/04/2021 20      % Monocytes 11/04/2021 7      % Eosinophils 11/04/2021 2      % Basophils 11/04/2021 0      % Immature Granulocytes 11/04/2021 0      NRBCs per 100 WBC 11/04/2021 0      Absolute Neutrophils 11/04/2021 8.7*     Absolute Lymphocytes 11/04/2021 2.4      Absolute Monocytes 11/04/2021 0.8      Absolute Eosinophils 11/04/2021 0.2      Absolute Basophils 11/04/2021 0.1      Absolute Immature Granul* 11/04/2021 0.0      Absolute NRBCs 11/04/2021 0.0      Hold Specimen 11/04/2021 [facility]      Hold Specimen 11/04/2021 [facility]      Platelet Assessment 11/04/2021 Automated Count Confirmed. Platelet morphology is normal.      Acanthocytes 11/04/2021 Slight*     Elliptocytes 11/04/2021 Slight*     Polychromasia 11/04/2021 Slight*     Target Cells 11/04/2021 Slight*     RBC Morphology 11/04/2021 Confirmed RBC Indices      Amphetamines Urine 11/04/2021 Screen Negative      Benzodiazepines Urine 11/04/2021 Screen Negative      Opiates Urine 11/04/2021 Screen Negative      PCP Urine 11/04/2021 Screen Negative      Cannabinoids Urine 11/04/2021 Screen Negative      Barbiturates Urine 11/04/2021 Screen Negative      Cocaine Urine 11/04/2021 Screen Negative      Methadone Urine 11/04/2021 Screen Negative      Oxycodone Urine 11/04/2021 Screen Negative      Creatinine Urine mg/dL 11/04/2021 313      SARS CoV2  PCR 11/05/2021 Negative          Total time spent for face to face visit, reviewing labs/imaging studies, counseling and coordination of care was: 30 Minutes spent on the date of the encounter doing chart review, review of outside records, review of test results, interpretation of tests, patient visit and documentation     This note was dictated using voice recognition software.  Any grammatical or context distortions are unintentional and inherent to the software.    Orders Placed This Encounter   Procedures     Lamotrigine Level      New Prescriptions    LEVETIRACETAM (KEPPRA) 250 MG TABLET    Take 3 tablets twice a day for 5 days, then 2 tablets twice a day for 5 days, then 1 tablet twice a day for 5 days, then 1 tablet once a day for 5 days then stop taking this medicine     Modified Medications    Modified Medication Previous Medication    LAMOTRIGINE (LAMICTAL) 150 MG TABLET lamoTRIgine (LAMICTAL) 100 MG tablet       Take 1 tablet (150 mg) by mouth 2 times daily    Take 1 tablet twice a day for 1 week and afterwards 1-1/2 tablet twice daily

## 2022-01-14 NOTE — NURSING NOTE
Chief Complaint   Patient presents with     Seizures     1 month follow up - Taking Keppra 1000mg qhs and Lamictal 150mg BID      Cintia Corrales CMA on 1/14/2022 at 9:42 AM

## 2022-01-20 ENCOUNTER — LAB (OUTPATIENT)
Dept: LAB | Facility: HOSPITAL | Age: 32
End: 2022-01-20
Payer: COMMERCIAL

## 2022-01-20 DIAGNOSIS — G40.802 FRONTAL LOBE EPILEPSY (H): ICD-10-CM

## 2022-01-20 PROCEDURE — 36415 COLL VENOUS BLD VENIPUNCTURE: CPT

## 2022-01-20 PROCEDURE — 80175 DRUG SCREEN QUAN LAMOTRIGINE: CPT

## 2022-01-22 LAB — LAMOTRIGINE SERPL-MCNC: 12.3 UG/ML

## 2022-03-11 NOTE — TELEPHONE ENCOUNTER
[facility] Family Medicine phone call message- general phone call:    Reason for call: She would like a call back from  re getting a referral for skilled nursing.    Return call needed: Yes    OK to leave a message on voice mail? Yes    Primary language: Jennifer      needed? Yes    Call taken on May 9, 2018 at 2:19 PM by Richard Oro    
Referral made [facility] councelling was called and updated. Please refer to Orders Only note for further detail. Thanks    Elvin Brizuela  Family Medicine Resident PGY3    
Routed to Dr. Brizuela. /TINA Mon    
11-Mar-2022 15:49

## 2022-04-14 ENCOUNTER — OFFICE VISIT (OUTPATIENT)
Dept: NEUROLOGY | Facility: CLINIC | Age: 32
End: 2022-04-14
Payer: COMMERCIAL

## 2022-04-14 ENCOUNTER — LAB (OUTPATIENT)
Dept: LAB | Facility: HOSPITAL | Age: 32
End: 2022-04-14
Payer: COMMERCIAL

## 2022-04-14 VITALS
BODY MASS INDEX: 18.88 KG/M2 | DIASTOLIC BLOOD PRESSURE: 63 MMHG | HEART RATE: 86 BPM | SYSTOLIC BLOOD PRESSURE: 101 MMHG | WEIGHT: 100 LBS | HEIGHT: 61 IN

## 2022-04-14 DIAGNOSIS — R56.9 SEIZURES (H): ICD-10-CM

## 2022-04-14 DIAGNOSIS — G40.802 FRONTAL LOBE EPILEPSY (H): ICD-10-CM

## 2022-04-14 DIAGNOSIS — G40.802 FRONTAL LOBE EPILEPSY (H): Primary | ICD-10-CM

## 2022-04-14 PROCEDURE — 80175 DRUG SCREEN QUAN LAMOTRIGINE: CPT

## 2022-04-14 PROCEDURE — 36415 COLL VENOUS BLD VENIPUNCTURE: CPT

## 2022-04-14 PROCEDURE — 99214 OFFICE O/P EST MOD 30 MIN: CPT | Performed by: PSYCHIATRY & NEUROLOGY

## 2022-04-14 RX ORDER — FOLIC ACID 1 MG/1
1 TABLET ORAL 2 TIMES DAILY
Qty: 60 TABLET | Refills: 11 | Status: SHIPPED | OUTPATIENT
Start: 2022-04-14 | End: 2022-12-23

## 2022-04-14 NOTE — NURSING NOTE
Chief Complaint   Patient presents with     Seizures     Follow up- Patient reports she is taking Lamictal 150mg BID      Cintia Corrales CMA on 4/14/2022 at 10:53 AM

## 2022-04-14 NOTE — PROGRESS NOTES
NEUROLOGY FOLLOW UP VISIT  NOTE       [facility] NEUROLOGY [place]  [address] [place] [address]  Tel: [phone]  Fax: [phone]  [email or web address]     Norman Watts DOB 1990, MRN [id number]  PCP: Ivania Cruz  Date: 2022      ASSESSMENT & PLAN     Visit Diagnosis  Frontal lobe epilepsy (H)     Frontal lobe epilepsy (Dysrhythmia grade III bifrontal, independent)  32-year-old female with history of depression, anxiety, beta thalassemia trait, who is been followed in our clinic for frontal lobe epilepsy.  Previously she was on lamotrigine monotherapy but after her admission to [facility] for unclear reason Keppra was added.  She was told to wean her off Keppra that was done in the recent past and currently she is on Lamictal monotherapy.  Her level checked during previous visit was 12.3.  I have recommended:    1.  Continue lamotrigine 150 mg twice daily.  I refilled her prescription, gave her 30-day supply with 11 refills  2.  Check lamotrigine level  3.  Patient was counseled about the risk of congenital malformation in women of childbearing age and I have recommended taking folic acid 1 mg twice daily  4.  Follow-up will be in 1 year, earlier if there is any problem    Thank you again for this referral, please feel free to contact me if you have any questions.    Calixto Busby MD  [facility]  ([facility], [facility]     HISTORY OF PRESENT ILLNESS     Patient is a 32-year-old female with history of depression, beta thalassemia trait, anxiety, depression who is being followed in our clinic for frontal lobe epilepsy.  In the recent past she was admitted to [facility] for breakthrough seizures due to noncompliance but for unclear reason Keppra was added.  Previously she was well managed on lamotrigine monotherapy and I did not see any reason to continue her on 2 anticonvulsants.  I had told  her to continue lamotrigine 150 mg twice daily and to gradually taper herself off Keppra in 20 days.  Lamotrigine level was checked during last visit and was therapeutic at 12.3.  Since her last visit she reports she was able to wean her off Keppra without any problem.  She is on Lamictal 150 mg twice daily monotherapy and has not experienced any seizures.  She denies unexplained loss of consciousness, automatism.  She does have anxiety and at times feels anxious.    She was initially seen in our clinic on 5/24/2017 when she reported having seizures since 2012.  Her  describes tight stiffening of her extremities and at times she would bite her lips or cheek or tongue.  There is no history of any urinary incontinence.  MRI of the brain in the past did not show any abnormality.  EEG showed some asymmetry but no clear epileptiform activity.  Initially she was started on Keppra but it caused depression and suicidal ideation and was switched to lamotrigine.  She had a breakthrough seizure in 2019 when she was admitted to [facility] where EEG showed frontal intermittent rhythmic delta activity and occasional independent sharp discharges maximum at F7 and F8.  She had stopped taking lamotrigine before that admission and for some reason both Keppra and lamotrigine were restarted.  As noted above I did not see any reason to continue her on dual anticonvulsants and was told to taper herself off Keppra and to continue on Lamictal monotherapy.     PROBLEM LIST   Patient Active Problem List   Diagnosis Code     Frontal lobe epilepsy (Dys III Bifrontal, independent) G40.802     Chronic viral hepatitis B without delta agent and without coma (H) B18.1     Anxiety disorder, unspecified type F41.9     Severe single current episode of major depressive disorder, with psychotic features (H) F32.3     Beta thalassemia trait D56.3     Language barrier, cultural differences Z60.3     H/O transfusion of packed red blood cells  "Z92.89     Supervision of high risk pregnancy, antepartum O09.90     Choroid plexus cyst of fetus, single or unspecified fetus O35.0XX0     Catatonia F06.1         PAST MEDICAL & SURGICAL HISTORY     Past Medical History:   Patient  has a past medical history of Depression, History of blood transfusion, and Seizure disorder (H).    Surgical History:  She  has a past surgical history that includes no history of surgery.     SOCIAL HISTORY     Reviewed, and she  reports that she has never smoked. She has never used smokeless tobacco. She reports that she does not drink alcohol and does not use drugs.     FAMILY HISTORY     Reviewed, and family history is not on file.     ALLERGIES     Allergies   Allergen Reactions     Keppra [Levetiracetam] Other (See Comments)     Suicidal ideation         REVIEW OF SYSTEMS     A 12 point review of system was performed and was negative except as outlined in the history of present illness.     HOME MEDICATIONS     Current Outpatient Rx   Medication Sig Dispense Refill     folic acid (FOLVITE) 1 MG tablet Take 1 tablet (1 mg) by mouth 2 times daily 60 tablet 11     lamoTRIgine (LAMICTAL) 150 MG tablet Take 1 tablet (150 mg) by mouth 2 times daily 60 tablet 11     metoprolol succinate ER (TOPROL-XL) 50 MG 24 hr tablet Take 50 mg by mouth daily       hydrOXYzine (ATARAX) 25 MG tablet Take 1 tablet (25 mg) by mouth 3 times daily as needed for anxiety (Patient not taking: Reported on 4/14/2022) 30 tablet 1         PHYSICAL EXAM     Vital signs  /63 (BP Location: Left arm, Patient Position: Sitting)   Pulse 86   Ht 1.549 m (5' 1\")   Wt 45.4 kg (100 lb)   BMI 18.89 kg/m      Weight:   100 lbs 0 oz    Patient is alert and oriented x4 in no acute distress. Vital signs were reviewed and are documented in electronic medical record. Neck was supple, no carotid bruits, thyromegaly, JVD, or lymphadenopathy was noted.   NEUROLOGY EXAM:    Patient s speech was normal with no aphasia or " dysarthria. Mentation, and affect were also normal.     Funduscopic exam was normal, with normal cup to disc ratio. Cranial nerves II -XII were intact.     Patient had normal mass, tone and motor strength was 5/5 in all extremities without pronator drift.     Sensation was intact to light touch, pinprick, and vibratory sensation.     Reflexes were 1+ symmetrical with downgoing toes.     No dysmetria noted on FNF or HKS. Romberg was negative.    Gait testing was normal. Able to walk on toes/heels. Tandem walk normal.     PERTINENT DIAGNOSTIC STUDIES     Following studies were reviewed:     MRI BRAIN 4/28/2017  1.  No acute infarcts, mass lesions or hemorrhage.  2.  No structural abnormality or migrational anomaly identified     EEG 5/24/2017  This is a mildly abnormal EEG due to some background asymmetry. This is a nonspecific finding. There are no epileptiform findings on today s study.     EEG 1/2/2019   This is an abnormal awake, drowsy and asleep EEG due to the presence of  1.  Moderate generalized background slowing, that mildly improved over the course of the recording  2.  Frontal intermittent rhythmic delta activity (FIRDA)  3.  Occasional independent sharp wave discharges, maximum F7/F8 derivations, with a white fields during drowsiness and sleep that alternated and laterality.  No clinical events or seizures were captured during the recording of the study     PERTINENT LABS  Following labs were reviewed:  Lab on 01/20/2022   Component Date Value     Lamotrigine 01/20/2022 12.3          Total time spent for face to face visit, reviewing labs/imaging studies, counseling and coordination of care was: 30 Minutes spent on the date of the encounter doing chart review, review of outside records, review of test results, interpretation of tests, patient visit and documentation     This note was dictated using voice recognition software.  Any grammatical or context distortions are unintentional and inherent to the  software.    Orders Placed This Encounter   Procedures     Lamotrigine Level      New Prescriptions    No medications on file     Modified Medications    Modified Medication Previous Medication    FOLIC ACID (FOLVITE) 1 MG TABLET folic acid (FOLVITE) 400 MCG tablet       Take 1 tablet (1 mg) by mouth 2 times daily    Take 2 tablets (800 mcg) by mouth daily

## 2022-04-14 NOTE — LETTER
2022         RE: Norman Watts  [address]  [address]  [place] [address]        Dear Colleague,    Thank you for referring your patient, Norman Watts, to the [facility]. Please see a copy of my visit note below.    NEUROLOGY FOLLOW UP VISIT  NOTE       [facility]  [address] [place] [address]  Tel: [phone]  Fax: [phone]  [email or web address]     Norman Watts,  1990, MRN [id number]  PCP: Ivania Cruz  Date: 2022      ASSESSMENT & PLAN     Visit Diagnosis  1. 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  Previously she was on lamotrigine monotherapy but after her admission to [facility] for unclear reason Keppra was added.  She was told to wean her off Keppra that was done in the recent past and currently she is on Lamictal monotherapy.  Her level checked during previous visit was 12.3.  I have recommended:    1.  Continue lamotrigine 150 mg twice daily.  I refilled her prescription, gave her 30-day supply with 11 refills  2.  Check lamotrigine level  3.  Patient was counseled about the risk of congenital malformation in women of childbearing age and I have recommended taking folic acid 1 mg twice daily  4.  Follow-up will be in 1 year, earlier if there is any problem    Thank you again for this referral, please feel free to contact me if you have any questions.    Calixto Busby MD  [facility]  (Former[facility], [facility]     HISTORY OF PRESENT ILLNESS     Patient is a 32-year-old female with history of depression, beta thalassemia trait, anxiety, depression who is being followed in our clinic for frontal lobe epilepsy.  In the recent past she  was admitted to [facility] for breakthrough seizures due to noncompliance but for unclear reason Keppra was added.  Previously she was well managed on lamotrigine monotherapy and I did not see any reason to continue her on 2 anticonvulsants.  I had told her to continue lamotrigine 150 mg twice daily and to gradually taper herself off Keppra in 20 days.  Lamotrigine level was checked during last visit and was therapeutic at 12.3.  Since her last visit she reports she was able to wean her off Keppra without any problem.  She is on Lamictal 150 mg twice daily monotherapy and has not experienced any seizures.  She denies unexplained loss of consciousness, automatism.  She does have anxiety and at times feels anxious.    She was initially seen in our clinic on 5/24/2017 when she reported having seizures since 2012.  Her  describes tight stiffening of her extremities and at times she would bite her lips or cheek or tongue.  There is no history of any urinary incontinence.  MRI of the brain in the past did not show any abnormality.  EEG showed some asymmetry but no clear epileptiform activity.  Initially she was started on Keppra but it caused depression and suicidal ideation and was switched to lamotrigine.  She had a breakthrough seizure in 2019 when she was admitted to [facility] where EEG showed frontal intermittent rhythmic delta activity and occasional independent sharp discharges maximum at F7 and F8.  She had stopped taking lamotrigine before that admission and for some reason both Keppra and lamotrigine were restarted.  As noted above I did not see any reason to continue her on dual anticonvulsants and was told to taper herself off Keppra and to continue on Lamictal monotherapy.     PROBLEM LIST   Patient Active Problem List   Diagnosis Code     Frontal lobe epilepsy (Dys III Bifrontal, independent) G40.802     Chronic viral hepatitis B without delta agent and without coma (H) B18.1     Anxiety  "disorder, unspecified type F41.9     Severe single current episode of major depressive disorder, with psychotic features (H) F32.3     Beta thalassemia trait D56.3     Language barrier, cultural differences Z60.3     H/O transfusion of packed red blood cells Z92.89     Supervision of high risk pregnancy, antepartum O09.90     Choroid plexus cyst of fetus, single or unspecified fetus O35.0XX0     Catatonia F06.1         PAST MEDICAL & SURGICAL HISTORY     Past Medical History:   Patient  has a past medical history of Depression, History of blood transfusion, and Seizure disorder (H).    Surgical History:  She  has a past surgical history that includes no history of surgery.     SOCIAL HISTORY     Reviewed, and she  reports that she has never smoked. She has never used smokeless tobacco. She reports that she does not drink alcohol and does not use drugs.     FAMILY HISTORY     Reviewed, and family history is not on file.     ALLERGIES     Allergies   Allergen Reactions     Keppra [Levetiracetam] Other (See Comments)     Suicidal ideation         REVIEW OF SYSTEMS     A 12 point review of system was performed and was negative except as outlined in the history of present illness.     HOME MEDICATIONS     Current Outpatient Rx   Medication Sig Dispense Refill     folic acid (FOLVITE) 1 MG tablet Take 1 tablet (1 mg) by mouth 2 times daily 60 tablet 11     lamoTRIgine (LAMICTAL) 150 MG tablet Take 1 tablet (150 mg) by mouth 2 times daily 60 tablet 11     metoprolol succinate ER (TOPROL-XL) 50 MG 24 hr tablet Take 50 mg by mouth daily       hydrOXYzine (ATARAX) 25 MG tablet Take 1 tablet (25 mg) by mouth 3 times daily as needed for anxiety (Patient not taking: Reported on 4/14/2022) 30 tablet 1         PHYSICAL EXAM     Vital signs  /63 (BP Location: Left arm, Patient Position: Sitting)   Pulse 86   Ht 1.549 m (5' 1\")   Wt 45.4 kg (100 lb)   BMI 18.89 kg/m      Weight:   100 lbs 0 oz    Patient is alert and " oriented x4 in no acute distress. Vital signs were reviewed and are documented in electronic medical record. Neck was supple, no carotid bruits, thyromegaly, JVD, or lymphadenopathy was noted.   NEUROLOGY EXAM:    Patient s speech was normal with no aphasia or dysarthria. Mentation, and affect were also normal.     Funduscopic exam was normal, with normal cup to disc ratio. Cranial nerves II -XII were intact.     Patient had normal mass, tone and motor strength was 5/5 in all extremities without pronator drift.     Sensation was intact to light touch, pinprick, and vibratory sensation.     Reflexes were 1+ symmetrical with downgoing toes.     No dysmetria noted on FNF or HKS. Romberg was negative.    Gait testing was normal. Able to walk on toes/heels. Tandem walk normal.     PERTINENT DIAGNOSTIC STUDIES     Following studies were reviewed:     MRI BRAIN 4/28/2017  1.  No acute infarcts, mass lesions or hemorrhage.  2.  No structural abnormality or migrational anomaly identified     EEG 5/24/2017  This is a mildly abnormal EEG due to some background asymmetry. This is a nonspecific finding. There are no epileptiform findings on today s study.     EEG 1/2/2019   This is an abnormal awake, drowsy and asleep EEG due to the presence of  1.  Moderate generalized background slowing, that mildly improved over the course of the recording  2.  Frontal intermittent rhythmic delta activity (FIRDA)  3.  Occasional independent sharp wave discharges, maximum F7/F8 derivations, with a white fields during drowsiness and sleep that alternated and laterality.  No clinical events or seizures were captured during the recording of the study     PERTINENT LABS  Following labs were reviewed:  Lab on 01/20/2022   Component Date Value     Lamotrigine 01/20/2022 12.3          Total time spent for face to face visit, reviewing labs/imaging studies, counseling and coordination of care was: 30 Minutes spent on the date of the encounter doing  chart review, review of outside records, review of test results, interpretation of tests, patient visit and documentation       This note was dictated using voice recognition software.  Any grammatical or context distortions are unintentional and inherent to the software.    Orders Placed This Encounter   Procedures     Lamotrigine Level      New Prescriptions    No medications on file     Modified Medications    Modified Medication Previous Medication    FOLIC ACID (FOLVITE) 1 MG TABLET folic acid (FOLVITE) 400 MCG tablet       Take 1 tablet (1 mg) by mouth 2 times daily    Take 2 tablets (800 mcg) by mouth daily                     Again, thank you for allowing me to participate in the care of your patient.        Sincerely,        Calixto Busby MD

## 2022-04-15 LAB — LAMOTRIGINE SERPL-MCNC: 11.8 UG/ML

## 2022-05-21 ENCOUNTER — HEALTH MAINTENANCE LETTER (OUTPATIENT)
Age: 32
End: 2022-05-21

## 2022-09-11 ENCOUNTER — HEALTH MAINTENANCE LETTER (OUTPATIENT)
Age: 32
End: 2022-09-11

## 2022-12-23 ENCOUNTER — OFFICE VISIT (OUTPATIENT)
Dept: FAMILY MEDICINE | Facility: CLINIC | Age: 32
End: 2022-12-23
Payer: COMMERCIAL

## 2022-12-23 VITALS
HEART RATE: 77 BPM | WEIGHT: 101.4 LBS | DIASTOLIC BLOOD PRESSURE: 57 MMHG | RESPIRATION RATE: 16 BRPM | SYSTOLIC BLOOD PRESSURE: 90 MMHG | TEMPERATURE: 97.5 F | OXYGEN SATURATION: 96 % | BODY MASS INDEX: 19.16 KG/M2

## 2022-12-23 DIAGNOSIS — D50.8 OTHER IRON DEFICIENCY ANEMIA: ICD-10-CM

## 2022-12-23 DIAGNOSIS — G40.802 FRONTAL LOBE EPILEPSY (H): Primary | ICD-10-CM

## 2022-12-23 DIAGNOSIS — R56.9 SEIZURES (H): ICD-10-CM

## 2022-12-23 LAB
ALBUMIN SERPL BCG-MCNC: 4.9 G/DL (ref 3.5–5.2)
ALP SERPL-CCNC: 65 U/L (ref 35–104)
ALT SERPL W P-5'-P-CCNC: 11 U/L (ref 10–35)
ANION GAP SERPL CALCULATED.3IONS-SCNC: 12 MMOL/L (ref 7–15)
AST SERPL W P-5'-P-CCNC: 21 U/L (ref 10–35)
BASOPHILS # BLD AUTO: 0.1 10E3/UL (ref 0–0.2)
BASOPHILS NFR BLD AUTO: 1 %
BILIRUB SERPL-MCNC: 1 MG/DL
BUN SERPL-MCNC: 10.8 MG/DL (ref 6–20)
CALCIUM SERPL-MCNC: 9.4 MG/DL (ref 8.6–10)
CHLORIDE SERPL-SCNC: 103 MMOL/L (ref 98–107)
CREAT SERPL-MCNC: 0.76 MG/DL (ref 0.51–0.95)
DEPRECATED HCO3 PLAS-SCNC: 24 MMOL/L (ref 22–29)
EOSINOPHIL # BLD AUTO: 0.2 10E3/UL (ref 0–0.7)
EOSINOPHIL NFR BLD AUTO: 2 %
ERYTHROCYTE [DISTWIDTH] IN BLOOD BY AUTOMATED COUNT: 15.9 % (ref 10–15)
FERRITIN SERPL-MCNC: 58 NG/ML (ref 6–175)
GFR SERPL CREATININE-BSD FRML MDRD: >90 ML/MIN/1.73M2
GLUCOSE SERPL-MCNC: 98 MG/DL (ref 70–99)
HCT VFR BLD AUTO: 35.7 % (ref 35–47)
HGB BLD-MCNC: 11.1 G/DL (ref 11.7–15.7)
IMM GRANULOCYTES # BLD: 0 10E3/UL
IMM GRANULOCYTES NFR BLD: 0 %
IRON BINDING CAPACITY (ROCHE): 254 UG/DL (ref 240–430)
IRON SATN MFR SERPL: 48 % (ref 15–46)
IRON SERPL-MCNC: 121 UG/DL (ref 37–145)
LYMPHOCYTES # BLD AUTO: 1.6 10E3/UL (ref 0.8–5.3)
LYMPHOCYTES NFR BLD AUTO: 16 %
MCH RBC QN AUTO: 19.6 PG (ref 26.5–33)
MCHC RBC AUTO-ENTMCNC: 31.1 G/DL (ref 31.5–36.5)
MCV RBC AUTO: 63 FL (ref 78–100)
MONOCYTES # BLD AUTO: 0.4 10E3/UL (ref 0–1.3)
MONOCYTES NFR BLD AUTO: 4 %
NEUTROPHILS # BLD AUTO: 7.7 10E3/UL (ref 1.6–8.3)
NEUTROPHILS NFR BLD AUTO: 77 %
NRBC # BLD AUTO: 0 10E3/UL
NRBC BLD AUTO-RTO: 0 /100
PLAT MORPH BLD: ABNORMAL
PLATELET # BLD AUTO: 216 10E3/UL (ref 150–450)
POLYCHROMASIA BLD QL SMEAR: SLIGHT
POTASSIUM SERPL-SCNC: 4 MMOL/L (ref 3.4–5.3)
PROT SERPL-MCNC: 8 G/DL (ref 6.4–8.3)
RBC # BLD AUTO: 5.66 10E6/UL (ref 3.8–5.2)
RBC MORPH BLD: ABNORMAL
SODIUM SERPL-SCNC: 139 MMOL/L (ref 136–145)
TARGETS BLD QL SMEAR: ABNORMAL
WBC # BLD AUTO: 10 10E3/UL (ref 4–11)

## 2022-12-23 PROCEDURE — 83540 ASSAY OF IRON: CPT

## 2022-12-23 PROCEDURE — 36415 COLL VENOUS BLD VENIPUNCTURE: CPT

## 2022-12-23 PROCEDURE — 85025 COMPLETE CBC W/AUTO DIFF WBC: CPT

## 2022-12-23 PROCEDURE — 99213 OFFICE O/P EST LOW 20 MIN: CPT | Mod: GC

## 2022-12-23 PROCEDURE — 80053 COMPREHEN METABOLIC PANEL: CPT

## 2022-12-23 PROCEDURE — 83550 IRON BINDING TEST: CPT

## 2022-12-23 PROCEDURE — 82728 ASSAY OF FERRITIN: CPT

## 2022-12-23 RX ORDER — LAMOTRIGINE 150 MG/1
150 TABLET ORAL 2 TIMES DAILY
Qty: 60 TABLET | Refills: 11 | Status: SHIPPED | OUTPATIENT
Start: 2022-12-23 | End: 2023-12-26

## 2022-12-23 RX ORDER — FOLIC ACID 1 MG/1
1 TABLET ORAL 2 TIMES DAILY
Qty: 60 TABLET | Refills: 11 | Status: SHIPPED | OUTPATIENT
Start: 2022-12-23

## 2022-12-23 NOTE — PROGRESS NOTES
Assessment & Plan:    1. Epilepsy  Medication refill   Stable without seizures on her current medication. Reviewed importance of Folic acid in her regiment. She is not planning to get pregnant in the near future but does not use birth control.   - Lamotrigine 150 mg BID refilled  - Folic Acid 1 mg BID refilled  - Recommended checking with Neurology when next follow up is recommended    Celine Austin is a 32 year old with hx of epilepsy, depression presenting for follow up and refill on Lamotrigine. Per chart review, She last saw Neurology in April 2022 and was weaned off Keppra and back to Lamotrigine as her sole antiepileptic medication. She has stopped the folic acid. No birth control, not sexually active. She has not had any seizures since 2019, at that time she reports she stopped taking medication and then she had a seizure. She is consistent with taking her medication daily and very rarely forgets. She is not experiencing any side effects. Denies fever, chills, cp, sob, abdominal pain, rashes.   She is no longer taking Hydroxyzine or Metoprolol.     Recheck Medication (refill)    Review of Systems   Constitutional, HEENT, cardiovascular, pulmonary, gi and gu systems are negative, except as otherwise noted.      Objective    BP 90/57   Pulse 77   Temp 97.5  F (36.4  C)   Resp 16   Wt 46 kg (101 lb 6.4 oz)   SpO2 96%   BMI 19.16 kg/m    Body mass index is 19.16 kg/m .  Physical Exam   GENERAL: healthy, alert and no distress  NECK: no adenopathy, no asymmetry, masses, or scars and thyroid normal to palpation  RESP: lungs clear to auscultation  CV: regular rate and rhythm, normal S1 S2  MS: no gross musculoskeletal defects noted, no edema  NEURO: Normal strength and tone, mentation intact and speech normal    I precepted today with Dr Moralez.    Nereida Calles MD PGY-2

## 2022-12-23 NOTE — PATIENT INSTRUCTIONS
Thank you for taking the time to discuss your health with me today!    Today we discussed:  I have refilled your lamotrigine medication.  I have also refilled your folic acid please resume taking this when you take your lamotrigine.  I recommend scheduling follow-up with neurology in the spring or at least reaching out to their office to see when you are due to follow-up.  I will call you with an  if any of the lab results are abnormal and we need to discuss them.    As always, please call the clinic or message with any questions or concerns.     Best Wishes,  Nereida Calles MD.

## 2023-06-03 ENCOUNTER — HEALTH MAINTENANCE LETTER (OUTPATIENT)
Age: 33
End: 2023-06-03

## 2023-12-26 DIAGNOSIS — G40.802 FRONTAL LOBE EPILEPSY (H): ICD-10-CM

## 2023-12-26 RX ORDER — LAMOTRIGINE 150 MG/1
150 TABLET ORAL 2 TIMES DAILY
Qty: 60 TABLET | Refills: 0 | Status: SHIPPED | OUTPATIENT
Start: 2023-12-26 | End: 2023-12-27

## 2023-12-26 NOTE — TELEPHONE ENCOUNTER
Routing refill request to provider for review/approval because:   Recent (12 mo) or future (30 days) visit within the authorizing provider's specialty    Review Authorizing provider's last note.    Normal CBC on file in past 26 months         Medication requested: LAMOTRIGINE 150 MG TABS 150 Tablet   Last office visit: 12/23/2022  Future [facility] appointments: TODAY  Medication last refilled: 11/25/2023   Last qualifying labs:   Component      Latest Ref Rng 12/23/2022  8:57 AM   Hemoglobin      11.7 - 15.7 g/dL 11.1 (L)    Hematocrit      35.0 - 47.0 % 35.7    MCV      78 - 100 fL 63 (L)    MCH      26.5 - 33.0 pg 19.6 (L)    MCHC      31.5 - 36.5 g/dL 31.1 (L)    WBC      4.0 - 11.0 10e3/uL 10.0    RDW      10.0 - 15.0 % 15.9 (H)    % Neutrophils      % 77    % Lymphocytes      % 16    % Monocytes      % 4    % Eosinophils      % 2    % Basophils      % 1    RBC Count      3.80 - 5.20 10e6/uL 5.66 (H)    Platelet Count      150 - 450 10e3/uL 216    % Immature Granulocytes      % 0    NRBCs per 100 WBC      <1 /100 0    Absolute Neutrophils      1.6 - 8.3 10e3/uL 7.7    Absolute Lymphocytes      0.8 - 5.3 10e3/uL 1.6    Absolute Monocytes      0.0 - 1.3 10e3/uL 0.4    Absolute Eosinophils      0.0 - 0.7 10e3/uL 0.2    Absolute Basophils      0.0 - 0.2 10e3/uL 0.1    Absolute Immature Granulocytes      <=0.4 10e3/uL 0.0    Absolute NRBCs      10e3/uL 0.0       Legend:  (L) Low  (H) High    Routed to provider due to failed RN protocol.     TINA Rowland

## 2023-12-27 ENCOUNTER — OFFICE VISIT (OUTPATIENT)
Dept: FAMILY MEDICINE | Facility: CLINIC | Age: 33
End: 2023-12-27
Payer: COMMERCIAL

## 2023-12-27 VITALS
RESPIRATION RATE: 18 BRPM | SYSTOLIC BLOOD PRESSURE: 98 MMHG | DIASTOLIC BLOOD PRESSURE: 63 MMHG | HEART RATE: 81 BPM | OXYGEN SATURATION: 100 % | WEIGHT: 105.4 LBS | BODY MASS INDEX: 19.92 KG/M2 | TEMPERATURE: 98.1 F

## 2023-12-27 DIAGNOSIS — G40.802 FRONTAL LOBE EPILEPSY (H): Primary | ICD-10-CM

## 2023-12-27 LAB
ALBUMIN SERPL BCG-MCNC: 4.7 G/DL (ref 3.5–5.2)
ALP SERPL-CCNC: 69 U/L (ref 40–150)
ALT SERPL W P-5'-P-CCNC: 17 U/L (ref 0–50)
ANION GAP SERPL CALCULATED.3IONS-SCNC: 10 MMOL/L (ref 7–15)
AST SERPL W P-5'-P-CCNC: 23 U/L (ref 0–45)
BILIRUB SERPL-MCNC: 0.9 MG/DL
BUN SERPL-MCNC: 11.4 MG/DL (ref 6–20)
CALCIUM SERPL-MCNC: 9.5 MG/DL (ref 8.6–10)
CHLORIDE SERPL-SCNC: 103 MMOL/L (ref 98–107)
CREAT SERPL-MCNC: 0.74 MG/DL (ref 0.51–0.95)
DEPRECATED HCO3 PLAS-SCNC: 25 MMOL/L (ref 22–29)
EGFRCR SERPLBLD CKD-EPI 2021: >90 ML/MIN/1.73M2
ERYTHROCYTE [DISTWIDTH] IN BLOOD BY AUTOMATED COUNT: 17.1 % (ref 10–15)
GLUCOSE SERPL-MCNC: 93 MG/DL (ref 70–99)
HCT VFR BLD AUTO: 33.4 % (ref 35–47)
HGB BLD-MCNC: 10.7 G/DL (ref 11.7–15.7)
MCH RBC QN AUTO: 19.6 PG (ref 26.5–33)
MCHC RBC AUTO-ENTMCNC: 32 G/DL (ref 31.5–36.5)
MCV RBC AUTO: 61 FL (ref 78–100)
PLATELET # BLD AUTO: 198 10E3/UL (ref 150–450)
POTASSIUM SERPL-SCNC: 3.8 MMOL/L (ref 3.4–5.3)
PROT SERPL-MCNC: 8.1 G/DL (ref 6.4–8.3)
RBC # BLD AUTO: 5.47 10E6/UL (ref 3.8–5.2)
SODIUM SERPL-SCNC: 138 MMOL/L (ref 135–145)
WBC # BLD AUTO: 5.8 10E3/UL (ref 4–11)

## 2023-12-27 PROCEDURE — 99000 SPECIMEN HANDLING OFFICE-LAB: CPT

## 2023-12-27 PROCEDURE — 85027 COMPLETE CBC AUTOMATED: CPT

## 2023-12-27 PROCEDURE — 36415 COLL VENOUS BLD VENIPUNCTURE: CPT

## 2023-12-27 PROCEDURE — 99214 OFFICE O/P EST MOD 30 MIN: CPT | Mod: GC

## 2023-12-27 PROCEDURE — 80175 DRUG SCREEN QUAN LAMOTRIGINE: CPT | Mod: 90

## 2023-12-27 PROCEDURE — 80053 COMPREHEN METABOLIC PANEL: CPT

## 2023-12-27 RX ORDER — LAMOTRIGINE 150 MG/1
150 TABLET ORAL 2 TIMES DAILY
Qty: 60 TABLET | Refills: 11 | Status: SHIPPED | OUTPATIENT
Start: 2023-12-27

## 2023-12-27 NOTE — PROGRESS NOTES
Preceptor Attestation:    I discussed the patient with the resident and evaluated the patient in person. I have verified the content of the note, which accurately reflects my assessment of the patient and the plan of care.   Supervising Physician:  Silver Rizzo DO.

## 2023-12-27 NOTE — PROGRESS NOTES
Assessment & Plan     Frontal lobe epilepsy (H)  On Lamotrigine for a few years. She is declining taking folic acid and is not on contraception. We discussed the risks of this medication if she were to become pregnant and the reduced risk with folic acid, however, she declines taking folic acid at this time. I have placed a Neurology referral as she is no established with anyone and has no recent follow up.   - Lamotrigine Level  - CBC with platelets  - Comprehensive metabolic panel  - lamoTRIgine (LAMICTAL) 150 MG tablet  Dispense: 60 tablet; Refill: 11  - Adult Neurology  Referral        No follow-ups on file.    Nereida Calles MD  [facility] LITO Austin is a 33 year old, presenting for the following health issues:  Medication Refill (Lamotrigine)        12/27/2023     8:15 AM   Additional Questions   Roomed by Dahiana PETERSON     Here for refill on Lamotrigine medication. She has not had any seizure activity within the last year but thinks she had one about a year ago. She feels her seizures are controlled when she takes the medication consistently. When she has a seizure, she passes out and has full body shaking.  She reports she is not taking birth control and is not taking folic acid. She is  and sexually active. She does not believe she is experiencing any side effects from her medication. She does not miss doses regularly.         Review of Systems   Constitutional, HEENT, cardiovascular, pulmonary, gi and gu systems are negative, except as otherwise noted.      Objective    BP 98/63   Pulse 81   Temp 98.1  F (36.7  C)   Resp 18   Wt 47.8 kg (105 lb 6.4 oz)   LMP 12/10/2023   SpO2 100%   BMI 19.92 kg/m    Body mass index is 19.92 kg/m .  Physical Exam   GENERAL: healthy, alert and no distress  RESP: lungs clear to auscultation - no rales, rhonchi or wheezes  CV: regular rate and rhythm, normal S1 S2, no S3 or S4, no murmur, click or rub, no peripheral  edema and peripheral pulses strong  ABDOMEN: soft, nontender, no hepatosplenomegaly, no masses and bowel sounds normal  MS: no gross musculoskeletal defects noted, no edema  NEURO: Normal strength and tone, mentation intact and speech normal    I precepted today with Dr Rizzo.    Hafsa Calles MD PGY-3

## 2023-12-27 NOTE — LETTER
December 29, 2023      Norman Watts  [address] [place] [address]        Dear ,    We are writing to inform you of your test results.    Your labs were all within normal and stable from last year. Please follow up with Neurology as we discussed.     Resulted Orders   Comprehensive metabolic panel   Result Value Ref Range    Sodium 138 135 - 145 mmol/L      Comment:      Reference intervals for this test were updated on 09/26/2023 to more accurately reflect our healthy population. There may be differences in the flagging of prior results with similar values performed with this method. Interpretation of those prior results can be made in the context of the updated reference intervals.     Potassium 3.8 3.4 - 5.3 mmol/L    Carbon Dioxide (CO2) 25 22 - 29 mmol/L    Anion Gap 10 7 - 15 mmol/L    Urea Nitrogen 11.4 6.0 - 20.0 mg/dL    Creatinine 0.74 0.51 - 0.95 mg/dL    GFR Estimate >90 >60 mL/min/1.73m2    Calcium 9.5 8.6 - 10.0 mg/dL    Chloride 103 98 - 107 mmol/L    Glucose 93 70 - 99 mg/dL    Alkaline Phosphatase 69 40 - 150 U/L      Comment:      Reference intervals for this test were updated on 11/14/2023 to more accurately reflect our healthy population. There may be differences in the flagging of prior results with similar values performed with this method. Interpretation of those prior results can be made in the context of the updated reference intervals.    AST 23 0 - 45 U/L      Comment:      Reference intervals for this test were updated on 6/12/2023 to more accurately reflect our healthy population. There may be differences in the flagging of prior results with similar values performed with this method. Interpretation of those prior results can be made in the context of the updated reference intervals.    ALT 17 0 - 50 U/L      Comment:      Reference intervals for this test were updated on 6/12/2023 to more accurately reflect our healthy population. There may be differences in the flagging of  prior results with similar values performed with this method. Interpretation of those prior results can be made in the context of the updated reference intervals.      Protein Total 8.1 6.4 - 8.3 g/dL    Albumin 4.7 3.5 - 5.2 g/dL    Bilirubin Total 0.9 <=1.2 mg/dL   CBC with platelets   Result Value Ref Range    WBC Count 5.8 4.0 - 11.0 10e3/uL    RBC Count 5.47 (H) 3.80 - 5.20 10e6/uL    Hemoglobin 10.7 (L) 11.7 - 15.7 g/dL    Hematocrit 33.4 (L) 35.0 - 47.0 %    MCV 61 (L) 78 - 100 fL    MCH 19.6 (L) 26.5 - 33.0 pg    MCHC 32.0 31.5 - 36.5 g/dL    RDW 17.1 (H) 10.0 - 15.0 %    Platelet Count 198 150 - 450 10e3/uL   Lamotrigine Level   Result Value Ref Range    Lamotrigine 11.3 3.0 - 15.0 ug/mL      Comment:      INTERPRETIVE INFORMATION:  Lamotrigine    Therapeutic Range:  3.0-15.0 ug/mL              Toxic:  Greater than or equal to 20 ug/mL     Pharmacokinetics varies widely, particularly with   co-medications and/or compromised renal function.  Adverse   effects may include dizziness, somnolence, nausea and   vomiting.  Performed By: [facility]  [address] [place], [place] [id number]  : Ned Yin MD, PhD  CLIA Number: [id number]       If you have any questions or concerns, please call the clinic at the number listed above.       Sincerely,      Nereida Calles MD

## 2023-12-27 NOTE — PATIENT INSTRUCTIONS
Thank you for taking the time to discuss your health with me today!    Today we discussed:  I have refilled your medication as requested. I strongly recommend taking folic acid supplementation to prevent birth defects in case you would become pregnant, please let me know if you change your mind. We will check some lab work and I will call you with abnormal results.   2. I have placed a Neurology referral to follow up and see if they recommend any changes to your management.     As always, please call the clinic or message with any questions or concerns.     Best Wishes,  Nereida Calles MD.

## 2023-12-28 LAB — LAMOTRIGINE SERPL-MCNC: 11.3 UG/ML

## 2024-07-13 ENCOUNTER — HEALTH MAINTENANCE LETTER (OUTPATIENT)
Age: 34
End: 2024-07-13

## 2025-01-27 ENCOUNTER — TRANSCRIBE ORDERS (OUTPATIENT)
Dept: FAMILY MEDICINE | Facility: CLINIC | Age: 35
End: 2025-01-27

## 2025-01-27 DIAGNOSIS — G40.802 FRONTAL LOBE EPILEPSY (H): Primary | ICD-10-CM

## 2025-03-31 ENCOUNTER — LAB REQUISITION (OUTPATIENT)
Dept: LAB | Facility: CLINIC | Age: 35
End: 2025-03-31

## 2025-03-31 DIAGNOSIS — Z12.4 ENCOUNTER FOR SCREENING FOR MALIGNANT NEOPLASM OF CERVIX: ICD-10-CM

## 2025-03-31 DIAGNOSIS — Z13.6 ENCOUNTER FOR SCREENING FOR CARDIOVASCULAR DISORDERS: ICD-10-CM

## 2025-03-31 DIAGNOSIS — G40.802 OTHER EPILEPSY, NOT INTRACTABLE, WITHOUT STATUS EPILEPTICUS (H): ICD-10-CM

## 2025-03-31 LAB
ALBUMIN SERPL BCG-MCNC: 4.6 G/DL (ref 3.5–5.2)
ALP SERPL-CCNC: 69 U/L (ref 40–150)
ALT SERPL W P-5'-P-CCNC: 19 U/L (ref 0–50)
ANION GAP SERPL CALCULATED.3IONS-SCNC: 11 MMOL/L (ref 7–15)
AST SERPL W P-5'-P-CCNC: 21 U/L (ref 0–45)
BILIRUB SERPL-MCNC: 0.7 MG/DL
BUN SERPL-MCNC: 9.5 MG/DL (ref 6–20)
CALCIUM SERPL-MCNC: 9.1 MG/DL (ref 8.8–10.4)
CHLORIDE SERPL-SCNC: 104 MMOL/L (ref 98–107)
CHOLEST SERPL-MCNC: 154 MG/DL
CREAT SERPL-MCNC: 0.76 MG/DL (ref 0.51–0.95)
EGFRCR SERPLBLD CKD-EPI 2021: >90 ML/MIN/1.73M2
FASTING STATUS PATIENT QL REPORTED: NO
FASTING STATUS PATIENT QL REPORTED: NO
GLUCOSE SERPL-MCNC: 92 MG/DL (ref 70–99)
HCO3 SERPL-SCNC: 24 MMOL/L (ref 22–29)
HDLC SERPL-MCNC: 72 MG/DL
LDLC SERPL CALC-MCNC: 72 MG/DL
NONHDLC SERPL-MCNC: 82 MG/DL
POTASSIUM SERPL-SCNC: 3.6 MMOL/L (ref 3.4–5.3)
PROT SERPL-MCNC: 7.7 G/DL (ref 6.4–8.3)
SODIUM SERPL-SCNC: 139 MMOL/L (ref 135–145)
TRIGL SERPL-MCNC: 52 MG/DL

## 2025-03-31 PROCEDURE — 82435 ASSAY OF BLOOD CHLORIDE: CPT | Performed by: FAMILY MEDICINE

## 2025-03-31 PROCEDURE — G0145 SCR C/V CYTO,THINLAYER,RESCR: HCPCS | Mod: ORL | Performed by: FAMILY MEDICINE

## 2025-03-31 PROCEDURE — 84478 ASSAY OF TRIGLYCERIDES: CPT | Performed by: FAMILY MEDICINE

## 2025-03-31 PROCEDURE — 82310 ASSAY OF CALCIUM: CPT | Performed by: FAMILY MEDICINE

## 2025-03-31 PROCEDURE — 82465 ASSAY BLD/SERUM CHOLESTEROL: CPT | Performed by: FAMILY MEDICINE

## 2025-03-31 PROCEDURE — 87624 HPV HI-RISK TYP POOLED RSLT: CPT | Mod: ORL | Performed by: FAMILY MEDICINE

## 2025-03-31 PROCEDURE — 87624 HPV HI-RISK TYP POOLED RSLT: CPT | Performed by: FAMILY MEDICINE

## 2025-03-31 PROCEDURE — G0145 SCR C/V CYTO,THINLAYER,RESCR: HCPCS | Performed by: FAMILY MEDICINE

## 2025-04-03 LAB
BKR AP ASSOCIATED HPV REPORT: NORMAL
BKR LAB AP GYN ADEQUACY: NORMAL
BKR LAB AP GYN INTERPRETATION: NORMAL
BKR LAB AP LMP: NORMAL
BKR LAB AP PREVIOUS ABNL DX: NORMAL
BKR LAB AP PREVIOUS ABNORMAL: NORMAL
HPV HR 12 DNA CVX QL NAA+PROBE: NEGATIVE
HPV16 DNA CVX QL NAA+PROBE: NEGATIVE
HPV18 DNA CVX QL NAA+PROBE: NEGATIVE
HUMAN PAPILLOMA VIRUS FINAL DIAGNOSIS: NORMAL
PATH REPORT.COMMENTS IMP SPEC: NORMAL
PATH REPORT.COMMENTS IMP SPEC: NORMAL
PATH REPORT.RELEVANT HX SPEC: NORMAL

## 2025-07-19 ENCOUNTER — HEALTH MAINTENANCE LETTER (OUTPATIENT)
Age: 35
End: 2025-07-19